# Patient Record
Sex: MALE | Race: WHITE | Employment: FULL TIME | URBAN - METROPOLITAN AREA
[De-identification: names, ages, dates, MRNs, and addresses within clinical notes are randomized per-mention and may not be internally consistent; named-entity substitution may affect disease eponyms.]

---

## 2017-01-29 RX ORDER — FUROSEMIDE 40 MG/1
TABLET ORAL
Qty: 90 TAB | Refills: 1 | Status: SHIPPED | OUTPATIENT
Start: 2017-01-29 | End: 2017-07-29 | Stop reason: SDUPTHER

## 2017-03-23 DIAGNOSIS — E55.9 HYPOVITAMINOSIS D: ICD-10-CM

## 2017-03-23 RX ORDER — ERGOCALCIFEROL 1.25 MG/1
CAPSULE ORAL
Qty: 26 CAP | Refills: 0 | Status: SHIPPED | OUTPATIENT
Start: 2017-03-23 | End: 2018-09-19

## 2017-03-27 DIAGNOSIS — E11.9 DIABETES MELLITUS TYPE 2, CONTROLLED (HCC): ICD-10-CM

## 2017-03-27 NOTE — PROGRESS NOTES
58 y.o. WHITE OR  male who presents for discussion. He seems to be doing ok. Denies polyuria, polydipsia, nocturia, vision change. Not checking sugars at this time. Weight is stable as below    Vitals 3/29/2017 9/27/2016 5/26/2016 5/3/2016 3/29/2016 9/30/2015   Weight 264 lb 272 lb 274 lb 274 lb 281 lb 270 lb     Denies any cv complaints. No set exercise regimen    No gi or gu issues    He thinks he had a bug bite in the left ankle but it cleared up quickly and the swelling went down. Edema otherwise at baseline on his regimen below    Past Medical History:   Diagnosis Date    Anxiety     Arthritis     between C4 and C5    Basal cell carcinoma     s/p resection    Carotid duplex 02/04/2013    No stenosis >49% bilaterally.  Carpal tunnel syndrome     Diabetes ([facility] [address] 05/2016    fbs>125 x2    Dyslipidemia     Fatty liver on Us 1/02     Fib-4 was 0.43 from 9/14    GERD (gastroesophageal reflux disease)     History of myocardial perfusion scan 01/21/2013    Mild fixed basal inferior, mid inferior, inferoapical defect likely artifact, but inrafction in RCA not entirely excluded. No ischemia. EF 53%. No WMA. Neg EKG on max EST. Ex time 8 min 15 sec.  Hypertension     Hypovitaminosis D     Low serum testosterone level 2012    negative w/u Dr. Aure Shore Morbid obesity ([facility] [address]     peak weight 286 lbs, bmi 38.8 from 7/11; intol qsymia, wellbutrin    Normal stress echo 03/31/2015    Normal study after maximal exercise. No symptoms. EF 60%. Ex time 9 min.  Prediabetes     Rhinophyma     S/P cardiac cath 01/22/2002    Patent coronary arteries. LVEDP 12. EF 60%.  Sinus bradycardia     Asymptomatic in 2011    Sleep apnea mild Dr. Aileen Fitzpatrick 2011     AHI 2.3, minimum desats 83% 2011 Dr Aliza Styles VSD (ventricular septal defect) 2011    Noted on echo, with flow noted between the aorta near the aortic valve and the RV outflow tract/proximal main PA.  This can be consistent with a small outlet VSD    Zoster     3 prior episodes in right CN5 distribution? suspect hsv instead     Past Surgical History:   Procedure Laterality Date    HX COLONOSCOPY      Dr. Radames Grider 2007 negative    HX HEART CATHETERIZATION  1/22/02    The right coronary artery is large and dominant. It is widely patent. The left main coronary artery is widely patent. The left anterior descending coronary artery is widely patent. The circumflex coronary artery is widely patent. LVEDP is 12 mmHg. Overall left ventricular systolic function is within normal limits with an EF of 60%. No significant MR or AS is noted.     HX HEENT  1970    surgery for deviated septum    HX ORTHOPAEDIC Right 1976    knee cartilage removed right knee    HX ORTHOPAEDIC Left 2012    CTR    HX ORTHOPAEDIC      Bilateral hip replacement Dr Corinne Pickard Right     trigger release 4th finger hand    HX VASECTOMY      VASCULAR SURGERY PROCEDURE UNLIST  2/13    renal artery dopplers neg    VASECTOMY       Social History     Social History    Marital status:      Spouse name: N/A    Number of children: 1    Years of education: N/A     Occupational History    developer      Social History Main Topics    Smoking status: Former Smoker     Packs/day: 1.00     Years: 10.00     Quit date: 11/26/1987    Smokeless tobacco: Never Used    Alcohol use 1.0 oz/week     2 Standard drinks or equivalent per week      Comment: social weekly    Drug use: No    Sexual activity: Yes     Other Topics Concern    Not on file     Social History Narrative     Current Outpatient Prescriptions   Medication Sig    VITAMIN D2 50,000 unit capsule TAKE 1 CAPSULE BY MOUTH ON TUESDAY AND THURSDAY    furosemide (LASIX) 40 mg tablet TAKE ONE TABLET BY MOUTH DAILY    benazepril (LOTENSIN) 40 mg tablet TAKE ONE TABLET BY MOUTH DAILY    valACYclovir (VALTREX) 1 gram tablet TAKE ONE TABLET BY MOUTH THREE TIMES A DAY    atorvastatin (LIPITOR) 40 mg tablet TAKE ONE TABLET BY MOUTH DAILY    amLODIPine (NORVASC) 10 mg tablet TAKE ONE TABLET BY MOUTH DAILY    metFORMIN ER (GLUCOPHAGE XR) 500 mg tablet Take 1 Tab by mouth daily (with dinner).  metolazone (ZAROXOLYN) 2.5 mg tablet Take 2.5 mg by mouth daily as needed.  PARoxetine (PAXIL) 20 mg tablet TAKE ONE TABLET BY MOUTH ONCE DAILY    Dexlansoprazole (DEXILANT) 60 mg CpDM Take  by mouth daily.  metoprolol (LOPRESSOR) 25 mg tablet Take 1 Tab by mouth two (2) times a day. No current facility-administered medications for this visit. Allergies   Allergen Reactions    Qsymia [Phentermine-Topiramate] Other (comments)     Dulled his thinking    Wellbutrin [Bupropion Hcl] Other (comments)     Patient said it made him feel funny     REVIEW OF SYSTEMS: colo 2007 Dr Eber Emerson  Ophtho  no vision change or eye pain  Oral  no mouth pain, tongue or tooth problems  Ears  no hearing loss, ear pain, fullness  Throat  no swallowing problems  Chest  no breast masses  Resp  no wheezing, chronic coughing, dyspnea  GI  no heartburn, nausea, vomiting, change in bowel habits, bleeding, hemorrhoids  Urinary  no dysuria, hematuria, flank pain, urgency, frequency  Constitutional  no wt loss, night sweats, unexplained fevers  Neuro  no focal weakness, numbness, paresthesias, incoordination, ataxia, involuntary movements    Visit Vitals    /84    Pulse 72    Temp 99.5 °F (37.5 °C) (Oral)    Ht 6' (1.829 m)    Wt 264 lb (119.7 kg)    SpO2 97%    BMI 35.8 kg/m2     A&O x3  Affect is appropriate. Mood stable  No apparent distress  Anicteric, no JVD, adenopathy or thyromegaly. No carotid bruits or radiated murmur  Lungs clear to auscultation, no wheezes or rales  Heart showed regular rate and rhythm. No murmur, rubs, gallops  Abdomen soft nontender, no hepatosplenomegaly or masses. Extremities with trace ankle edema.   Pulses 1-2+ symmetrically  Foot exam bilaterally showed  Vibration, proprioception, filament test intact  Pulses 1-2+ DP and PT    LABS  From 1/12 showed gluc 123, cr 0.80, gfr>60, alt 31, ldl-p 1701,                                   vit d 13.5  From 4/12 showed gluc 120, cr 0.70, gfr>60, alt 34, ldl-p 1628,                                   test 268,        vit d 24.1,        psa 0.83  From 8/12 showed gluc 114, cr 0.80, gfr>60,  alt 37, ldl-p 1203, chol 148, tg 112, hd 51, ldl-c 86,  hba1c 5.4, test 439  From1/13  showed gluc 135, cr 0.90,              alt 25, ldl-p 951,   chol 110, tg 75,    hdl 36, ldl-c 59,                   test 379  From 6/14 showed gluc 85,   cr 0.80, gfr>60,  alt 19,         chol 142, tg 132, hdl 44, ldl-c 72, hba1c 5.2, wbc 7.8,  hb 14.7, plt 288, ua neg,  umar 4.9, psa 1.00  From 9/14 showed gluc 96,   cr 0.89, gfr>60,  alt 27,                  hba1c 5.9, wbc 8.2,   hb 14.6, plt 289, ua neg,          inr 0.9, ptt 34.0  From 5/15 showed gluc 104, cr 0.70, gfr 103, alt 12,         chol 162, tg 83,   hdl 53, ldl-c 93,        wbc 19.2, hb 15.3, plt 356, tsh 0.75, vit d 48.8  From 6/15 showed                   wbc 4.4,   hb 14.5, plt 223  From 9/15 showed gluc 111, cr 0.80, gfr>60,  alt 18,         chol 120, tg 134, hdl 34, ldl-c 61, hba1c 5.7, wbc 6.7,   hb 14.9, plt 256  From 5/16 showed gluc 128, cr 0.80, gfr>60,  alt 21,         chol 124, tg 128, hdl 35, ldl-c 63, hba1c 6.4, wbc 6.6,   hb 13.8, plt 251  From 9/16 showed gluc 116, cr 0.90, gfr>60,          chol 113, tg 86,   hdl 36, ldl-c 60, hba1c 6.0, wbc 7.0,   hb 14.5, plt 258, psa 0.65    Patient Active Problem List   Diagnosis Code    Chronic LE edema R60.0    Hypovitaminosis D Dr. Amrita Johnson E55.9    Dyslipidemia E78.5    Sleep apnea Dr. Aileen Fitzpatrick 2011 AHI 2.8 G47.30    Osteoarthritis, hip, bilateral M16.0    Gastroesophageal reflux disease without esophagitis Dr Cody Burgess 2002 K21.9    Obesity (BMI 30-39. 9) E66.9    Controlled type 2 diabetes mellitus E11.9    Primary hypertension I10     Assessment and plan:  1. Hypertension. Continue current  2. Dyslipidemia. At target  3. Chronic edema. Continue diuretics  4. VSD vs AI. F/U Dr. Magi Resendiz  5. GERD. Continue current and f/u Dr. Brenda Bush  6. Hypovitaminosis D. Continue supplementation, check next draw  7. Morbid obesity. Declined med supervised wt loss, bariatrics  8. DM. Check labs at his conveninece, f/u [facility]  9. ED. Prn viagra  10.  zostavax given. PVX in future  11. Will sched colo Dr Brenda Bush      RTC 1/17    Above conditions discussed at length and patient vocalized understanding.   All questions answered to patient satifaction

## 2017-03-28 DIAGNOSIS — E11.9 DIABETES MELLITUS TYPE 2, CONTROLLED (HCC): ICD-10-CM

## 2017-03-29 ENCOUNTER — OFFICE VISIT (OUTPATIENT)
Dept: INTERNAL MEDICINE CLINIC | Age: 62
End: 2017-03-29

## 2017-03-29 VITALS
DIASTOLIC BLOOD PRESSURE: 84 MMHG | WEIGHT: 264 LBS | OXYGEN SATURATION: 97 % | TEMPERATURE: 99.5 F | BODY MASS INDEX: 35.76 KG/M2 | HEART RATE: 72 BPM | SYSTOLIC BLOOD PRESSURE: 124 MMHG | HEIGHT: 72 IN

## 2017-03-29 DIAGNOSIS — E55.9 HYPOVITAMINOSIS D: ICD-10-CM

## 2017-03-29 DIAGNOSIS — R60.0 EDEMA EXTREMITIES: ICD-10-CM

## 2017-03-29 DIAGNOSIS — E11.9 CONTROLLED TYPE 2 DIABETES MELLITUS WITHOUT COMPLICATION, WITHOUT LONG-TERM CURRENT USE OF INSULIN (HCC): ICD-10-CM

## 2017-03-29 DIAGNOSIS — E66.9 OBESITY (BMI 30-39.9): ICD-10-CM

## 2017-03-29 DIAGNOSIS — K21.9 GASTROESOPHAGEAL REFLUX DISEASE WITHOUT ESOPHAGITIS: ICD-10-CM

## 2017-03-29 DIAGNOSIS — E11.9 DIABETES MELLITUS TYPE 2, CONTROLLED (HCC): ICD-10-CM

## 2017-03-29 DIAGNOSIS — G47.33 OBSTRUCTIVE SLEEP APNEA SYNDROME: ICD-10-CM

## 2017-03-29 DIAGNOSIS — Z12.11 SCREENING FOR COLON CANCER: ICD-10-CM

## 2017-03-29 DIAGNOSIS — E78.5 DYSLIPIDEMIA: ICD-10-CM

## 2017-03-29 DIAGNOSIS — Z23 ENCOUNTER FOR IMMUNIZATION: Primary | ICD-10-CM

## 2017-03-29 DIAGNOSIS — I10 PRIMARY HYPERTENSION: ICD-10-CM

## 2017-03-29 NOTE — MR AVS SNAPSHOT
Visit Information Date & Time Provider Department Dept. Phone Encounter #  
 3/29/2017  9:15 AM Thais Driver MD [facility] [phone] [id number] Your Appointments 5/2/2017  3:00 PM  
Follow Up with Lexy Almaguer MD  
[facility] ([address]) Appt Note: 1 year follow up [place] [id number]  
[phone] [address] [place] [address] [address] Upcoming Health Maintenance Date Due Hepatitis C Screening 1955 FOOT EXAM Q1 2/13/1965 EYE EXAM RETINAL OR DILATED Q1 2/13/1965 Pneumococcal 19-64 Medium Risk (1 of 1 - PPSV23) 2/13/1974 ZOSTER VACCINE AGE 60> 2/13/2015 MICROALBUMIN Q1 6/17/2015 HEMOGLOBIN A1C Q6M 3/19/2017 LIPID PANEL Q1 9/19/2017 COLONOSCOPY 9/27/2018 DTaP/Tdap/Td series (3 - Td) 5/20/2025 Allergies as of 3/29/2017  Review Complete On: 9/27/2016 By: Thais Driver MD  
  
 Severity Noted Reaction Type Reactions Qsymia [Phentermine-topiramate]  09/30/2015    Other (comments) Dulled his thinking Wellbutrin [Bupropion Hcl]   Intolerance Other (comments) Patient said it made him feel funny Current Immunizations  Reviewed on 9/22/2016 Name Date Influenza Vaccine 10/1/2012 Influenza Vaccine (Quad) PF 9/27/2016 Influenza Vaccine Whole 1/27/2011 Pneumococcal Conjugate (PCV-13) 5/20/2015 TD Vaccine 1/1/1995 Tdap 5/20/2015, 1/30/2013 Not reviewed this visit Vitals BP Pulse Temp Height(growth percentile) Weight(growth percentile) SpO2  
 124/84 72 99.5 °F (37.5 °C) (Oral) 6' (1.829 m) 264 lb (119.7 kg) 97% BMI Smoking Status 35.8 kg/m2 Former Smoker Vitals History BMI and BSA Data Body Mass Index Body Surface Area  
 35.8 kg/m 2 2.47 m 2 Preferred Pharmacy Pharm[facility] Name Phone [facility] [facility] [phone] Your Updated Medication List  
  
   
This list is accurate as of: 3/29/17 10:11 AM.  Always use your most recent med list. amLODIPine 10 mg tablet Commonly known as:  Lexy Pace TAKE ONE TABLET BY MOUTH DAILY  
  
 atorvastatin 40 mg tablet Commonly known as:  LIPITOR  
TAKE ONE TABLET BY MOUTH DAILY  
  
 benazepril 40 mg tablet Commonly known as:  LOTENSIN  
TAKE ONE TABLET BY MOUTH DAILY DEXILANT 60 mg Cpdb Generic drug:  Dexlansoprazole Take  by mouth daily. furosemide 40 mg tablet Commonly known as:  LASIX TAKE ONE TABLET BY MOUTH DAILY  
  
 metFORMIN  mg tablet Commonly known as:  GLUCOPHAGE XR Take 1 Tab by mouth daily (with dinner). metOLazone 2.5 mg tablet Commonly known as:  Mackey Valle Take 2.5 mg by mouth daily as needed. metoprolol tartrate 25 mg tablet Commonly known as:  LOPRESSOR Take 1 Tab by mouth two (2) times a day. PARoxetine 20 mg tablet Commonly known as:  PAXIL TAKE ONE TABLET BY MOUTH ONCE DAILY  
  
 valACYclovir 1 gram tablet Commonly known as:  VALTREX  
TAKE ONE TABLET BY MOUTH THREE TIMES A DAY  
  
 VITAMIN D2 50,000 unit capsule Generic drug:  ergocalciferol TAKE 1 CAPSULE BY MOUTH ON TUESDAY AND THURSDAY Introducing hospitals & HEALTH SERVICES! Dear Vj Roth: 
Thank you for requesting a "[employer] account. Our records indicate that you already have an active "[employer] account. You can access your account anytime at [email or web address] Did you know that you can access your hospital and ER discharge instructions at any time in "[employer]? You can also review all of your test results from your hospital stay or ER visit. Additional Information If you have questions, please visit the Frequently Asked Questions section of the [employer] website at [email or web address] Remember, [employer] is NOT to be used for urgent needs. For medical emergencies, dial 911. Now available from your iPhone and Android! Please provide this summary of care documentation to your next provider. Your primary care clinician is listed as Sreekanth Erickson. If you have any questions after today's visit, please call [phone].

## 2017-03-29 NOTE — PROGRESS NOTES
1. Have you been to the ER, urgent care clinic or hospitalized since your last visit? NO.     2. Have you seen or consulted any other health care providers outside of the [address] [place] [address] since your last visit (Include any pap smears or colon screening)? NO      Do you have an Advanced Directive? YES    Would you like information on Advanced Directives?  NO

## 2017-04-19 ENCOUNTER — HOSPITAL ENCOUNTER (OUTPATIENT)
Dept: LAB | Age: 62
Discharge: HOME | End: 2017-04-19

## 2017-04-19 LAB — SENTARA SPECIMEN COL,SENBCF: NORMAL

## 2017-04-19 PROCEDURE — 99001 SPECIMEN HANDLING PT-LAB: CPT | Performed by: INTERNAL MEDICINE

## 2017-05-03 DIAGNOSIS — E78.5 DYSLIPIDEMIA: ICD-10-CM

## 2017-05-03 RX ORDER — ATORVASTATIN CALCIUM 40 MG/1
TABLET, FILM COATED ORAL
Qty: 30 TAB | Refills: 0 | Status: SHIPPED | OUTPATIENT
Start: 2017-05-03 | End: 2017-06-16 | Stop reason: SDUPTHER

## 2017-05-08 RX ORDER — ERGOCALCIFEROL 1.25 MG/1
CAPSULE ORAL
Qty: 26 CAP | Refills: 0 | Status: SHIPPED | OUTPATIENT
Start: 2017-05-08 | End: 2017-08-15 | Stop reason: SDUPTHER

## 2017-05-22 ENCOUNTER — TELEPHONE (OUTPATIENT)
Dept: INTERNAL MEDICINE CLINIC | Age: 62
End: 2017-05-22

## 2017-05-22 NOTE — TELEPHONE ENCOUNTER
Has he tried protonix (pantoprazole), nexium (esomeprazole), prevacid (lansoprazole)  Any of the 3 will be cheaper  Alternatively, we can try [address] also

## 2017-05-23 NOTE — TELEPHONE ENCOUNTER
I sent a prior auth to [employer] to see if that's what it needs, I didn't get a prior auth request from the pharmacy

## 2017-05-25 RX ORDER — PAROXETINE HYDROCHLORIDE 20 MG/1
TABLET, FILM COATED ORAL
Qty: 30 TAB | Refills: 10 | Status: SHIPPED | OUTPATIENT
Start: 2017-05-25 | End: 2018-06-03 | Stop reason: SDUPTHER

## 2017-05-29 DIAGNOSIS — E11.9 DIABETES MELLITUS TYPE 2, CONTROLLED (HCC): ICD-10-CM

## 2017-05-30 ENCOUNTER — TELEPHONE (OUTPATIENT)
Dept: INTERNAL MEDICINE CLINIC | Age: 62
End: 2017-05-30

## 2017-05-30 RX ORDER — METFORMIN HYDROCHLORIDE 500 MG/1
TABLET, EXTENDED RELEASE ORAL
Qty: 30 TAB | Refills: 10 | Status: SHIPPED | OUTPATIENT
Start: 2017-05-30 | End: 2018-06-03 | Stop reason: SDUPTHER

## 2017-05-30 NOTE — TELEPHONE ENCOUNTER
John Urena -PT REP-   is calling - they are looking for his lab results from April- please call- they are not resulted in My Chart

## 2017-05-30 NOTE — TELEPHONE ENCOUNTER
hba1c 5.8  Chol 132  tg 139,   hdl 32 - low -inc exercise  ldl 72  Vit d 43  umar negative    Labs ok except for hdl

## 2017-07-10 RX ORDER — AMLODIPINE BESYLATE 10 MG/1
TABLET ORAL
Qty: 90 TAB | Refills: 1 | Status: SHIPPED | OUTPATIENT
Start: 2017-07-10 | End: 2018-03-15 | Stop reason: SDUPTHER

## 2017-07-30 RX ORDER — FUROSEMIDE 40 MG/1
TABLET ORAL
Qty: 90 TAB | Refills: 0 | Status: SHIPPED | OUTPATIENT
Start: 2017-07-30 | End: 2017-10-26 | Stop reason: SDUPTHER

## 2017-08-15 RX ORDER — ERGOCALCIFEROL 1.25 MG/1
CAPSULE ORAL
Qty: 26 CAP | Refills: 0 | Status: SHIPPED | OUTPATIENT
Start: 2017-08-15 | End: 2018-01-16 | Stop reason: SDUPTHER

## 2017-08-22 PROBLEM — K57.30 DIVERTICULOSIS OF LARGE INTESTINE WITHOUT HEMORRHAGE: Status: ACTIVE | Noted: 2017-08-22

## 2017-09-26 ENCOUNTER — TELEPHONE (OUTPATIENT)
Dept: INTERNAL MEDICINE CLINIC | Age: 62
End: 2017-09-26

## 2017-10-26 RX ORDER — FUROSEMIDE 40 MG/1
TABLET ORAL
Qty: 90 TAB | Refills: 0 | Status: SHIPPED | OUTPATIENT
Start: 2017-10-26 | End: 2018-02-07 | Stop reason: SDUPTHER

## 2017-11-11 DIAGNOSIS — I10 PRIMARY HYPERTENSION: ICD-10-CM

## 2017-11-13 RX ORDER — BENAZEPRIL HYDROCHLORIDE 40 MG/1
TABLET ORAL
Qty: 30 TAB | Refills: 8 | Status: SHIPPED | OUTPATIENT
Start: 2017-11-13 | End: 2018-11-16 | Stop reason: SDUPTHER

## 2018-01-16 ENCOUNTER — OFFICE VISIT (OUTPATIENT)
Dept: CARDIOLOGY CLINIC | Age: 63
End: 2018-01-16

## 2018-01-16 VITALS
OXYGEN SATURATION: 98 % | BODY MASS INDEX: 37.79 KG/M2 | WEIGHT: 279 LBS | HEIGHT: 72 IN | SYSTOLIC BLOOD PRESSURE: 152 MMHG | DIASTOLIC BLOOD PRESSURE: 86 MMHG | HEART RATE: 72 BPM

## 2018-01-16 DIAGNOSIS — I10 PRIMARY HYPERTENSION: ICD-10-CM

## 2018-01-16 DIAGNOSIS — E78.5 DYSLIPIDEMIA: Primary | ICD-10-CM

## 2018-01-16 NOTE — PROGRESS NOTES
HISTORY OF PRESENT ILLNESS  Michelle De León is a 58 y.o. male. ASSESSMENT and PLAN    Mr. Jadyn Fried has history of possible VSD noted on an echocardiogram back in 2011. He has had 2 subsequent echocardiographic evaluations since then. Both of the follow-up echocardiogram did not show any obvious VSD; the eccentric jet is more likely consistent with eccentric aortic insufficiency and/or pulmonic insufficiency. There is no evidence of significant pulmonary hypertension or RV dysfunction. He has no significant CAD. He did undergo coronary angiography back in 2002 which did not show any significant epicardial occlusive disease. In March of 2015, he had an episode of malaise, increased dyspnea, chest pain, and palpitations. He was sitting at his desk when all these symptoms occurred. The stress echocardiography did not reveal any significant abnormalities.  CAD:   He has no documented history of CAD. He did have coronary angiography back in 2002 which was unremarkable.  VSD: He has no objective evidence of VSD as noted above.  BP:   Upper normal to mildly elevated. This is likely from his weight gain. I will defer further management of medications to you.  HR:    Stable.  CHF:   There is no evidence of decompensated CHF noted.  Weight:   His weight today is 279 pounds. His weight in May 2016 was 274 pounds. I discussed with him at length about the importance of weight control.  Cholesterol:   Target LDL <90. He remains on Lipitor 40 mg daily. I will see him back annually. Thank you. Encounter Diagnoses   Name Primary?     Dyslipidemia Yes    Primary hypertension      current treatment plan is effective, no change in therapy  lab results and schedule of future lab studies reviewed with patient  reviewed diet, exercise and weight control  cardiovascular risk and specific lipid/LDL goals reviewed      HPI  Today, Mr. Jerrell Velasquez has no complaints of chest pains, increased shortness of breath or decreased exercise capacity. He denies any orthopnea or PND. He denies any palpitations or dizziness. Because of the cold weather, and the recent holidays, he states that he put on some weight. Review of Systems   Respiratory: Negative for shortness of breath. Cardiovascular: Negative for chest pain, palpitations, orthopnea, claudication, leg swelling and PND. Musculoskeletal: Positive for joint pain. All other systems reviewed and are negative. Physical Exam   Constitutional: He is oriented to person, place, and time. He appears well-developed and well-nourished. HENT:   Head: Normocephalic. Eyes: Conjunctivae are normal.   Neck: Neck supple. No JVD present. Carotid bruit is not present. No thyromegaly present. Cardiovascular: Normal rate and regular rhythm. Pulmonary/Chest: Breath sounds normal.   Abdominal: Bowel sounds are normal.   Musculoskeletal: He exhibits no edema. Neurological: He is alert and oriented to person, place, and time. Skin: Skin is warm and dry. Nursing note and vitals reviewed. PCP: Flynn Jimenez MD    Past Medical History:   Diagnosis Date    Anxiety     Arthritis     between C4 and C5    Basal cell carcinoma     s/p resection    Carotid duplex 02/04/2013    No stenosis >49% bilaterally.  Carpal tunnel syndrome     Diabetes ([facility] [address] 05/2016    fbs>125 x2    Dyslipidemia     Fatty liver on Us 1/02     Fib-4 was 0.43 from 9/14    GERD (gastroesophageal reflux disease)     History of myocardial perfusion scan 01/21/2013    Mild fixed basal inferior, mid inferior, inferoapical defect likely artifact, but inrafction in RCA not entirely excluded. No ischemia. EF 53%. No WMA. Neg EKG on max EST. Ex time 8 min 15 sec.       Hypertension     Hypovitaminosis D     Low serum testosterone level 2012    negative w/u Dr. Alirio Lemons Morbid obesity ([facility] [address]     peak weight 286 lbs, bmi 38.8 from 7/11; intol qsymia, wellbutrin    Normal stress echo 03/31/2015    Normal study after maximal exercise. No symptoms. EF 60%. Ex time 9 min.  Prediabetes     Rhinophyma     S/P cardiac cath 01/22/2002    Patent coronary arteries. LVEDP 12. EF 60%.  Sinus bradycardia     Asymptomatic in 2011    Sleep apnea mild Dr. Sara De Santiago 2011     AHI 2.3, minimum desats 83% 2011 Dr Rula Jansen VSD (ventricular septal defect) 2011    Noted on echo, with flow noted between the aorta near the aortic valve and the RV outflow tract/proximal main PA. This can be consistent with a small outlet VSD    Zoster     3 prior episodes in right CN5 distribution? suspect hsv instead       Past Surgical History:   Procedure Laterality Date    HX COLONOSCOPY      Dr. Gianfranco Connor 2007 negative; 8/22/17 divertics    HX HEART CATHETERIZATION  1/22/02    The right coronary artery is large and dominant. It is widely patent. The left main coronary artery is widely patent. The left anterior descending coronary artery is widely patent. The circumflex coronary artery is widely patent. LVEDP is 12 mmHg. Overall left ventricular systolic function is within normal limits with an EF of 60%. No significant MR or AS is noted.    [address]    surgery for deviated septum    HX ORTHOPAEDIC Right 1976    knee cartilage removed right knee    HX ORTHOPAEDIC Left 2012    CTR    HX ORTHOPAEDIC      Bilateral hip replacement Dr Katelynn Germain Right     trigger release 4th finger hand    HX VASECTOMY      VASCULAR SURGERY PROCEDURE UNLIST  2/13    renal artery dopplers neg    VASECTOMY         Current Outpatient Prescriptions   Medication Sig Dispense Refill    benazepril (LOTENSIN) 40 mg tablet TAKE ONE TABLET BY MOUTH DAILY 30 Tab 8    furosemide (LASIX) 40 mg tablet TAKE ONE TABLET BY MOUTH DAILY 90 Tab 0    amLODIPine (NORVASC) 10 mg tablet TAKE ONE TABLET BY MOUTH DAILY 90 Tab 1    atorvastatin (LIPITOR) 40 mg tablet TAKE ONE TABLET BY MOUTH DAILY 90 Tab 3    metFORMIN ER (GLUCOPHAGE XR) 500 mg tablet TAKE ONE TABLET BY MOUTH DAILY WITH DINNER 30 Tab 10    PARoxetine (PAXIL) 20 mg tablet TAKE ONE TABLET BY MOUTH DAILY 30 Tab 10    VITAMIN D2 50,000 unit capsule TAKE 1 CAPSULE BY MOUTH ON TUESDAY AND THURSDAY 26 Cap 0    valACYclovir (VALTREX) 1 gram tablet TAKE ONE TABLET BY MOUTH THREE TIMES A DAY 21 Tab 0    metolazone (ZAROXOLYN) 2.5 mg tablet Take 2.5 mg by mouth daily as needed.  metoprolol (LOPRESSOR) 25 mg tablet Take 1 Tab by mouth two (2) times a day. 60 Tab 1    Dexlansoprazole (DEXILANT) 60 mg CpDM Take  by mouth daily. The patient has a family history of    Social History   Substance Use Topics    Smoking status: Former Smoker     Packs/day: 1.00     Years: 10.00     Quit date: 11/26/1987    Smokeless tobacco: Never Used    Alcohol use 1.0 oz/week     2 Standard drinks or equivalent per week      Comment: social weekly       Lab Results   Component Value Date/Time    Cholesterol, total 113 09/19/2016 10:35 PM    HDL Cholesterol 36 09/19/2016 10:35 PM    LDL, calculated 60 09/19/2016 10:35 PM    Triglyceride 86 09/19/2016 10:35 PM    CHOL/HDL Ratio 5.3 12/29/2009 07:49 AM        BP Readings from Last 3 Encounters:   01/16/18 152/86   03/29/17 124/84   09/27/16 140/84        Pulse Readings from Last 3 Encounters:   01/16/18 72   03/29/17 72   09/27/16 70       Wt Readings from Last 3 Encounters:   01/16/18 126.6 kg (279 lb)   03/29/17 119.7 kg (264 lb)   09/27/16 123.4 kg (272 lb)         EKG: normal EKG, normal sinus rhythm, unchanged from previous tracings.

## 2018-01-16 NOTE — PROGRESS NOTES
1. Have you been to the ER, urgent care clinic since your last visit? Hospitalized since your last visit?no      2. Have you seen or consulted any other health care providers outside of the [facility] since your last visit? Include any pap smears or colon screening.  no

## 2018-02-07 RX ORDER — FUROSEMIDE 40 MG/1
TABLET ORAL
Qty: 90 TAB | Refills: 0 | Status: SHIPPED | OUTPATIENT
Start: 2018-02-07 | End: 2018-07-23 | Stop reason: SDUPTHER

## 2018-02-25 ENCOUNTER — APPOINTMENT (OUTPATIENT)
Dept: MRI IMAGING | Age: 63
DRG: 074 | End: 2018-02-25
Attending: EMERGENCY MEDICINE
Payer: COMMERCIAL

## 2018-02-25 ENCOUNTER — APPOINTMENT (OUTPATIENT)
Dept: CT IMAGING | Age: 63
DRG: 074 | End: 2018-02-25
Attending: EMERGENCY MEDICINE
Payer: COMMERCIAL

## 2018-02-25 ENCOUNTER — HOSPITAL ENCOUNTER (INPATIENT)
Age: 63
LOS: 2 days | Discharge: HOME | DRG: 074 | End: 2018-02-27
Attending: EMERGENCY MEDICINE | Admitting: HOSPITALIST
Payer: COMMERCIAL

## 2018-02-25 DIAGNOSIS — S19.9XXA INJURY OF NECK, INITIAL ENCOUNTER: Primary | ICD-10-CM

## 2018-02-25 PROBLEM — S13.4XXA SPRAIN OF LIGAMENTS OF CERVICAL SPINE: Status: ACTIVE | Noted: 2018-02-25

## 2018-02-25 PROBLEM — R55 SYNCOPE: Status: ACTIVE | Noted: 2018-02-25

## 2018-02-25 LAB
ALBUMIN SERPL-MCNC: 3.8 G/DL (ref 3.4–5)
ALBUMIN/GLOB SERPL: 1 {RATIO} (ref 0.8–1.7)
ALP SERPL-CCNC: 92 U/L (ref 45–117)
ALT SERPL-CCNC: 49 U/L (ref 16–61)
ANION GAP SERPL CALC-SCNC: 12 MMOL/L (ref 3–18)
AST SERPL-CCNC: 31 U/L (ref 15–37)
ATRIAL RATE: 57 BPM
BASOPHILS # BLD: 0 K/UL (ref 0–0.1)
BASOPHILS NFR BLD: 0 % (ref 0–2)
BILIRUB SERPL-MCNC: 0.8 MG/DL (ref 0.2–1)
BUN SERPL-MCNC: 15 MG/DL (ref 7–18)
BUN/CREAT SERPL: 16 (ref 12–20)
CALCIUM SERPL-MCNC: 8.8 MG/DL (ref 8.5–10.1)
CALCULATED P AXIS, ECG09: 65 DEGREES
CALCULATED R AXIS, ECG10: 46 DEGREES
CALCULATED T AXIS, ECG11: 54 DEGREES
CHLORIDE SERPL-SCNC: 103 MMOL/L (ref 100–108)
CK MB CFR SERPL CALC: 1 % (ref 0–4)
CK MB CFR SERPL CALC: 1.2 % (ref 0–4)
CK MB CFR SERPL CALC: 1.2 % (ref 0–4)
CK MB SERPL-MCNC: 3.9 NG/ML (ref 5–25)
CK MB SERPL-MCNC: 5 NG/ML (ref 5–25)
CK MB SERPL-MCNC: 6.6 NG/ML (ref 5–25)
CK SERPL-CCNC: 313 U/L (ref 39–308)
CK SERPL-CCNC: 487 U/L (ref 39–308)
CK SERPL-CCNC: 569 U/L (ref 39–308)
CO2 SERPL-SCNC: 22 MMOL/L (ref 21–32)
CREAT SERPL-MCNC: 0.95 MG/DL (ref 0.6–1.3)
DIAGNOSIS, 93000: NORMAL
DIFFERENTIAL METHOD BLD: ABNORMAL
EOSINOPHIL # BLD: 0.1 K/UL (ref 0–0.4)
EOSINOPHIL NFR BLD: 1 % (ref 0–5)
ERYTHROCYTE [DISTWIDTH] IN BLOOD BY AUTOMATED COUNT: 11.9 % (ref 11.6–14.5)
EST. AVERAGE GLUCOSE BLD GHB EST-MCNC: 126 MG/DL
GLOBULIN SER CALC-MCNC: 3.7 G/DL (ref 2–4)
GLUCOSE BLD STRIP.AUTO-MCNC: 126 MG/DL (ref 70–110)
GLUCOSE BLD STRIP.AUTO-MCNC: 241 MG/DL (ref 70–110)
GLUCOSE SERPL-MCNC: 180 MG/DL (ref 74–99)
HBA1C MFR BLD: 6 % (ref 4.2–5.6)
HCT VFR BLD AUTO: 40.7 % (ref 36–48)
HGB BLD-MCNC: 14.6 G/DL (ref 13–16)
LYMPHOCYTES # BLD: 2.4 K/UL (ref 0.9–3.6)
LYMPHOCYTES NFR BLD: 23 % (ref 21–52)
MCH RBC QN AUTO: 32.6 PG (ref 24–34)
MCHC RBC AUTO-ENTMCNC: 35.9 G/DL (ref 31–37)
MCV RBC AUTO: 90.8 FL (ref 74–97)
MONOCYTES # BLD: 0.7 K/UL (ref 0.05–1.2)
MONOCYTES NFR BLD: 7 % (ref 3–10)
NEUTS SEG # BLD: 7.1 K/UL (ref 1.8–8)
NEUTS SEG NFR BLD: 69 % (ref 40–73)
P-R INTERVAL, ECG05: 172 MS
PLATELET # BLD AUTO: 256 K/UL (ref 135–420)
PMV BLD AUTO: 10.8 FL (ref 9.2–11.8)
POTASSIUM SERPL-SCNC: 3.3 MMOL/L (ref 3.5–5.5)
PROT SERPL-MCNC: 7.5 G/DL (ref 6.4–8.2)
Q-T INTERVAL, ECG07: 488 MS
QRS DURATION, ECG06: 104 MS
QTC CALCULATION (BEZET), ECG08: 474 MS
RBC # BLD AUTO: 4.48 M/UL (ref 4.7–5.5)
SODIUM SERPL-SCNC: 137 MMOL/L (ref 136–145)
TROPONIN I SERPL-MCNC: <0.02 NG/ML (ref 0–0.04)
VENTRICULAR RATE, ECG03: 57 BPM
WBC # BLD AUTO: 10.4 K/UL (ref 4.6–13.2)

## 2018-02-25 PROCEDURE — 96374 THER/PROPH/DIAG INJ IV PUSH: CPT

## 2018-02-25 PROCEDURE — 74011250637 HC RX REV CODE- 250/637: Performed by: HOSPITALIST

## 2018-02-25 PROCEDURE — 80053 COMPREHEN METABOLIC PANEL: CPT | Performed by: EMERGENCY MEDICINE

## 2018-02-25 PROCEDURE — 82962 GLUCOSE BLOOD TEST: CPT

## 2018-02-25 PROCEDURE — 65660000000 HC RM CCU STEPDOWN

## 2018-02-25 PROCEDURE — 74011250636 HC RX REV CODE- 250/636: Performed by: EMERGENCY MEDICINE

## 2018-02-25 PROCEDURE — 72125 CT NECK SPINE W/O DYE: CPT

## 2018-02-25 PROCEDURE — A9575 INJ GADOTERATE MEGLUMI 0.1ML: HCPCS | Performed by: EMERGENCY MEDICINE

## 2018-02-25 PROCEDURE — 99285 EMERGENCY DEPT VISIT HI MDM: CPT

## 2018-02-25 PROCEDURE — 74011636320 HC RX REV CODE- 636/320: Performed by: EMERGENCY MEDICINE

## 2018-02-25 PROCEDURE — 74011250637 HC RX REV CODE- 250/637: Performed by: EMERGENCY MEDICINE

## 2018-02-25 PROCEDURE — 36415 COLL VENOUS BLD VENIPUNCTURE: CPT | Performed by: HOSPITALIST

## 2018-02-25 PROCEDURE — 93005 ELECTROCARDIOGRAM TRACING: CPT

## 2018-02-25 PROCEDURE — 96375 TX/PRO/DX INJ NEW DRUG ADDON: CPT

## 2018-02-25 PROCEDURE — 74011250636 HC RX REV CODE- 250/636: Performed by: HOSPITALIST

## 2018-02-25 PROCEDURE — 72156 MRI NECK SPINE W/O & W/DYE: CPT

## 2018-02-25 PROCEDURE — 70450 CT HEAD/BRAIN W/O DYE: CPT

## 2018-02-25 PROCEDURE — 74011636637 HC RX REV CODE- 636/637: Performed by: HOSPITALIST

## 2018-02-25 PROCEDURE — 82550 ASSAY OF CK (CPK): CPT | Performed by: EMERGENCY MEDICINE

## 2018-02-25 PROCEDURE — 83036 HEMOGLOBIN GLYCOSYLATED A1C: CPT | Performed by: HOSPITALIST

## 2018-02-25 PROCEDURE — 74011250636 HC RX REV CODE- 250/636

## 2018-02-25 PROCEDURE — 85025 COMPLETE CBC W/AUTO DIFF WBC: CPT | Performed by: EMERGENCY MEDICINE

## 2018-02-25 PROCEDURE — 74011000258 HC RX REV CODE- 258: Performed by: EMERGENCY MEDICINE

## 2018-02-25 RX ORDER — ATORVASTATIN CALCIUM 40 MG/1
40 TABLET, FILM COATED ORAL
Status: DISCONTINUED | OUTPATIENT
Start: 2018-02-25 | End: 2018-02-27 | Stop reason: HOSPADM

## 2018-02-25 RX ORDER — AMLODIPINE BESYLATE 5 MG/1
5 TABLET ORAL DAILY
Status: DISCONTINUED | OUTPATIENT
Start: 2018-02-26 | End: 2018-02-27 | Stop reason: HOSPADM

## 2018-02-25 RX ORDER — MORPHINE SULFATE 4 MG/ML
7 INJECTION, SOLUTION INTRAMUSCULAR; INTRAVENOUS
Status: COMPLETED | OUTPATIENT
Start: 2018-02-25 | End: 2018-02-25

## 2018-02-25 RX ORDER — INSULIN LISPRO 100 [IU]/ML
INJECTION, SOLUTION INTRAVENOUS; SUBCUTANEOUS
Status: DISCONTINUED | OUTPATIENT
Start: 2018-02-25 | End: 2018-02-27 | Stop reason: HOSPADM

## 2018-02-25 RX ORDER — ADHESIVE BANDAGE
30 BANDAGE TOPICAL DAILY PRN
Status: DISCONTINUED | OUTPATIENT
Start: 2018-02-25 | End: 2018-02-27 | Stop reason: HOSPADM

## 2018-02-25 RX ORDER — OXYCODONE AND ACETAMINOPHEN 5; 325 MG/1; MG/1
1 TABLET ORAL
Status: COMPLETED | OUTPATIENT
Start: 2018-02-25 | End: 2018-02-25

## 2018-02-25 RX ORDER — POTASSIUM CHLORIDE 20 MEQ/1
40 TABLET, EXTENDED RELEASE ORAL
Status: COMPLETED | OUTPATIENT
Start: 2018-02-25 | End: 2018-02-25

## 2018-02-25 RX ORDER — DEXAMETHASONE SODIUM PHOSPHATE 4 MG/ML
4 INJECTION, SOLUTION INTRA-ARTICULAR; INTRALESIONAL; INTRAMUSCULAR; INTRAVENOUS; SOFT TISSUE EVERY 6 HOURS
Status: DISCONTINUED | OUTPATIENT
Start: 2018-02-25 | End: 2018-02-26

## 2018-02-25 RX ORDER — OXYCODONE AND ACETAMINOPHEN 5; 325 MG/1; MG/1
1-2 TABLET ORAL
Status: DISCONTINUED | OUTPATIENT
Start: 2018-02-25 | End: 2018-02-27 | Stop reason: HOSPADM

## 2018-02-25 RX ORDER — PAROXETINE HYDROCHLORIDE 20 MG/1
20 TABLET, FILM COATED ORAL DAILY
Status: DISCONTINUED | OUTPATIENT
Start: 2018-02-26 | End: 2018-02-27 | Stop reason: HOSPADM

## 2018-02-25 RX ORDER — HYDROMORPHONE HYDROCHLORIDE 1 MG/ML
1 INJECTION, SOLUTION INTRAMUSCULAR; INTRAVENOUS; SUBCUTANEOUS ONCE
Status: DISCONTINUED | OUTPATIENT
Start: 2018-02-25 | End: 2018-02-25

## 2018-02-25 RX ORDER — GABAPENTIN 100 MG/1
300 CAPSULE ORAL ONCE
Status: COMPLETED | OUTPATIENT
Start: 2018-02-25 | End: 2018-02-25

## 2018-02-25 RX ORDER — MORPHINE SULFATE 4 MG/ML
2 INJECTION INTRAVENOUS
Status: DISCONTINUED | OUTPATIENT
Start: 2018-02-25 | End: 2018-02-26

## 2018-02-25 RX ORDER — ACETAMINOPHEN 325 MG/1
650 TABLET ORAL
Status: DISCONTINUED | OUTPATIENT
Start: 2018-02-25 | End: 2018-02-27 | Stop reason: HOSPADM

## 2018-02-25 RX ORDER — MORPHINE SULFATE 4 MG/ML
INJECTION INTRAVENOUS
Status: COMPLETED
Start: 2018-02-25 | End: 2018-02-25

## 2018-02-25 RX ORDER — DEXTROSE 50 % IN WATER (D50W) INTRAVENOUS SYRINGE
25-50 AS NEEDED
Status: DISCONTINUED | OUTPATIENT
Start: 2018-02-25 | End: 2018-02-27 | Stop reason: HOSPADM

## 2018-02-25 RX ORDER — PANTOPRAZOLE SODIUM 40 MG/1
40 TABLET, DELAYED RELEASE ORAL
Status: DISCONTINUED | OUTPATIENT
Start: 2018-02-25 | End: 2018-02-27 | Stop reason: HOSPADM

## 2018-02-25 RX ORDER — HYDROMORPHONE HYDROCHLORIDE 2 MG/ML
1 INJECTION, SOLUTION INTRAMUSCULAR; INTRAVENOUS; SUBCUTANEOUS ONCE
Status: COMPLETED | OUTPATIENT
Start: 2018-02-25 | End: 2018-02-25

## 2018-02-25 RX ORDER — KETOROLAC TROMETHAMINE 30 MG/ML
30 INJECTION, SOLUTION INTRAMUSCULAR; INTRAVENOUS
Status: COMPLETED | OUTPATIENT
Start: 2018-02-25 | End: 2018-02-25

## 2018-02-25 RX ORDER — DEXAMETHASONE SODIUM PHOSPHATE 4 MG/ML
10 INJECTION, SOLUTION INTRA-ARTICULAR; INTRALESIONAL; INTRAMUSCULAR; INTRAVENOUS; SOFT TISSUE ONCE
Status: DISCONTINUED | OUTPATIENT
Start: 2018-02-25 | End: 2018-02-25 | Stop reason: RX

## 2018-02-25 RX ORDER — MAGNESIUM SULFATE 100 %
4 CRYSTALS MISCELLANEOUS AS NEEDED
Status: DISCONTINUED | OUTPATIENT
Start: 2018-02-25 | End: 2018-02-27 | Stop reason: HOSPADM

## 2018-02-25 RX ORDER — ONDANSETRON 2 MG/ML
4 INJECTION INTRAMUSCULAR; INTRAVENOUS
Status: DISCONTINUED | OUTPATIENT
Start: 2018-02-25 | End: 2018-02-27 | Stop reason: HOSPADM

## 2018-02-25 RX ADMIN — DEXAMETHASONE SODIUM PHOSPHATE 10 MG: 4 INJECTION, SOLUTION INTRA-ARTICULAR; INTRALESIONAL; INTRAMUSCULAR; INTRAVENOUS; SOFT TISSUE at 15:13

## 2018-02-25 RX ADMIN — DEXAMETHASONE SODIUM PHOSPHATE 4 MG: 4 INJECTION, SOLUTION INTRAMUSCULAR; INTRAVENOUS at 23:45

## 2018-02-25 RX ADMIN — DEXAMETHASONE SODIUM PHOSPHATE 10 MG: 4 INJECTION, SOLUTION INTRA-ARTICULAR; INTRALESIONAL; INTRAMUSCULAR; INTRAVENOUS; SOFT TISSUE at 15:11

## 2018-02-25 RX ADMIN — ATORVASTATIN CALCIUM 40 MG: 40 TABLET, FILM COATED ORAL at 22:00

## 2018-02-25 RX ADMIN — KETOROLAC TROMETHAMINE 30 MG: 30 INJECTION, SOLUTION INTRAMUSCULAR; INTRAVENOUS at 15:04

## 2018-02-25 RX ADMIN — GADOTERATE MEGLUMINE 20 ML: 376.9 INJECTION INTRAVENOUS at 10:37

## 2018-02-25 RX ADMIN — SODIUM CHLORIDE 1000 MG PE: 900 INJECTION, SOLUTION INTRAVENOUS at 09:08

## 2018-02-25 RX ADMIN — POTASSIUM CHLORIDE 40 MEQ: 20 TABLET, EXTENDED RELEASE ORAL at 17:19

## 2018-02-25 RX ADMIN — MORPHINE SULFATE 2 MG: 4 INJECTION INTRAVENOUS at 23:51

## 2018-02-25 RX ADMIN — DEXAMETHASONE SODIUM PHOSPHATE 4 MG: 4 INJECTION, SOLUTION INTRAMUSCULAR; INTRAVENOUS at 17:21

## 2018-02-25 RX ADMIN — HYDROMORPHONE HYDROCHLORIDE 1 MG: 2 INJECTION, SOLUTION INTRAMUSCULAR; INTRAVENOUS; SUBCUTANEOUS at 06:59

## 2018-02-25 RX ADMIN — OXYCODONE HYDROCHLORIDE AND ACETAMINOPHEN 2 TABLET: 5; 325 TABLET ORAL at 21:20

## 2018-02-25 RX ADMIN — INSULIN LISPRO 4 UNITS: 100 INJECTION, SOLUTION INTRAVENOUS; SUBCUTANEOUS at 21:26

## 2018-02-25 RX ADMIN — PANTOPRAZOLE SODIUM 40 MG: 40 TABLET, DELAYED RELEASE ORAL at 17:19

## 2018-02-25 RX ADMIN — GABAPENTIN 300 MG: 100 CAPSULE ORAL at 15:02

## 2018-02-25 RX ADMIN — MORPHINE SULFATE 2 MG: 4 INJECTION INTRAVENOUS at 17:16

## 2018-02-25 RX ADMIN — OXYCODONE HYDROCHLORIDE AND ACETAMINOPHEN 1 TABLET: 5; 325 TABLET ORAL at 15:02

## 2018-02-25 RX ADMIN — MORPHINE SULFATE 7 MG: 4 INJECTION, SOLUTION INTRAMUSCULAR; INTRAVENOUS at 05:27

## 2018-02-25 RX ADMIN — MORPHINE SULFATE 7 MG: 4 INJECTION INTRAVENOUS at 05:27

## 2018-02-25 NOTE — IP AVS SNAPSHOT
[address] [place] [address] Patient: Hakan Darby MRN: [id number] AGC:1/[id number]/[id number] About your hospitalization You were admitted on:  February 25, 2018 You last received care in the:  [address] [place] [address] You were discharged on:  February 27, 2018 Why you were hospitalized Your primary diagnosis was:  Not on File Your diagnoses also included:  Syncope, Sprain Of Ligaments Of Cervical Spine Follow-up Information Follow up With Details Comments Contact Info Mirtha Lugo NP Schedule an appointment as soon as possible for a visit on 3/6/2018 @9:30am [address] [facility] [address] 
[phone] Jonatan Wolfe MD Schedule an appointment as soon as possible for a visit on 3/29/2018 @2:00pm [facility] [address] [facility] 
[phone] Romel Espinoza MD On 3/2/2018 @0830am, arrival time is 8:00am, bring insurance card, and MRI film. [address] [facility] [address] 
[phone] Your Scheduled Appointments Friday March 02, 2018  8:30 AM EST SURGERY CONSULT with Romel Espinoza MD  
[address] [place] [address], [address] and [facility] [address]) . [address] [facility] [address]  
[phone] Tuesday March 06, 2018  9:30 AM EST Office Visit with Mirtha Lugo NP [facility] [address] [place] [address] ([facility]) [address] [facility] [address] [facility]  
[phone] Thursday March 29, 2018  2:00 PM EDT  
POST HOSPITAL with Jonatan Wolfe MD  
[facility] [place] [address] ([address]) Donato Fried [address] [phone] Discharge Orders None A check april indicates which time of day the medication should be taken. My Medications START taking these medications Instructions Each Dose to Equal  
 Morning Noon Evening Bedtime  
 gabapentin 300 mg capsule Commonly known as:  NEURONTIN Your last dose was: Your next dose is: Take 1 Cap by mouth three (3) times daily. 300 mg  
    
   
   
   
  
 ibuprofen 600 mg tablet Commonly known as:  MOTRIN IB Your last dose was: Your next dose is: Take 1 Tab by mouth every eight (8) hours as needed for Pain (with meals). 600 mg  
    
   
   
   
  
 methylPREDNISolone 4 mg tablet Commonly known as:  MEDROL (DERIAN) Your last dose was: Your next dose is: Take 1 Tab by mouth Specific Days and Specific Times for 6 days. 4 mg OTHER Your last dose was: Your next dose is:    
   
   
 PT/OT for B/L UE 2-3 times week. Diagnosis: B/L UE radiculopathy  
     
   
   
   
  
 oxyCODONE-acetaminophen 5-325 mg per tablet Commonly known as:  PERCOCET Your last dose was: Your next dose is: Take 1-2 Tabs by mouth every six (6) hours as needed. Max Daily Amount: 8 Tabs. 1-2 Tab CONTINUE taking these medications Instructions Each Dose to Equal  
 Morning Noon Evening Bedtime  
 amLODIPine 10 mg tablet Commonly known as:  Sonyayadiel Rivera Your last dose was: Your next dose is: TAKE ONE TABLET BY MOUTH DAILY  
     
   
   
   
  
 atorvastatin 40 mg tablet Commonly known as:  LIPITOR Your last dose was: Your next dose is: TAKE ONE TABLET BY MOUTH DAILY  
     
   
   
   
  
 benazepril 40 mg tablet Commonly known as:  LOTENSIN Your last dose was: Your next dose is: TAKE ONE TABLET BY MOUTH DAILY DEXILANT 60 mg Cpdb Generic drug:  Dexlansoprazole Your last dose was: Your next dose is: Take  by mouth daily. furosemide 40 mg tablet Commonly known as:  LASIX Your last dose was: Your next dose is: TAKE ONE TABLET BY MOUTH DAILY  
     
   
   
   
  
 metFORMIN  mg tablet Commonly known as:  GLUCOPHAGE XR Your last dose was: Your next dose is: TAKE ONE TABLET BY MOUTH DAILY WITH DINNER  
     
   
   
   
  
 metOLazone 2.5 mg tablet Commonly known as:  Marcia Galla Your last dose was: Your next dose is: Take 2.5 mg by mouth daily as needed. 2.5 mg PARoxetine 20 mg tablet Commonly known as:  PAXIL Your last dose was: Your next dose is: TAKE ONE TABLET BY MOUTH DAILY  
     
   
   
   
  
 valACYclovir 1 gram tablet Commonly known as:  VALTREX Your last dose was: Your next dose is: TAKE ONE TABLET BY MOUTH THREE TIMES A DAY  
     
   
   
   
  
 VITAMIN D2 50,000 unit capsule Generic drug:  ergocalciferol Your last dose was: Your next dose is: TAKE 1 CAPSULE BY MOUTH ON TUESDAY AND THURSDAY  
     
   
   
   
  
  
STOP taking these medications   
 metoprolol tartrate 25 mg tablet Commonly known as:  LOPRESSOR Where to Get Your Medications These medications were sent to [facility] [address] [place] [address] [facility]  [facility] [address] Phone:  [phone]  
  gabapentin 300 mg capsule  
 ibuprofen 600 mg tablet  
 methylPREDNISolone 4 mg tablet Information on where to get these meds will be given to you by the nurse or doctor. ! Ask your nurse or doctor about these medications OTHER  
 oxyCODONE-acetaminophen 5-325 mg per tablet Discharge Instructions Neck Strain: Care Instructions Your Care Instructions You have strained the muscles and ligaments in your neck. A sudden, awkward movement can strain the neck. This often occurs with falls or car accidents or during certain sports. Everyday activities like working on a computer or sleeping can also cause neck strain if they force you to hold your neck in an awkward position for a long time. It is common for neck pain to get worse for a day or two after an injury, but it should start to feel better after that. You may have more pain and stiffness for several days before it gets better. This is expected. It may take a few weeks or longer for it to heal completely. Good home treatment can help you get better faster and avoid future neck problems. Follow-up care is a key part of your treatment and safety. Be sure to make and go to all appointments, and call your doctor if you are having problems. It's also a good idea to know your test results and keep a list of the medicines you take. How can you care for yourself at home? · If you were given a neck brace (cervical collar) to limit neck motion, wear it as instructed for as many days as your doctor tells you to. Do not wear it longer than you were told to. Wearing a brace for too long can make neck stiffness worse and weaken the neck muscles. · You can try using heat or ice to see if it helps. ¨ Try using a heating pad on a low or medium setting for 15 to 20 minutes every 2 to 3 hours. Try a warm shower in place of one session with the heating pad. You can also buy single-use heat wraps that last up to 8 hours. ¨ You can also try an ice pack for 10 to 15 minutes every 2 to 3 hours. · Take pain medicines exactly as directed. ¨ If the doctor gave you a prescription medicine for pain, take it as prescribed. ¨ If you are not taking a prescription pain medicine, ask your doctor if you can take an over-the-counter medicine. · Gently rub the area to relieve pain and help with blood flow. Do not massage the area if it hurts to do so. · Do not do anything that makes the pain worse. Take it easy for a couple of days. You can do your usual activities if they do not hurt your neck or put it at risk for more stress or injury. · Try sleeping on a special neck pillow. Place it under your neck, not under your head. Placing a tightly rolled-up towel under your neck while you sleep will also work. If you use a neck pillow or rolled towel, do not use your regular pillow at the same time. · To prevent future neck pain, do exercises to stretch and strengthen your neck and back. Learn how to use good posture, safe lifting techniques, and proper body mechanics. When should you call for help? Call 911 anytime you think you may need emergency care. For example, call if: 
? · You are unable to move an arm or a leg at all. ?Call your doctor now or seek immediate medical care if: 
? · You have new or worse symptoms in your arms, legs, chest, belly, or buttocks. Symptoms may include: ¨ Numbness or tingling. ¨ Weakness. ¨ Pain. ? · You lose bladder or bowel control. ? Watch closely for changes in your health, and be sure to contact your doctor if: 
? · You are not getting better as expected. Where can you learn more? Go to http://keyanna-hue.info/. Enter M253 in the search box to learn more about \"Neck Strain: Care Instructions. \" Current as of: March 21, 2017 Content Version: 11.4 © 2518-2064 Contorion. Care instructions adapted under license by [employer] (which disclaims liability or warranty for this information). If you have questions about a medical condition or this instruction, always ask your healthcare professional. [address] any warranty or liability for your use of this information. Fainting: Care Instructions Your Care Instructions When you faint, or pass out, you lose consciousness for a short time. A brief drop in blood flow to the brain often causes it. When you fall or lie down, more blood flows to your brain and you regain consciousness. Emotional stress, pain, or overheating-especially if you have been standing-can make you faint. In these cases, fainting is usually not serious. But fainting can be a sign of a more serious problem. Your doctor may want you to have more tests to rule out other causes. The treatment you need depends on the reason why you fainted. The doctor has checked you carefully, but problems can develop later. If you notice any problems or new symptoms, get medical treatment right away. Follow-up care is a key part of your treatment and safety. Be sure to make and go to all appointments, and call your doctor if you are having problems. It's also a good idea to know your test results and keep a list of the medicines you take. How can you care for yourself at home? · Drink plenty of fluids to prevent dehydration. If you have kidney, heart, or liver disease and have to limit fluids, talk with your doctor before you increase your fluid intake. When should you call for help? Call 911 anytime you think you may need emergency care. For example, call if: 
? · You have symptoms of a heart problem. These may include: ¨ Chest pain or pressure. ¨ Severe trouble breathing. ¨ A fast or irregular heartbeat. ¨ Lightheadedness or sudden weakness. ¨ Coughing up pink, foamy mucus. ¨ Passing out. After you call 911, the  may tell you to chew 1 adult-strength or 2 to 4 low-dose aspirin. Wait for an ambulance. Do not try to drive yourself. ? · You have symptoms of a stroke. These may include: 
¨ Sudden numbness, tingling, weakness, or loss of movement in your face, arm, or leg, especially on only one side of your body. ¨ Sudden vision changes. ¨ Sudden trouble speaking. ¨ Sudden confusion or trouble understanding simple statements. ¨ Sudden problems with walking or balance. ¨ A sudden, severe headache that is different from past headaches. ? · You passed out (lost consciousness) again. ? Watch closely for changes in your health, and be sure to contact your doctor if: 
? · You do not get better as expected. Where can you learn more? Go to http://keyanna-hue.info/. Enter J293 in the search box to learn more about \"Fainting: Care Instructions. \" Current as of: March 20, 2017 Content Version: 11.4 © 1163-8885 Bannerman. Care instructions adapted under license by [employer]ine[employer] (which disclaims liability or warranty for this information). If you have questions about a medical condition or this instruction, always ask your healthcare professional. [address] any warranty or liability for your use of this information. Neck Strain or Sprain: Rehab Exercises Your Care Instructions Here are some examples of typical rehabilitation exercises for your condition. Start each exercise slowly. Ease off the exercise if you start to have pain. Your doctor or physical therapist will tell you when you can start these exercises and which ones will work best for you. How to do the exercises Neck rotation 1. Sit in a firm chair, or stand up straight. 2. Keeping your chin level, turn your head to the right, and hold for 15 to 30 seconds. 3. Turn your head to the left and hold for 15 to 30 seconds. 4. Repeat 2 to 4 times to each side. Neck stretches 1. Look straight ahead, and tip your right ear to your right shoulder. Do not let your left shoulder rise up as you tip your head to the right. 2. Hold for 15 to 30 seconds. 3. Tilt your head to the left. Do not let your right shoulder rise up as you tip your head to the left. 4. Hold for 15 to 30 seconds. 5. Repeat 2 to 4 times to each side. Forward neck flexion 1. Sit in a firm chair, or stand up straight. 2. Bend your head forward. 3. Hold for 15 to 30 seconds. 4. Repeat 2 to 4 times. Lateral (side) bend strengthening 1. With your right hand, place your first two fingers on your right temple. 2. Start to bend your head to the side while using gentle pressure from your fingers to keep your head from bending. 3. Hold for about 6 seconds. 4. Repeat 8 to 12 times. 5. Switch hands and repeat the same exercise on your left side. Forward bend strengthening 1. Place your first two fingers of either hand on your forehead. 2. Start to bend your head forward while using gentle pressure from your fingers to keep your head from bending. 3. Hold for about 6 seconds. 4. Repeat 8 to 12 times. Neutral position strengthening 1. Using one hand, place your fingertips on the back of your head at the top of your neck. 2. Start to bend your head backward while using gentle pressure from your fingers to keep your head from bending. 3. Hold for about 6 seconds. 4. Repeat 8 to 12 times. Chin tuck 1. Lie on the floor with a rolled-up towel under your neck. Your head should be touching the floor. 2. Slowly bring your chin toward your chest. 
3. Hold for a count of 6, and then relax for up to 10 seconds. 4. Repeat 8 to 12 times. Follow-up care is a key part of your treatment and safety. Be sure to make and go to all appointments, and call your doctor if you are having problems. It's also a good idea to know your test results and keep a list of the medicines you take. Where can you learn more? Go to http://keyanna-hue.info/. Enter M679 in the search box to learn more about \"Neck Strain or Sprain: Rehab Exercises. \" Current as of: March 21, 2017 Content Version: 11.4 © 0053-9410 ViperMed. Care instructions adapted under license by [employer] (which disclaims liability or warranty for this information). If you have questions about a medical condition or this instruction, always ask your healthcare professional. [address] any warranty or liability for your use of this information. Discharge Instructions Patient: Kanu Foreman MRN: [id number]  CSN: [id number] YOB: 1955  Age: 61 y.o. Sex: male DOA: 2/25/2018 LOS:  LOS: 2 days   Discharge Date: DIET:  Cardiac Diet and Diabetic Diet ACTIVITY: Activity as tolerated · Out patient PT/OT consult ADDITIONAL INFORMATION: If you experience any of the following symptoms but not limited to Fever, chills, nausea, vomiting, diarrhea, change in mentation, falling, bleeding, shortness of breath, chest pain, please call your primary care physician or return to the emergency room if you cannot get hold of your doctor:  
 
FOLLOW UP CARE: 
Dr. Richa Zhou MD in 7-10 days. Please call and set up an appointment. Dr. Gab Garcia in 2 week Dr. Edita Yang in 4 week Rosanna Spears MD 
2/27/2018 9:49 AM 
 
 
 
 
 
  
  
  
ReadyCart Announcement We are excited to announce that we are making your provider's discharge notes available to you in ReadyCart. You will see these notes when they are completed and signed by the physician that discharged you from your recent hospital stay. If you have any questions or concerns about any information you see in ReadyCart, please call the Health Information Department where you were seen or reach out to your Primary Care Provider for more information about your plan of care. Introducing [facility] [place] [facility]! Dear Jeremías Zimmerman: 
Thank you for requesting a ReadyCart account. Our records indicate that you already have an active ReadyCart account. You can access your account anytime at [email or web address] Did you know that you can access your hospital and ER discharge instructions at any time in OrderMyGear? You can also review all of your test results from your hospital stay or ER visit. Additional Information If you have questions, please visit the Frequently Asked Questions section of the OrderMyGear website at [email or web address] Remember, [employer] Games[employer] is NOT to be used for urgent needs. For medical emergencies, dial 911. Now available from your iPhone and Android! Providers Seen During Your Hospitalization Provider Specialty Primary office phone Felicity Martinez MD Emergency Medicine [phone] Sue Anand MD Internal Medicine [phone] Immunizations Administered for This Admission Name Date Influenza Vaccine (Quad) PF 2/26/2018 Your Primary Care Physician (PCP) Primary Care Physician Office Phone Office Fax Roderick Jordan [phone] You are allergic to the following Allergen Reactions Qsymia (Phentermine-Topiramate) Other (comments) Dulled his thinking Wellbutrin (Bupropion Hcl) Other (comments) Patient said it made him feel funny Recent Documentation Height Weight BMI Smoking Status 1.829 m 121.8 kg 36.42 kg/m2 Former Smoker Emergency Contacts Name Discharge Info Relation Home Work Mobile Maricarmen Barker DISCHARGE CAREGIVER [3] Spouse [3] [phone] Patient Belongings The following personal items are in your possession at time of discharge: 
  Dental Appliances: None  Visual Aid: Glasses, With patient      Home Medications: None   Jewelry: None  Clothing: At bedside    Other Valuables: None  Personal Items Sent to Safe: none Please provide this summary of care documentation to your next provider. Signatures-by signing, you are acknowledging that this After Visit Summary has been reviewed with you and you have received a copy. Patient Signature:  ____________________________________________________________ Date:  ____________________________________________________________  
  
Brooke [place] Provider Signature:  ____________________________________________________________ Date:  ____________________________________________________________

## 2018-02-25 NOTE — Clinical Note
Status[de-identified] Inpatient [101] Type of Bed: Telemetry [19] Inpatient Hospitalization Certified Necessary for the Following Reasons: 9. Other (further clarification in H&P documentation) Admitting Diagnosis: Sprain of ligaments of cervical spine [7971332] Admitting Diagnosis: Syncope [455033] Admitting Physician: Josemanuel Brasher [[id number]] Attending Physician: Josemanuel Brasher [[id number]] Estimated Length of Stay: 2 Midnights Discharge Plan[de-identified] Home with Office Follow-up

## 2018-02-25 NOTE — IP AVS SNAPSHOT
[address] Patient: Hakan Darby MRN: [id number] VBT:8/20/3347 A check april indicates which time of day the medication should be taken. My Medications START taking these medications Instructions Each Dose to Equal  
 Morning Noon Evening Bedtime  
 gabapentin 300 mg capsule Commonly known as:  NEURONTIN Your last dose was: Your next dose is: Take 1 Cap by mouth three (3) times daily. 300 mg  
    
   
   
   
  
 ibuprofen 600 mg tablet Commonly known as:  MOTRIN IB Your last dose was: Your next dose is: Take 1 Tab by mouth every eight (8) hours as needed for Pain (with meals). 600 mg  
    
   
   
   
  
 methylPREDNISolone 4 mg tablet Commonly known as:  MEDROL (DERIAN) Your last dose was: Your next dose is: Take 1 Tab by mouth Specific Days and Specific Times for 6 days. 4 mg OTHER Your last dose was: Your next dose is:    
   
   
 PT/OT for B/L UE 2-3 times week. Diagnosis: B/L UE radiculopathy  
     
   
   
   
  
 oxyCODONE-acetaminophen 5-325 mg per tablet Commonly known as:  PERCOCET Your last dose was: Your next dose is: Take 1-2 Tabs by mouth every six (6) hours as needed. Max Daily Amount: 8 Tabs. 1-2 Tab CONTINUE taking these medications Instructions Each Dose to Equal  
 Morning Noon Evening Bedtime  
 amLODIPine 10 mg tablet Commonly known as:  Tylene Farooq Your last dose was: Your next dose is: TAKE ONE TABLET BY MOUTH DAILY  
     
   
   
   
  
 atorvastatin 40 mg tablet Commonly known as:  LIPITOR Your last dose was: Your next dose is: TAKE ONE TABLET BY MOUTH DAILY  
     
   
   
   
  
 benazepril 40 mg tablet Commonly known as:  LOTENSIN Your last dose was: Your next dose is: TAKE ONE TABLET BY MOUTH DAILY DEXILANT 60 mg Cpdb Generic drug:  Dexlansoprazole Your last dose was: Your next dose is: Take  by mouth daily. furosemide 40 mg tablet Commonly known as:  LASIX Your last dose was: Your next dose is: TAKE ONE TABLET BY MOUTH DAILY  
     
   
   
   
  
 metFORMIN  mg tablet Commonly known as:  GLUCOPHAGE XR Your last dose was: Your next dose is: TAKE ONE TABLET BY MOUTH DAILY WITH DINNER  
     
   
   
   
  
 metOLazone 2.5 mg tablet Commonly known as:  Keshia Sang Your last dose was: Your next dose is: Take 2.5 mg by mouth daily as needed. 2.5 mg PARoxetine 20 mg tablet Commonly known as:  PAXIL Your last dose was: Your next dose is: TAKE ONE TABLET BY MOUTH DAILY  
     
   
   
   
  
 valACYclovir 1 gram tablet Commonly known as:  VALTREX Your last dose was: Your next dose is: TAKE ONE TABLET BY MOUTH THREE TIMES A DAY  
     
   
   
   
  
 VITAMIN D2 50,000 unit capsule Generic drug:  ergocalciferol Your last dose was: Your next dose is: TAKE 1 CAPSULE BY MOUTH ON TUESDAY AND THURSDAY  
     
   
   
   
  
  
STOP taking these medications   
 metoprolol tartrate 25 mg tablet Commonly known as:  LOPRESSOR Where to Get Your Medications These medications were sent to [address] [facility]  [place] [address] Phone:  [phone]  
  gabapentin 300 mg capsule  
 ibuprofen 600 mg tablet  
 methylPREDNISolone 4 mg tablet Information on where to get these meds will be given to you by the nurse or doctor. ! Ask your nurse or doctor about these medications OTHER  
 oxyCODONE-acetaminophen 5-325 mg per tablet

## 2018-02-25 NOTE — ED NOTES
Patient in room A&O x 4, no signs of distress noted, patient on monitor and resting comfortably on stretcher with the call bell within reach, will continue to monitor.

## 2018-02-25 NOTE — ED PROVIDER NOTES
EMERGENCY DEPARTMENT HISTORY AND PHYSICAL EXAM    4:56 AM      Date: 2/25/2018  Patient Name: Brett Napoles    History of Presenting Illness     Chief Complaint   Patient presents with   [facility] Fall    Syncope    Arm Pain         History Provided By: Patient    Chief Complaint: bilateral arm pain  Duration:  Hours  Timing:  Acute  Location: arms/ neuro  Quality: Burning  Severity: Severe  Modifying Factors: none  Associated Symptoms: fall and syncope      Additional History (Context): Brett Napoles is a 61 y.o. male with diabetes and hypertension who presents to the ED via EMS due to severe and constant bilateral arm pain that started after a fall/sycopal episode a couple hours before his arrival to the ED. The pt state he was trying to make BM when he fell off the commode; says after falling he doesn't remember what happened. He was found on the floor by his wife. The pt his his head when he fell. The pt also complaining of weakness in both hands bilaterally as well. Says it feels like he grab things. The pt states he is in pain while presenting. The pt denies CP, SOB, HA, numbness, nausea, vomiting, fever, and chills. Th ept had no other complaints or concerns in the ED.     PCP: Richa Zhou MD    Current Facility-Administered Medications   Medication Dose Route Frequency Provider Last Rate Last Dose    ketorolac (TORADOL) injection 15 mg  15 mg IntraVENous Sharon Tobin MD        atorvastatin (LIPITOR) tablet 40 mg  40 mg Oral QHS Rosanna Spears MD   40 mg at 02/25/18 2200    amLODIPine (NORVASC) tablet 5 mg  5 mg Oral DAILY Rosanna Spears MD        PARoxetine (PAXIL) tablet 20 mg  20 mg Oral DAILY Rosanna Spears MD        acetaminophen (TYLENOL) tablet 650 mg  650 mg Oral Q4H PRN Rosanna Spears MD        ondansetron (ZOFRAN) injection 4 mg  4 mg IntraVENous Q4H PRN Rosanna Spears MD        magnesium hydroxide (MILK OF MAGNESIA) 400 mg/5 mL oral suspension 30 mL  30 mL Oral DAILY PRN Cammy Gutierrez MD        morphine 2 mg  2 mg IntraVENous Q4H PRN Cammy Gutierrez MD   2 mg at 02/25/18 2351    dexamethasone (DECADRON) 4 mg/mL injection 4 mg  4 mg IntraVENous Q6H Cammy Gutierrez MD   4 mg at 02/25/18 2345    pantoprazole (PROTONIX) tablet 40 mg  40 mg Oral ACB Cammy Gutierrez MD   40 mg at 02/25/18 1719    oxyCODONE-acetaminophen (PERCOCET) 5-325 mg per tablet 1-2 Tab  1-2 Tab Oral Q6H PRN Cammy Gutierrez MD   2 Tab at 02/25/18 2120    insulin lispro (HUMALOG) injection   SubCUTAneous AC&HS Cammy Gutierrez MD   4 Units at 02/25/18 2126    glucose chewable tablet 16 g  4 Tab Oral PRN Cammy Gutierrez MD        glucagon (GLUCAGEN) injection 1 mg  1 mg IntraMUSCular PRN Cammy Gutierrez MD        dextrose (D50W) injection syrg 12.5-25 g  25-50 mL IntraVENous PRN Cammy Gutierrez MD           Past History     Past Medical History:  Past Medical History:   Diagnosis Date    Anxiety     Arthritis     between C4 and C5    Basal cell carcinoma     s/p resection    Carotid duplex 02/04/2013    No stenosis >49% bilaterally.  Carpal tunnel syndrome     Diabetes ([facility] [address] 05/2016    fbs>125 x2    Dyslipidemia     Fatty liver on Us 1/02     Fib-4 was 0.43 from 9/14    GERD (gastroesophageal reflux disease)     History of myocardial perfusion scan 01/21/2013    Mild fixed basal inferior, mid inferior, inferoapical defect likely artifact, but inrafction in RCA not entirely excluded. No ischemia. EF 53%. No WMA. Neg EKG on max EST. Ex time 8 min 15 sec.       Hypertension     Hypovitaminosis D     Low serum testosterone level 2012    negative w/u Dr. Peña Russ Morbid obesity ([facility]     peak weight 286 lbs, bmi 38.8 from 7/11; intol qsymia, wellbutrin    Normal stress echo 03/31/2015    Normal study after maximal exercise. No symptoms. EF 60%. Ex time 9 min.  Prediabetes     Rhinophyma     S/P cardiac cath 01/22/2002    Patent coronary arteries. LVEDP 12. EF 60%.  Sinus bradycardia     Asymptomatic in 2011    Sleep apnea mild Dr. Be Haley 2011     AHI 2.3, minimum desats 83% 2011 Dr Yamila Michelle VSD (ventricular septal defect) 2011    Noted on echo, with flow noted between the aorta near the aortic valve and the RV outflow tract/proximal main PA. This can be consistent with a small outlet VSD    Zoster     3 prior episodes in right CN5 distribution? suspect hsv instead       Past Surgical History:  Past Surgical History:   Procedure Laterality Date    HX COLONOSCOPY      Dr. Bobbi Murphy 2007 negative; 8/22/17 divertics    HX HEART CATHETERIZATION  1/22/02    The right coronary artery is large and dominant. It is widely patent. The left main coronary artery is widely patent. The left anterior descending coronary artery is widely patent. The circumflex coronary artery is widely patent. LVEDP is 12 mmHg. Overall left ventricular systolic function is within normal limits with an EF of 60%. No significant MR or AS is noted.    [address]    surgery for deviated septum    HX ORTHOPAEDIC Right 1976    knee cartilage removed right knee    HX ORTHOPAEDIC Left 2012    CTR    HX ORTHOPAEDIC      Bilateral hip replacement Dr Jonelle Flores Right     trigger release 4th finger hand    HX VASECTOMY      VASCULAR SURGERY PROCEDURE UNLIST  2/13    renal artery dopplers neg    VASECTOMY         Family History:  Family History   Problem Relation Age of Onset    Diabetes Mother     Hypertension Father     Cancer Father      bladder       Social History:  Social History   Substance Use Topics    Smoking status: Former Smoker     Packs/day: 1.00     Years: 10.00     Quit date: 11/26/1987    Smokeless tobacco: Never Used    Alcohol use 1.0 oz/week     2 Standard drinks or equivalent per week Comment: social weekly       Allergies: Allergies   Allergen Reactions    Qsymia [Phentermine-Topiramate] Other (comments)     Dulled his thinking    Wellbutrin [Bupropion Hcl] Other (comments)     Patient said it made him feel funny         Review of Systems       Review of Systems   Constitutional: Negative for chills, fatigue and fever. HENT: Negative. Negative for sore throat. Eyes: Negative. Respiratory: Negative for cough and shortness of breath. Cardiovascular: Negative for chest pain and palpitations. Gastrointestinal: Negative for abdominal pain, diarrhea, nausea and vomiting. Genitourinary: Negative for dysuria. Musculoskeletal: Positive for myalgias. Skin: Negative. Neurological: Positive for syncope and weakness. Negative for dizziness, light-headedness and headaches. Psychiatric/Behavioral: Negative. All other systems reviewed and are negative. Physical Exam     Visit Vitals    BP (!) 152/92 (BP 1 Location: Left arm, BP Patient Position: At rest)  Comment: nurse aware    Pulse 71    Temp 98.5 °F (36.9 °C)    Resp 18    Ht 6' (1.829 m)    Wt 123.8 kg (273 lb)    SpO2 94%    BMI 37.03 kg/m2         Physical Exam   Constitutional: He is oriented to person, place, and time. He appears well-developed and well-nourished. No distress. HENT:   Head: Normocephalic and atraumatic. Eyes: Conjunctivae and EOM are normal. Right eye exhibits no discharge. Left eye exhibits no discharge. No scleral icterus. Neck: Normal range of motion. Neck supple. No tracheal deviation present. Cardiovascular: Normal rate, regular rhythm and normal heart sounds. No murmur heard. Pulmonary/Chest: Effort normal and breath sounds normal. No respiratory distress. He has no wheezes. He has no rales. Abdominal: Soft. He exhibits no distension. There is no tenderness. There is no rebound and no guarding. Musculoskeletal: Normal range of motion.  He exhibits no edema or deformity. Neurological: He is alert and oriented to person, place, and time. No cranial nerve deficit. Skin: Skin is warm and dry. He is not diaphoretic. Psychiatric: He has a normal mood and affect. His behavior is normal. Judgment and thought content normal.   Nursing note and vitals reviewed. Diagnostic Study Results     Labs -  Recent Results (from the past 12 hour(s))   GLUCOSE, POC    Collection Time: 02/25/18  5:09 PM   Result Value Ref Range    Glucose (POC) 126 (H) 70 - 110 mg/dL   CARDIAC PANEL,(CK, CKMB & TROPONIN)    Collection Time: 02/25/18  5:36 PM   Result Value Ref Range     (H) 39 - 308 U/L    CK - MB 6.6 (H) <3.6 ng/ml    CK-MB Index 1.2 0.0 - 4.0 %    Troponin-I, Qt. <0.02 0.0 - 0.045 NG/ML   GLUCOSE, POC    Collection Time: 02/25/18  9:25 PM   Result Value Ref Range    Glucose (POC) 241 (H) 70 - 110 mg/dL   CARDIAC PANEL,(CK, CKMB & TROPONIN)    Collection Time: 02/25/18  9:50 PM   Result Value Ref Range     (H) 39 - 308 U/L    CK - MB 5.0 (H) <3.6 ng/ml    CK-MB Index 1.0 0.0 - 4.0 %    Troponin-I, Qt. <0.02 0.0 - 0.045 NG/ML       Radiologic Studies -   MRI CERV SPINE W WO CONT   Final Result      CT SPINE CERV WO CONT   Final Result      CT HEAD WO CONT   Final Result        CT head WO CONT:  IMPRESSION:   Mild right forehead scalp contusion. Otherwise unremarkable CT of the head. CT Spine CERV WO CONT :  IMPRESSION:  1. No CT signs of cervical spine trauma. 2. Degenerative  findings as above.               Medical Decision Making   I am the first provider for this patient. I reviewed the vital signs, available nursing notes, past medical history, past surgical history, family history and social history. Vital Signs-Reviewed the patient's vital signs. Pulse Oximetry Analysis -  100 on room air (Interpretation)    EKG: Interpreted by the EP.    Time Interpreted: 520   Rate: 57   Rhythm: Sinus Bradycardia   Interpretation:No STEMI    Records Reviewed: Nursing Notes and Old Medical Records (Time of Review: 4:56 AM)    ED Course: Progress Notes, Reevaluation, and Consults:  7:15 AM : Pt care transferred to Dr. Socrates Berry  ,ED provider. History of patient complaint(s), available diagnostic reports and current treatment plan has been discussed thoroughly. Bedside rounding on patient occured : yes . Intended disposition of patient : pending  Pending diagnostics reports and/or labs (please list): pain control and reaval    Provider Notes (Medical Decision Making): Pt with arm pain and numbness after injury to the neck. CT negative. Diagnosis     Clinical Impression:   1.  Injury of neck, initial encounter        Disposition: TBD    Follow-up Information     None           Current Discharge Medication List      CONTINUE these medications which have NOT CHANGED    Details   furosemide (LASIX) 40 mg tablet TAKE ONE TABLET BY MOUTH DAILY  Qty: 90 Tab, Refills: 0      benazepril (LOTENSIN) 40 mg tablet TAKE ONE TABLET BY MOUTH DAILY  Qty: 30 Tab, Refills: 8    Associated Diagnoses: Primary hypertension      amLODIPine (NORVASC) 10 mg tablet TAKE ONE TABLET BY MOUTH DAILY  Qty: 90 Tab, Refills: 1      atorvastatin (LIPITOR) 40 mg tablet TAKE ONE TABLET BY MOUTH DAILY  Qty: 90 Tab, Refills: 3    Associated Diagnoses: Dyslipidemia      metFORMIN ER (GLUCOPHAGE XR) 500 mg tablet TAKE ONE TABLET BY MOUTH DAILY WITH DINNER  Qty: 30 Tab, Refills: 10    Associated Diagnoses: Diabetes mellitus type 2, controlled (HCC)      PARoxetine (PAXIL) 20 mg tablet TAKE ONE TABLET BY MOUTH DAILY  Qty: 30 Tab, Refills: 10      VITAMIN D2 50,000 unit capsule TAKE 1 CAPSULE BY MOUTH ON TUESDAY AND THURSDAY  Qty: 26 Cap, Refills: 0    Associated Diagnoses: Hypovitaminosis D      valACYclovir (VALTREX) 1 gram tablet TAKE ONE TABLET BY MOUTH THREE TIMES A DAY  Qty: 21 Tab, Refills: 0    Associated Diagnoses: Herpes zoster without complication      metolazone (ZAROXOLYN) 2.5 mg tablet Take 2.5 mg by mouth daily as needed. metoprolol (LOPRESSOR) 25 mg tablet Take 1 Tab by mouth two (2) times a day. Qty: 60 Tab, Refills: 1    Associated Diagnoses: Primary hypertension      Dexlansoprazole (DEXILANT) 60 mg CpDM Take  by mouth daily. Associated Diagnoses: Gastroesophageal reflux disease without esophagitis           _______________________________    Attestations:  Harsh Harrell acting as a scribe for and in the presence of Francesco Acevedo MD      February 25, 2018 at 4:56 AM       Provider Attestation:      I personally performed the services described in the documentation, reviewed the documentation, as recorded by the scribe in my presence, and it accurately and completely records my words and actions.  February 25, 2018 at 4:56 AM - Francesco Acevedo MD    _______________________________

## 2018-02-25 NOTE — H&P
[facility]  HISTORY AND PHYSICAL      Name:Alin GIBSON  MR#: [id number]  : 1955  ACCOUNT #: [de-identified]   ADMIT DATE: 2018    PRIMARY CARE PHYSICIAN:  Dr. Tessy Gomez:  Syncope at home. HISTORY OF PRESENTING ILLNESS:  A 49-year-old  male with known history of hypertension, history of cervical degenerative disk disease, comes to the emergency room after syncope at home. Per patient, he woke up around 3 a.m. to go to the bathroom. He did feel fine, did not have any chest pain, no shortness of breath and no dizziness. He sat down on the commode and after that he does not know what happened, HE woke up on the floor. Patient says he also hit his head on the front of the door. After he woke up, he felt severe pain in his both upper extremities extending from his elbow to the arms. He did not see any swelling or anything. Did not hurt his hands, either. Patient states his pain was so bad he could not tolerate it. He also felt some pain in the posterior part of his neck. Patient said he went back to bed, tried to lie down, but he could not sleep because of his pain getting worse and worse, so decided to call 911 and go to the hospital.      Patient says when he was in the bathroom, he thinks he actually urinated, but otherwise he does not remember what happened at that time, but he is very sure that when he woke up he did not have any chest pain, no dizziness, lightheadedness, and he did not have any symptoms prior to the syncope, either. Currently, patient states he is having severe burning, like something is fire sensation from his elbow all the way to his fingers, mostly in the ulnar side and also on the dorsum of the hand and ulnar side of the forearm. It is bilaterally. He grades his severity as 10/10. He also complains of neck pain, but he says he cannot give severity, but it is worse than normally what he has.   He also complains of feeling weak in his both arms as if he cannot  his hand well. He is able to move his hands well, but he says his  is significantly reduced. Patient also was seen by Teleneurology, recommended a cervical MRI, which was done, which showed some cervical compression at C5-C6. Case has been discussed with Dr. Agueda Winkler, spine surgery. Recommended steroids, and was recommended hospitalization. PAST MEDICAL HISTORY  1. Hypertension. 2.  Dyslipidemia. 3.  Obesity. 4. Low vitamin levels. 5.  History of MI in . 6.  GERD. 7.  Arthritis. PAST SURGICAL HISTORY   1.  Vasectomy. 2.  Heart catheterization in . 3.  Orthopedic surgery for the knee cartilage removed in the past.    FAMILY HISTORY:  Both mother and father . Brother is alive. Father had hypertension. Mother had diabetes. SOCIAL HISTORY:  Used to be former smoker, quit in , smoked for almost 10-pack years. Admits drinking socially. Denies any drug use. ALLERGIES:  HE IS ALLERGIC TO QSYMIA AND WELLBUTRIN. HOME MEDICATIONS:  Per patient, he is on Lasix 40 mg daily, benazepril 40 mg daily, Zaroxolyn as needed, Dexilant 60 mg daily, amlodipine 10 mg daily, Lipitor 40 mg at bedtime, Paxil 20 mg daily. PHYSICAL EXAMINATION  GENERAL:  Patient is  male who was alert, awake, oriented x3, pleasant and communicative, in mild distress because of his pain. VITAL SIGNS:  Temperature 97.6, pulse 60, blood pressure 103/58, respiratory 10, saturating 98% on room air. HEENT:  PERRLA, EOMI. Oral mucosa moist.  No nasal discharge. Throat clear. Small abrasion on R forehead with small hematoma   NECK:  Supple. No JVD. Trachea midline. No thyromegaly. No carotid bruit heard. LYMPHATICS:  No supraclavicular or cervical lymph nodes are seen. RESPIRATORY:  Bilaterally clear on auscultation. No wheezing, no rhonchi heard. CARDIOVASCULAR:  S1, S2 heard, regular rate and rhythm.   ABDOMEN:  Bowel sounds present, soft, nontender, nondistended. NEUROLOGIC:  Patient alert, awake, oriented x3. Moves all 4 extremities without any problem. He states he has severe paresthesia, bilateral upper extremities, would not let me touch because he says the pain is very high. I could not test his hand  either, but he says his handgrip is low, but otherwise he is moving all extremities without any problem. Motor strength in his lower extremities 5/5. Deep tendon reflexes in lower extremities +1. EXTREMITIES:  No clubbing, no cyanosis, no edema. SKIN:  No rashes. LABORATORY DATA:  WBC 10.4, hemoglobin 14.6, hematocrit 40.7, platelets 590. Sodium 137, potassium 3.3, chloride 22, anion gap of 12, glucose 180, BUN 15, creatinine 0.95, albumin 3.8, ALT 49, AST 31. , troponin less than 0.02. Patient had a CT head which showed mild right forehead scalp contusion. Patient had a CT C-spine which showed degenerative findings, otherwise no spine injury noted. The patient had MRI C-spine which showed no evidence of fracture, subluxation or contusion; mild prevertebral soft tissue edema, C4-T2; mild C5-C6 posterior interspinous edema, overall suggesting minor injury or sprain of the cervical spine without evidence of major ligamentous disruption; multilevel chronic degenerative changes, most significant at C5-C6, with a large disk osteophyte complex or calcified disk herniation resulting in significant compression of the cord and severe canal stenosis. The patient had EKG, which has been read as sinus maribell, otherwise normal EKG. No significant change from the previous EKG. ASSESSMENT AND PLAN:  This is a 51-year-old  male who presented to the emergency room status post syncope and after which he developed bilateral upper extremity severe paraesthesia and pain. Patient had workup in the ED which showed radiculopathy with concern for cord compression or injury.   Case has been discussed with spine surgeon, Dr. Luis Duran, who recommended admission and he will follow up the patient in the morning. Assessment and plan are as follows:     1. Syncope. Most likely looks vasovagal due to post-micturition, but will get serial EKGs, cardiac enzymes. Will monitor on the tele monitoring. 2.  Cervical cord compression with possibility cord injury with the fall. Patient will be started on Decadron. Spine surgery, Dr. Luis Duran, has been consulted. Further plan according to the spine surgery team.  We will keep him n.p.o. past midnight just in case patient needs to go to surgery. 3.  Cervical radiculopathy with severe pain and burning in his upper extremities from #2. Will start patient on morphine as needed. Patient has been given gabapentin in the ED. He was also given fosphenytoin per Teleneurology recommendation. 4.  Hypertension. The patient's blood pressure currently on the lower side, so I am going to hold his ACE inhibitor. Continue on amlodipine at a lower dose with holding parameters. 5.  Dyslipidemia. Will continue statin. 6.  Hypokalemia. Will replete. 7.  Mild elevated CPK, possibly from fall. Will repeat his CPK in the morning. No IV fluids as patient is on Lasix for some reason, so I am currently going to hold Lasix and follow up in the morning. Patient alert, awake, oriented x3. Discussed with the patient about the hospital admission and plan of care, he agree with the plan. Also answered questions and concerns at the bedside appropriately. He agreed with the plan of care. I also discussed with him about advance directives. He wants to be FULL CODE.       Radames Callahan MD       BT/PN  D: 02/25/2018 15:23     T: 02/25/2018 17:15  JOB #: [id number]  CC: Joleen Osullivan MD  [address]  [place], [address]  CC: Kalli Tineo MD

## 2018-02-25 NOTE — ED NOTES
7:15 AM :Pt care assumed from Dr. Corrie Mejia , ED provider. Pt complaint(s), current treatment plan, progression and available diagnostic results have been discussed thoroughly. Rounding occurred: yes  Intended Disposition: TBD   Pending diagnostic reports and/or labs (please list): pain control and reevaluation    8:08 AM Consult: I discussed care with Dr. Payton Blankenship (Teleneurology). It was a standard discussion including patient history, chief complaint, available diagnostic results, and predicted treatment course. Will consult the pt.     8:18 AM Consult: I discussed care with Dr. Payton Blankenship (Teleneurology). It was a standard discussion including patient history, chief complaint, available diagnostic results, and predicted treatment course. Agrees with MRI C-Spine. Recommends dose of fosphenytoin for neuropathic pain. 8:23 AM Consult: I discussed care with Dr. Payton Blankenship (Teleneurology). It was a standard discussion including patient history, chief complaint, available diagnostic results, and predicted treatment course. States he would like MRI with contrast.     12:13 PM Consult: I discussed care with Dr. Payton Blankenship (Teleneurology). It was a standard discussion including patient history, chief complaint, available diagnostic results, and predicted treatment course. Recommends Gabapentin, Decadron and consider outpt physical therapy and neurology f/u.     12:30 pm I discussed his care with surgeon Dr. Shasha Burger. He recommended the patient be admitted. If no significant improvement on decadron, said he would take the patient for decompression. Recommended admission and he will consult. Patient admitted to the hospitalist service. MRI CERV SPINE W WO CONT   IMPRESSION  1. No evidence for fracture, subluxation or contusion.  Mild prevertebral soft tissue edema C4-T2 1 mild C5/6 posterior interspinous edema, overall suggesting a minor injury/sprain to the cervical spine without evidence for major ligamentous disruption. 2. Multilevel chronic degenerative changes most significant at C5/6 with large disc osteophyte complex or calcified disc herniation resulting in significant compression of the cord and severe central stenosis. No myelopathy. 3. Multilevel lateral noncritical foraminal stenosis at other levels. Multilevel high-grade foraminal stenosis. Scribe [address] [place] [address] acting as a scribe for and in the presence of Susan Abraham MD      February 25, 2018 at 8:09 AM       Provider Attestation:      I personally performed the services described in the documentation, reviewed the documentation, as recorded by the scribe in my presence, and it accurately and completely records my words and actions.  February 25, 2018 at 8:09 AM - Susan Abraham MD

## 2018-02-26 LAB
ANION GAP SERPL CALC-SCNC: 16 MMOL/L (ref 3–18)
APPEARANCE UR: ABNORMAL
BACTERIA URNS QL MICRO: ABNORMAL /HPF
BILIRUB UR QL: NEGATIVE
BUN SERPL-MCNC: 16 MG/DL (ref 7–18)
BUN/CREAT SERPL: 19 (ref 12–20)
CALCIUM SERPL-MCNC: 9.1 MG/DL (ref 8.5–10.1)
CHLORIDE SERPL-SCNC: 106 MMOL/L (ref 100–108)
CK MB CFR SERPL CALC: 1 % (ref 0–4)
CK MB SERPL-MCNC: 4.1 NG/ML (ref 5–25)
CK SERPL-CCNC: 408 U/L (ref 39–308)
CO2 SERPL-SCNC: 18 MMOL/L (ref 21–32)
COLOR UR: YELLOW
CREAT SERPL-MCNC: 0.85 MG/DL (ref 0.6–1.3)
EPITH CASTS URNS QL MICRO: ABNORMAL /LPF (ref 0–5)
GLUCOSE BLD STRIP.AUTO-MCNC: 131 MG/DL (ref 70–110)
GLUCOSE BLD STRIP.AUTO-MCNC: 152 MG/DL (ref 70–110)
GLUCOSE BLD STRIP.AUTO-MCNC: 169 MG/DL (ref 70–110)
GLUCOSE BLD STRIP.AUTO-MCNC: 171 MG/DL (ref 70–110)
GLUCOSE SERPL-MCNC: 133 MG/DL (ref 74–99)
GLUCOSE UR STRIP.AUTO-MCNC: NEGATIVE MG/DL
HGB UR QL STRIP: NEGATIVE
KETONES UR QL STRIP.AUTO: ABNORMAL MG/DL
LEUKOCYTE ESTERASE UR QL STRIP.AUTO: NEGATIVE
MUCOUS THREADS URNS QL MICRO: ABNORMAL /LPF
NITRITE UR QL STRIP.AUTO: NEGATIVE
PH UR STRIP: 5.5 [PH] (ref 5–8)
POTASSIUM SERPL-SCNC: 4.1 MMOL/L (ref 3.5–5.5)
PROT UR STRIP-MCNC: ABNORMAL MG/DL
SODIUM SERPL-SCNC: 140 MMOL/L (ref 136–145)
SP GR UR REFRACTOMETRY: 1.03 (ref 1–1.03)
TROPONIN I SERPL-MCNC: <0.02 NG/ML (ref 0–0.04)
UROBILINOGEN UR QL STRIP.AUTO: 1 EU/DL (ref 0.2–1)
WBC URNS QL MICRO: ABNORMAL /HPF (ref 0–4)

## 2018-02-26 PROCEDURE — 74011250636 HC RX REV CODE- 250/636: Performed by: HOSPITALIST

## 2018-02-26 PROCEDURE — 97116 GAIT TRAINING THERAPY: CPT

## 2018-02-26 PROCEDURE — 90471 IMMUNIZATION ADMIN: CPT

## 2018-02-26 PROCEDURE — 82550 ASSAY OF CK (CPK): CPT | Performed by: HOSPITALIST

## 2018-02-26 PROCEDURE — 97165 OT EVAL LOW COMPLEX 30 MIN: CPT

## 2018-02-26 PROCEDURE — 74011250636 HC RX REV CODE- 250/636: Performed by: ORTHOPAEDIC SURGERY

## 2018-02-26 PROCEDURE — 82962 GLUCOSE BLOOD TEST: CPT

## 2018-02-26 PROCEDURE — 74011250637 HC RX REV CODE- 250/637: Performed by: ORTHOPAEDIC SURGERY

## 2018-02-26 PROCEDURE — 74011250637 HC RX REV CODE- 250/637: Performed by: HOSPITALIST

## 2018-02-26 PROCEDURE — 97535 SELF CARE MNGMENT TRAINING: CPT

## 2018-02-26 PROCEDURE — 80048 BASIC METABOLIC PNL TOTAL CA: CPT | Performed by: HOSPITALIST

## 2018-02-26 PROCEDURE — 81001 URINALYSIS AUTO W/SCOPE: CPT | Performed by: HOSPITALIST

## 2018-02-26 PROCEDURE — 97161 PT EVAL LOW COMPLEX 20 MIN: CPT

## 2018-02-26 PROCEDURE — 97530 THERAPEUTIC ACTIVITIES: CPT

## 2018-02-26 PROCEDURE — 90686 IIV4 VACC NO PRSV 0.5 ML IM: CPT | Performed by: HOSPITALIST

## 2018-02-26 PROCEDURE — 74011250636 HC RX REV CODE- 250/636: Performed by: INTERNAL MEDICINE

## 2018-02-26 PROCEDURE — 74011636637 HC RX REV CODE- 636/637: Performed by: HOSPITALIST

## 2018-02-26 PROCEDURE — 36415 COLL VENOUS BLD VENIPUNCTURE: CPT | Performed by: HOSPITALIST

## 2018-02-26 PROCEDURE — 74011000250 HC RX REV CODE- 250: Performed by: HOSPITALIST

## 2018-02-26 PROCEDURE — 93306 TTE W/DOPPLER COMPLETE: CPT

## 2018-02-26 PROCEDURE — 65660000000 HC RM CCU STEPDOWN

## 2018-02-26 RX ORDER — DEXAMETHASONE SODIUM PHOSPHATE 4 MG/ML
6 INJECTION, SOLUTION INTRA-ARTICULAR; INTRALESIONAL; INTRAMUSCULAR; INTRAVENOUS; SOFT TISSUE EVERY 6 HOURS
Status: DISCONTINUED | OUTPATIENT
Start: 2018-02-26 | End: 2018-02-27 | Stop reason: HOSPADM

## 2018-02-26 RX ORDER — GABAPENTIN 300 MG/1
300 CAPSULE ORAL 3 TIMES DAILY
Status: DISCONTINUED | OUTPATIENT
Start: 2018-02-26 | End: 2018-02-27 | Stop reason: HOSPADM

## 2018-02-26 RX ORDER — KETOROLAC TROMETHAMINE 15 MG/ML
15 INJECTION, SOLUTION INTRAMUSCULAR; INTRAVENOUS
Status: DISCONTINUED | OUTPATIENT
Start: 2018-02-26 | End: 2018-02-27 | Stop reason: HOSPADM

## 2018-02-26 RX ORDER — KETOROLAC TROMETHAMINE 30 MG/ML
15 INJECTION, SOLUTION INTRAMUSCULAR; INTRAVENOUS ONCE
Status: COMPLETED | OUTPATIENT
Start: 2018-02-26 | End: 2018-02-26

## 2018-02-26 RX ORDER — SODIUM CHLORIDE 9 MG/ML
10 INJECTION INTRAMUSCULAR; INTRAVENOUS; SUBCUTANEOUS
Status: COMPLETED | OUTPATIENT
Start: 2018-02-26 | End: 2018-02-26

## 2018-02-26 RX ADMIN — PANTOPRAZOLE SODIUM 40 MG: 40 TABLET, DELAYED RELEASE ORAL at 11:54

## 2018-02-26 RX ADMIN — PAROXETINE 20 MG: 20 TABLET, FILM COATED ORAL at 08:46

## 2018-02-26 RX ADMIN — OXYCODONE HYDROCHLORIDE AND ACETAMINOPHEN 2 TABLET: 5; 325 TABLET ORAL at 14:53

## 2018-02-26 RX ADMIN — INFLUENZA VIRUS VACCINE 0.5 ML: 15; 15; 15; 15 SUSPENSION INTRAMUSCULAR at 11:54

## 2018-02-26 RX ADMIN — INSULIN LISPRO 2 UNITS: 100 INJECTION, SOLUTION INTRAVENOUS; SUBCUTANEOUS at 08:45

## 2018-02-26 RX ADMIN — INSULIN LISPRO 2 UNITS: 100 INJECTION, SOLUTION INTRAVENOUS; SUBCUTANEOUS at 18:24

## 2018-02-26 RX ADMIN — ATORVASTATIN CALCIUM 40 MG: 40 TABLET, FILM COATED ORAL at 21:09

## 2018-02-26 RX ADMIN — OXYCODONE HYDROCHLORIDE AND ACETAMINOPHEN 2 TABLET: 5; 325 TABLET ORAL at 08:50

## 2018-02-26 RX ADMIN — SODIUM CHLORIDE 10 ML: 9 INJECTION INTRAMUSCULAR; INTRAVENOUS; SUBCUTANEOUS at 11:08

## 2018-02-26 RX ADMIN — KETOROLAC TROMETHAMINE 15 MG: 15 INJECTION, SOLUTION INTRAMUSCULAR; INTRAVENOUS at 16:39

## 2018-02-26 RX ADMIN — GABAPENTIN 300 MG: 300 CAPSULE ORAL at 14:53

## 2018-02-26 RX ADMIN — GABAPENTIN 300 MG: 300 CAPSULE ORAL at 08:46

## 2018-02-26 RX ADMIN — OXYCODONE HYDROCHLORIDE AND ACETAMINOPHEN 2 TABLET: 5; 325 TABLET ORAL at 21:10

## 2018-02-26 RX ADMIN — MORPHINE SULFATE 2 MG: 4 INJECTION INTRAVENOUS at 06:07

## 2018-02-26 RX ADMIN — KETOROLAC TROMETHAMINE 15 MG: 15 INJECTION, SOLUTION INTRAMUSCULAR; INTRAVENOUS at 22:32

## 2018-02-26 RX ADMIN — DEXAMETHASONE SODIUM PHOSPHATE 6 MG: 4 INJECTION, SOLUTION INTRAMUSCULAR; INTRAVENOUS at 18:23

## 2018-02-26 RX ADMIN — KETOROLAC TROMETHAMINE 15 MG: 30 INJECTION, SOLUTION INTRAMUSCULAR at 02:47

## 2018-02-26 RX ADMIN — DEXAMETHASONE SODIUM PHOSPHATE 6 MG: 4 INJECTION, SOLUTION INTRAMUSCULAR; INTRAVENOUS at 11:53

## 2018-02-26 RX ADMIN — AMLODIPINE BESYLATE 5 MG: 5 TABLET ORAL at 08:46

## 2018-02-26 RX ADMIN — GABAPENTIN 300 MG: 300 CAPSULE ORAL at 21:09

## 2018-02-26 RX ADMIN — DEXAMETHASONE SODIUM PHOSPHATE 4 MG: 4 INJECTION, SOLUTION INTRAMUSCULAR; INTRAVENOUS at 05:55

## 2018-02-26 NOTE — PROGRESS NOTES
0700--Bedside shift change report given to VALERIANO Pimentel (oncoming nurse) by Macie Mei, RN (offgoing nurse). Report included the following information SBAR, Kardex, MAR, Recent Results and Cardiac Rhythm NSR.     1000--Pt requesting flu and pneumonia vaccine. Will f/u with MD regarding pna vaccine. Awaiting Urine for specimen. Pt aware. 1600--UA sent, OT recommended ice packs, provided for pt. Pt requesting toradol from MD. MD aware. Pt reports increased ROM in hands today compared to previous days. 1900--Bedside shift change report given to WILVER Booker (oncoming nurse) by Darius Green RN (offgoing nurse). Report included the following information SBAR, Kardex, MAR, Recent Results and Cardiac Rhythm NSR.

## 2018-02-26 NOTE — PROGRESS NOTES
[facility] Hospitalist Group  Progress Note    Patient: Brett Napoles Age: 61 y.o. : 1955 MR#: [id number] SSN: [id number]  Date/Time: 2018     Subjective: pt still c/o burning sensation in forearm, no more syncope. No other complaints. Morphine not helping him but Toradol did better       Assessment/Plan:   1. Syncope. Most likely looks vasovagal vs post-micturition, neg cardiac enzymes and no tele events. cardio in put noted  2. Cervical cord compression with possibility cord injury with the fall. cont Decadron and agree w gabapentin, will add toradol. d/w Spine surgery, Dr. Gab Garcia, no need for surgery. 3.  Cervical radiculopathy with severe pain and burning in his upper extremities from # 2. Will start Toradol. Will get neurology in put, d/w Dr. Amira Ventura   4. Hypertension. BP high, will resume home meds'. 5.  Dyslipidemia. Will continue statin. 6.  Hypokalemia. Better. 7.  Mild elevated CPK, possibly from fall. Trending. D/w pt and wife at bedside   D/w neuro and spine Sx       I spent 30 minutes with the patient in face-to-face consultation, of which greater than 50% was spent in counseling and coordination of care as described above.     Case discussed with:  [x]Patient  [x]Family  [x]Nursing  []Case Management  DVT Prophylaxis:  []Lovenox  []Hep SQ  [x]SCDs  []Coumadin   []On Heparin gtt    Objective:   VS:   Visit Vitals    /75 (BP 1 Location: Left arm, BP Patient Position: At rest)    Pulse 74    Temp 99 °F (37.2 °C)    Resp 20    Ht 6' (1.829 m)    Wt 124.2 kg (273 lb 14.4 oz)    SpO2 95%    BMI 37.15 kg/m2      Tmax/24hrs: Temp (24hrs), Av.5 °F (36.9 °C), Min:97.9 °F (36.6 °C), Max:99 °F (37.2 °C)  IOBRIEF  Intake/Output Summary (Last 24 hours) at 18 1618  Last data filed at 02/26/18 1431   Gross per 24 hour   Intake             1150 ml   Output              300 ml   Net              850 ml       General:  Alert, cooperative, no acute distress    Pulmonary:  CTA Bilaterally. No Wheezing/Rhonchi/Rales. Cardiovascular: Regular rate and Rhythm. GI:  Soft, Non distended, Non tender. + Bowel sounds. Extremities:  No edema, cyanosis, clubbing. Psych: Good insight. Not anxious or agitated. Neurologic: Alert and oriented X 3. Motor 5/5. paraesthesia in B/L forearm.       Medications:   Current Facility-Administered Medications   Medication Dose Route Frequency    gabapentin (NEURONTIN) capsule 300 mg  300 mg Oral TID    dexamethasone (DECADRON) 4 mg/mL injection 6 mg  6 mg IntraVENous Q6H    ketorolac (TORADOL) injection 15 mg  15 mg IntraVENous Q6H PRN    atorvastatin (LIPITOR) tablet 40 mg  40 mg Oral QHS    amLODIPine (NORVASC) tablet 5 mg  5 mg Oral DAILY    PARoxetine (PAXIL) tablet 20 mg  20 mg Oral DAILY    acetaminophen (TYLENOL) tablet 650 mg  650 mg Oral Q4H PRN    ondansetron (ZOFRAN) injection 4 mg  4 mg IntraVENous Q4H PRN    magnesium hydroxide (MILK OF MAGNESIA) 400 mg/5 mL oral suspension 30 mL  30 mL Oral DAILY PRN    pantoprazole (PROTONIX) tablet 40 mg  40 mg Oral ACB    oxyCODONE-acetaminophen (PERCOCET) 5-325 mg per tablet 1-2 Tab  1-2 Tab Oral Q6H PRN    insulin lispro (HUMALOG) injection   SubCUTAneous AC&HS    glucose chewable tablet 16 g  4 Tab Oral PRN    glucagon (GLUCAGEN) injection 1 mg  1 mg IntraMUSCular PRN    dextrose (D50W) injection syrg 12.5-25 g  25-50 mL IntraVENous PRN       Labs:    Recent Results (from the past 24 hour(s))   GLUCOSE, POC    Collection Time: 02/25/18  5:09 PM   Result Value Ref Range    Glucose (POC) 126 (H) 70 - 110 mg/dL   CARDIAC PANEL,(CK, CKMB & TROPONIN)    Collection Time: 02/25/18  5:36 PM   Result Value Ref Range     (H) 39 - 308 U/L    CK - MB 6.6 (H) <3.6 ng/ml    CK-MB Index 1.2 0.0 - 4.0 %    Troponin-I, Qt. <0.02 0.0 - 0.045 NG/ML   GLUCOSE, POC    Collection Time: 02/25/18  9:25 PM   Result Value Ref Range    Glucose (POC) 241 (H) 70 - 110 mg/dL   CARDIAC PANEL,(CK, CKMB & TROPONIN)    Collection Time: 02/25/18  9:50 PM   Result Value Ref Range     (H) 39 - 308 U/L    CK - MB 5.0 (H) <3.6 ng/ml    CK-MB Index 1.0 0.0 - 4.0 %    Troponin-I, Qt. <0.02 0.0 - 0.045 NG/ML   CARDIAC PANEL,(CK, CKMB & TROPONIN)    Collection Time: 02/26/18  2:55 AM   Result Value Ref Range     (H) 39 - 308 U/L    CK - MB 4.1 (H) <3.6 ng/ml    CK-MB Index 1.0 0.0 - 4.0 %    Troponin-I, Qt. <0.02 0.0 - 5.582 NG/ML   METABOLIC PANEL, BASIC    Collection Time: 02/26/18  3:00 AM   Result Value Ref Range    Sodium 140 136 - 145 mmol/L    Potassium 4.1 3.5 - 5.5 mmol/L    Chloride 106 100 - 108 mmol/L    CO2 18 (L) 21 - 32 mmol/L    Anion gap 16 3.0 - 18 mmol/L    Glucose 133 (H) 74 - 99 mg/dL    BUN 16 7.0 - 18 MG/DL    Creatinine 0.85 0.6 - 1.3 MG/DL    BUN/Creatinine ratio 19 12 - 20      GFR est AA >60 >60 ml/min/1.73m2    GFR est non-AA >60 >60 ml/min/1.73m2    Calcium 9.1 8.5 - 10.1 MG/DL   GLUCOSE, POC    Collection Time: 02/26/18  8:29 AM   Result Value Ref Range    Glucose (POC) 169 (H) 70 - 110 mg/dL   URINALYSIS W/ RFLX MICROSCOPIC    Collection Time: 02/26/18 11:50 AM   Result Value Ref Range    Color YELLOW      Appearance CLOUDY      Specific gravity 1.028 1.005 - 1.030      pH (UA) 5.5 5.0 - 8.0      Protein TRACE (A) NEG mg/dL    Glucose NEGATIVE  NEG mg/dL    Ketone TRACE (A) NEG mg/dL    Bilirubin NEGATIVE  NEG      Blood NEGATIVE  NEG      Urobilinogen 1.0 0.2 - 1.0 EU/dL    Nitrites NEGATIVE  NEG      Leukocyte Esterase NEGATIVE  NEG     URINE MICROSCOPIC ONLY    Collection Time: 02/26/18 11:50 AM   Result Value Ref Range    WBC 0 to 2 0 - 4 /hpf    Epithelial cells 1+ 0 - 5 /lpf    Bacteria FEW (A) NEG /hpf    Mucus 2+ (A) NEG /lpf   GLUCOSE, POC    Collection Time: 02/26/18 11:51 AM   Result Value Ref Range    Glucose (POC) 152 (H) 70 - 110 mg/dL   GLUCOSE, POC    Collection Time: 02/26/18  3:12 PM   Result Value Ref Range    Glucose (POC) 171 (H) 70 - 110 mg/dL Signed By: Allan Raza MD     February 26, 2018

## 2018-02-26 NOTE — CONSULTS
Consult    Patient: Darrell Lee MRN: [id number]  SSN: [id number]    YOB: 1955  Age: 61 y.o. Sex: male      Subjective:      Darrell Lee is a 61 y.o. male who is being seen for weakness and hyperthesia in arms following syncopal episode. patient with LOC 1-2 hours awoke on floor of bathroom arms folded underneath unable to move them. Arms hypersensitive and painful. No headache, head bruise, neck pain, tenderness. Patient with pain in lower arms bilaterally somewhat worse on left than right from elbow on extensor surface to radial three digits sparing jolanta and ulnar digits. Weak intrinsics. Hypersensitive. No muscle edema/swelling. Nl exam elsewhere. No long tract signs. Sl increase patella reflex. MRI of c spine with stenosis chronic c5/6 secondary to disc/ spur unchanged since 2012. MRI read as possible sprain. No cord edema gliosis. Ct shows possible OPLL. - no fx. Past Medical History:   Diagnosis Date    Anxiety     Arthritis     between C4 and C5    Basal cell carcinoma     s/p resection    Carotid duplex 02/04/2013    No stenosis >49% bilaterally.  Carpal tunnel syndrome     Diabetes ([facility] [address] 05/2016    fbs>125 x2    Dyslipidemia     Fatty liver on Us 1/02     Fib-4 was 0.43 from 9/14    GERD (gastroesophageal reflux disease)     History of myocardial perfusion scan 01/21/2013    Mild fixed basal inferior, mid inferior, inferoapical defect likely artifact, but inrafction in RCA not entirely excluded. No ischemia. EF 53%. No WMA. Neg EKG on max EST. Ex time 8 min 15 sec.  Hypertension     Hypovitaminosis D     Low serum testosterone level 2012    negative w/u Dr. Giancarlo Chavez Morbid obesity ([facility] [address]     peak weight 286 lbs, bmi 38.8 from 7/11; intol qsymia, wellbutrin    Normal stress echo 03/31/2015    Normal study after maximal exercise. No symptoms. EF 60%. Ex time 9 min.       Prediabetes     Rhinophyma     S/P cardiac cath 01/22/2002    Patent coronary arteries. LVEDP 12. EF 60%.  Sinus bradycardia     Asymptomatic in 2011    Sleep apnea mild Dr. Stormy Lagunas 2011     AHI 2.3, minimum desats 83% 2011 Dr Maria E Degroot VSD (ventricular septal defect) 2011    Noted on echo, with flow noted between the aorta near the aortic valve and the RV outflow tract/proximal main PA. This can be consistent with a small outlet VSD    Zoster     3 prior episodes in right CN5 distribution? suspect hsv instead     Past Surgical History:   Procedure Laterality Date    HX COLONOSCOPY      Dr. Elijah Clemente 2007 negative; 8/22/17 divertics    HX HEART CATHETERIZATION  1/22/02    The right coronary artery is large and dominant. It is widely patent. The left main coronary artery is widely patent. The left anterior descending coronary artery is widely patent. The circumflex coronary artery is widely patent. LVEDP is 12 mmHg. Overall left ventricular systolic function is within normal limits with an EF of 60%. No significant MR or AS is noted.    [address]    surgery for deviated septum    HX ORTHOPAEDIC Right 1976    knee cartilage removed right knee    HX ORTHOPAEDIC Left 2012    CTR    HX ORTHOPAEDIC      Bilateral hip replacement Dr Samuel Inch Right     trigger release 4th finger hand    HX VASECTOMY      VASCULAR SURGERY PROCEDURE UNLIST  2/13    renal artery dopplers neg    VASECTOMY        Family History   Problem Relation Age of Onset    Diabetes Mother     Hypertension Father     Cancer Father      bladder     Social History   Substance Use Topics    Smoking status: Former Smoker     Packs/day: 1.00     Years: 10.00     Quit date: 11/26/1987    Smokeless tobacco: Never Used    Alcohol use 1.0 oz/week     2 Standard drinks or equivalent per week      Comment: social weekly      Current Facility-Administered Medications   Medication Dose Route Frequency Provider Last Rate Last Dose    gabapentin (NEURONTIN) capsule 300 mg  300 mg Oral TID Koby Escobar Karan Nunez MD        dexamethasone (DECADRON) 4 mg/mL injection 6 mg  6 mg IntraVENous Danielle Santiago MD        atorvastatin (LIPITOR) tablet 40 mg  40 mg Oral QHS Kolby Garibay MD   40 mg at 02/25/18 2200    amLODIPine (NORVASC) tablet 5 mg  5 mg Oral DAILY Kolby Garibay MD        PARoxetine (PAXIL) tablet 20 mg  20 mg Oral DAILY Kolby Garibay MD        acetaminophen (TYLENOL) tablet 650 mg  650 mg Oral Q4H PRN Kolby Garibay MD        ondansetron (ZOFRAN) injection 4 mg  4 mg IntraVENous Q4H PRN Kolby Garibay MD        magnesium hydroxide (MILK OF MAGNESIA) 400 mg/5 mL oral suspension 30 mL  30 mL Oral DAILY PRN Kolby Garibay MD        morphine 2 mg  2 mg IntraVENous Q4H PRN Kolby Garibay MD   2 mg at 02/26/18 7200    pantoprazole (PROTONIX) tablet 40 mg  40 mg Oral ACB Kolby Garibay MD   40 mg at 02/25/18 1719    oxyCODONE-acetaminophen (PERCOCET) 5-325 mg per tablet 1-2 Tab  1-2 Tab Oral Q6H PRN Kolby Garibay MD   2 Tab at 02/25/18 2120    insulin lispro (HUMALOG) injection   SubCUTAneous AC&HS Kolby Garibay MD   4 Units at 02/25/18 2126    glucose chewable tablet 16 g  4 Tab Oral PRN Kolby Garibay MD        glucagon (GLUCAGEN) injection 1 mg  1 mg IntraMUSCular PRN Kolby Garibay MD        dextrose (D50W) injection syrg 12.5-25 g  25-50 mL IntraVENous PRN Kolby Garibay MD            Allergies   Allergen Reactions    Qsymia [Phentermine-Topiramate] Other (comments)     Dulled his thinking    Wellbutrin [Bupropion Hcl] Other (comments)     Patient said it made him feel funny       Review of Systems:  A comprehensive review of systems was negative except for that written in the History of Present Illness.     Objective:     Vitals:    02/25/18 2055 02/25/18 2345 02/26/18 0358 02/26/18 0558   BP: 153/78 (!) 152/92 144/83    Pulse: 72 71 68    Resp: 18 18 18    Temp: 98.6 °F (37 °C) 98.5 °F (36.9 °C) 97.9 °F (36.6 °C)    SpO2: 94% 94% 95%    Weight:    273 lb 14.4 oz (124.2 kg)   Height:            Physical Exam:  General:  Alert, cooperative, no distress, appears stated age. Eyes:  Conjunctivae/corneas clear. PERRL, EOMs intact. Fundi benign   Ears:  Normal TMs and external ear canals both ears. Nose: Nares normal. Septum midline. Mucosa normal. No drainage or sinus tenderness. Mouth/Throat: Lips, mucosa, and tongue normal. Teeth and gums normal.   Neck: Supple, symmetrical, trachea midline, no adenopathy, thyroid: no enlargment/tenderness/nodules, no carotid bruit and no JVD. Back:   Symmetric, no curvature. ROM normal. No CVA tenderness. Extremities: Extremities normal, atraumatic, no cyanosis or edema. Pulses: 2+ and symmetric all extremities. Skin: Skin color, texture, turgor normal. No rashes or lesions   Lymph nodes: Cervical, supraclavicular, and axillary nodes normal.   Neurologic: CNII-XII intact. Yolonda Schilder as above  Assessment:       Unusual picture. Generally cord contusion/sprain would have at least some neck pain- there is none. Also no pain above elbow. Picture in arms mix of radicular and local nerve/ muscle contusion pain: radial nerve contusion bilaterally in forearm. .  May also be explained by central contusion at c5/6 level- but no edema seen on MRI, no neck pain or head bruise. Plan:     Have asked cardiology to join in evaluation of syncope- they follow him as out patient    Will hold off from surgery at this time- decompression of his stenosis at c5/6 is not without risk and this is not clearly the source of his symptoms at this time. Have increased steroid dose for 24 hours, and with start of gabapentin.     Observe    Signed By: Brigido Lemus MD     February 26, 2018

## 2018-02-26 NOTE — ROUTINE PROCESS
Patient still complaining a lot of pain to bilateral hands , percocet and morphine ineffective, Dr Clarke Organ notified new order for order for toradol received.

## 2018-02-26 NOTE — PROGRESS NOTES
Complete 2D echocardiogram study was performed on patient with bubble. Report to follow. Patient went back to room with armband still in place.

## 2018-02-26 NOTE — ROUTINE PROCESS
Bedside and Verbal shift change report given to Hammerless GTxcel (oncoming nurse) by Alejandro Guajardo (offgoing nurse). Report included the following information Kardex, Intake/Output and MAR.

## 2018-02-26 NOTE — CONSULTS
[facility] - Consult Note    Consultation request by Melissa Lewis MD for advice/opinion related to evaluating Sprain of ligaments of cervical spine  Syncope  Syncope    Date of  Admission: 2/25/2018  4:46 AM   Primary Care Physician:  Norm Zhang MD     Assessment:     Patient Active Problem List   Diagnosis Code    Chronic LE edema R60.0    Hypovitaminosis D Dr. Eileen Perez E55.9    Dyslipidemia E78.5    Sleep apnea Dr. Tayo Pabon 2011 AHI 2.8 G47.30    Osteoarthritis, hip, bilateral M16.0    Gastroesophageal reflux disease without esophagitis Dr Soraida Levi 2002 K21.9    Obesity (BMI 30-39. 9) E66.9    Controlled type 2 diabetes mellitus E11.9    Primary hypertension I10    Diverticulosis 8/17 Dr Soraida Levi K57.30    Syncope R55    Sprain of ligaments of cervical spine S13. 4XXA     -Syncope- suspect the patient had a vasovagal episode during his bowel movement. He has been asymptomatic since and without any CV complaints. He felt normal prior to event.  -Do not suspect ACS with normal EKG and negative biomarkers   -Nerve pain to bilateral median nerve distribution to arms. Seen by neurosurgery, and no evidence of cord contusion. Being monitored by Dr. Cris Ibarra. -HTN- stable  -Dyslipidemia- on statins  -DM Type II    Primary cardiologist Dr. East Ace:     Stable  Patient with prior normal cath in 2005, and normal stress echo in 2015. Will obtain echo for completeness. If negative do not suspect any other CV workup. Continue on his normal outpatient meds. Will follow. History of Present Illness: This is a 61 y.o. male admitted for Sprain of ligaments of cervical spine  Syncope  Syncope. Patient complains of:  Patient was brought to the ER with syncope after the patient states that he was sitting for a bowel movement and awoke roughly 2 hours later.   It was unwitnessed, but the patient states that he was feeling an urge to have a bowel movement, went to sit on the toilet, and felt a sensation prior to having the event. He apparently hit his head on the door with the fall, but denied any headaches. He is having significant pain to his bilateral lower arms with the thumb, index and middle fingers having pain. There is no pain to the upper arms. Cardiac risk factors: dyslipidemia, male gender, hypertension      Review of Symptoms:  Except as stated above include:  Constitutional:  negative  Respiratory:  negative  Cardiovascular:  negative  Gastrointestinal: negative  Genitourinary:  negative  Musculoskeletal:  Negative  Neurological:  Negative  Dermatological:  Negative  Endocrinological: Negative  Psychological:  Negative    A comprehensive review of systems was negative except for that written in the HPI. Past Medical History:     Past Medical History:   Diagnosis Date    Anxiety     Arthritis     between C4 and C5    Basal cell carcinoma     s/p resection    Carotid duplex 02/04/2013    No stenosis >49% bilaterally.  Carpal tunnel syndrome     Diabetes ([facility] [address] 05/2016    fbs>125 x2    Dyslipidemia     Fatty liver on Us 1/02     Fib-4 was 0.43 from 9/14    GERD (gastroesophageal reflux disease)     History of myocardial perfusion scan 01/21/2013    Mild fixed basal inferior, mid inferior, inferoapical defect likely artifact, but inrafction in RCA not entirely excluded. No ischemia. EF 53%. No WMA. Neg EKG on max EST. Ex time 8 min 15 sec.  Hypertension     Hypovitaminosis D     Low serum testosterone level 2012    negative w/u Dr. Bettye Zavala Morbid obesity ([facility] [address]     peak weight 286 lbs, bmi 38.8 from 7/11; intol qsymia, wellbutrin    Normal stress echo 03/31/2015    Normal study after maximal exercise. No symptoms. EF 60%. Ex time 9 min.  Prediabetes     Rhinophyma     S/P cardiac cath 01/22/2002    Patent coronary arteries. LVEDP 12. EF 60%.       Sinus bradycardia     Asymptomatic in 2011    Sleep apnea mild Dr. Sara De Santiago 2011 AHI 2.3, minimum desats 83% 2011 Dr Urvashi Crain VSD (ventricular septal defect) 2011    Noted on echo, with flow noted between the aorta near the aortic valve and the RV outflow tract/proximal main PA. This can be consistent with a small outlet VSD    Zoster     3 prior episodes in right CN5 distribution? suspect hsv instead      Stress echo 2015  Summary:  Stress results: Duration of exercise was 9 min. Maximal work rate was 10.4  METs. Target heart rate was achieved. Maximal systolic blood pressure  during stress was 144 mmHg. There was normal resting blood pressure with  an appropriate response to stress. There was no chest pain during stress. ECG conclusions: The stress ECG was negative for ischemia. Baseline: Estimated left ventricular ejection fraction was 60 % . Indications: SOB, elevated BP    History: The patient is a 61year old  male. Chest pain status:  no chest pain. Other symptoms: dyspnea. Coronary artery disease risk  factors: family history of coronary artery disease. Medications:  amlodipine, bp meds    Physical Exam: Baseline physical exam screening: normal.    Rest ECG: Normal sinus rhythm. Normal baseline ECG. Procedure: The procedure was explained to the patient and informed consent  was obtained. Treadmill exercise testing was performed, using the Kenn  protocol. Stress and rest echocardiographic evaluation with 2D imaging and  Definity intravenous contrast was performed from multiple acoustic windows  for evaluation of ventricular function. The heart rate was 64 at the start  of the study. Systolic blood pressure was 134 mmHg at the start of the  study. Diastolic blood pressure was 80 mmHg at the start of the study. Stress 2D echocardiographic results  Baseline: Left ventricular size was normal. Overall left ventricular  systolic function was normal. Estimated left ventricular ejection fraction  was 60 % . Peak stress:  There was an appropriate reduction in left ventricular size. There was an appropriate augmentation in LV function. Social History:     Social History     Social History    Marital status:      Spouse name: N/A    Number of children: 1    Years of education: N/A     Occupational History    developer      Social History Main Topics    Smoking status: Former Smoker     Packs/day: 1.00     Years: 10.00     Quit date: 11/26/1987    Smokeless tobacco: Never Used    Alcohol use 1.0 oz/week     2 Standard drinks or equivalent per week      Comment: social weekly    Drug use: No    Sexual activity: Yes     Other Topics Concern    Not on file     Social History Narrative        Family History:     Family History   Problem Relation Age of Onset    Diabetes Mother     Hypertension Father     Cancer Father      bladder        Medications:      Allergies   Allergen Reactions    Qsymia [Phentermine-Topiramate] Other (comments)     Dulled his thinking    Wellbutrin [Bupropion Hcl] Other (comments)     Patient said it made him feel funny        Current Facility-Administered Medications   Medication Dose Route Frequency    gabapentin (NEURONTIN) capsule 300 mg  300 mg Oral TID    dexamethasone (DECADRON) 4 mg/mL injection 6 mg  6 mg IntraVENous Q6H    atorvastatin (LIPITOR) tablet 40 mg  40 mg Oral QHS    amLODIPine (NORVASC) tablet 5 mg  5 mg Oral DAILY    PARoxetine (PAXIL) tablet 20 mg  20 mg Oral DAILY    acetaminophen (TYLENOL) tablet 650 mg  650 mg Oral Q4H PRN    ondansetron (ZOFRAN) injection 4 mg  4 mg IntraVENous Q4H PRN    magnesium hydroxide (MILK OF MAGNESIA) 400 mg/5 mL oral suspension 30 mL  30 mL Oral DAILY PRN    morphine 2 mg  2 mg IntraVENous Q4H PRN    pantoprazole (PROTONIX) tablet 40 mg  40 mg Oral ACB    oxyCODONE-acetaminophen (PERCOCET) 5-325 mg per tablet 1-2 Tab  1-2 Tab Oral Q6H PRN    insulin lispro (HUMALOG) injection   SubCUTAneous AC&HS    glucose chewable tablet 16 g  4 Tab Oral PRN    glucagon (GLUCAGEN) injection 1 mg  1 mg IntraMUSCular PRN    dextrose (D50W) injection syrg 12.5-25 g  25-50 mL IntraVENous PRN         Physical Exam:     Visit Vitals    /83 (BP 1 Location: Left arm)    Pulse 68    Temp 97.9 °F (36.6 °C)    Resp 18    Ht 6' (1.829 m)    Wt 124.2 kg (273 lb 14.4 oz)    SpO2 95%    BMI 37.15 kg/m2     BP Readings from Last 3 Encounters:   02/26/18 144/83   01/16/18 152/86   03/29/17 124/84     Pulse Readings from Last 3 Encounters:   02/26/18 68   01/16/18 72   03/29/17 72     Wt Readings from Last 3 Encounters:   02/26/18 124.2 kg (273 lb 14.4 oz)   01/16/18 126.6 kg (279 lb)   03/29/17 119.7 kg (264 lb)       General:  alert, cooperative, no distress, appears stated age  Neck:  nontender, no carotid bruit, no JVD  Lungs:  clear to auscultation bilaterally  Heart:  regular rate and rhythm, S1, S2 normal, no murmur, click, rub or gallop  Abdomen:  abdomen is soft without significant tenderness, masses, organomegaly or guarding  Extremities:  extremities normal, atraumatic, no cyanosis or edema  Skin: Warm and dry. no hyperpigmentation, vitiligo, or suspicious lesions, abrasion to upper forehead  Neuro: alert, oriented x3, affect appropriate, no focal neurological deficits, moves all extremities well, no involuntary movements, pain to his bilateral median nerve distribution to both lower arms, normal function to upper arms.   Psych: non focal     Data Review:     Recent Labs      02/25/18   0500   WBC  10.4   HGB  14.6   HCT  40.7   PLT  256     Recent Labs      02/25/18   0500   NA  137   K  3.3*   CL  103   CO2  22   GLU  180*   BUN  15   CREA  0.95   CA  8.8   ALB  3.8   SGOT  31   ALT  49       Results for orders placed or performed during the hospital encounter of 02/25/18   EKG, 12 LEAD, INITIAL   Result Value Ref Range    Ventricular Rate 57 BPM    Atrial Rate 57 BPM    P-R Interval 172 ms    QRS Duration 104 ms    Q-T Interval 488 ms    QTC Calculation (Bezet) 474 ms Calculated P Axis 65 degrees    Calculated R Axis 46 degrees    Calculated T Axis 54 degrees    Diagnosis       Sinus bradycardia  Otherwise normal ECG  When compared with ECG of 25-MAR-2015 18:49,  No significant change was found  Confirmed by Sharon Cardenas ([id number]) on 2/25/2018 9:42:22 AM     Results for orders placed or performed in visit on 01/16/18   AMB POC EKG ROUTINE W/ 12 LEADS, INTER & REP    Narrative    See note       All Cardiac Markers in the last 24 hours:    Lab Results   Component Value Date/Time     (H) 02/26/2018 02:55 AM     (H) 02/25/2018 09:50 PM     (H) 02/25/2018 05:36 PM    CKMB 4.1 (H) 02/26/2018 02:55 AM    CKMB 5.0 (H) 02/25/2018 09:50 PM    CKMB 6.6 (H) 02/25/2018 05:36 PM    CKND1 1.0 02/26/2018 02:55 AM    CKND1 1.0 02/25/2018 09:50 PM    CKND1 1.2 02/25/2018 05:36 PM    TROIQ <0.02 02/26/2018 02:55 AM    TROIQ <0.02 02/25/2018 09:50 PM    TROIQ <0.02 02/25/2018 05:36 PM       Last Lipid:    Lab Results   Component Value Date/Time    Cholesterol, total 113 09/19/2016 10:35 PM    HDL Cholesterol 36 (L) 09/19/2016 10:35 PM    LDL, calculated 60 09/19/2016 10:35 PM    Triglyceride 86 09/19/2016 10:35 PM    CHOL/HDL Ratio 5.3 (H) 12/29/2009 07:49 AM       Signed By: SUSANA Morris     February 26, 2018

## 2018-02-26 NOTE — PROGRESS NOTES
Problem: Mobility Impaired (Adult and Pediatric)  Goal: *Acute Goals and Plan of Care (Insert Text)  physical Therapy EVALUATION and Discharge    Patient: Luis Alberto Ruiz (13 y.o. male)  Date: 2/26/2018  Primary Diagnosis: Sprain of ligaments of cervical spine  Syncope  Syncope        Precautions:   Fall    ASSESSMENT AND RECOMMENDATIONS:  Based on the objective data described below, the patient presents with c/o bilateral pain and hypersensitivity in both hands thumb, index and middle fingers. Patient denies neck pain. He describes it as below the elbow and to the hands. He is having difficulty making a fist and moving his fingers due to the pain which he describes as \"electric\". Swelling noted L elbow lateral aspect as well. Patient was able to slightly move his fingers however unable to place weight through his hands to support sit to stand transfers. Patient encouraged to perform active movement of both hands as tolerated. Pt. Would benefit from OT to f/u and make recommendations. Pt. Required sup with transfers due to slight difficult initially to stand. Pt. Was able to walk without difficulty or LOB. Skilled physical therapy is not indicated at this time. Discharge Recommendations: Outpatient- Hand clinic  Further Equipment Recommendations for Discharge: N/A      SUBJECTIVE:   Patient stated i am fine.     OBJECTIVE DATA SUMMARY:     Past Medical History:   Diagnosis Date    Anxiety     Arthritis     between C4 and C5    Basal cell carcinoma     s/p resection    Carotid duplex 02/04/2013    No stenosis >49% bilaterally.  Carpal tunnel syndrome     Diabetes ([facility] [address] 05/2016    fbs>125 x2    Dyslipidemia     Fatty liver on Us 1/02     Fib-4 was 0.43 from 9/14    GERD (gastroesophageal reflux disease)     History of myocardial perfusion scan 01/21/2013    Mild fixed basal inferior, mid inferior, inferoapical defect likely artifact, but inrafction in RCA not entirely excluded. No ischemia.   EF 53%.  No WMA. Neg EKG on max EST. Ex time 8 min 15 sec.  Hypertension     Hypovitaminosis D     Low serum testosterone level 2012    negative w/u Dr. Alirio Lemons Morbid obesity ([address]     peak weight 286 lbs, bmi 38.8 from 7/11; intol qsymia, wellbutrin    Normal stress echo 03/31/2015    Normal study after maximal exercise. No symptoms. EF 60%. Ex time 9 min.  Prediabetes     Rhinophyma     S/P cardiac cath 01/22/2002    Patent coronary arteries. LVEDP 12. EF 60%.  Sinus bradycardia     Asymptomatic in 2011    Sleep apnea mild Dr. Stormy Lagunas 2011     AHI 2.3, minimum desats 83% 2011 Dr Maria E Degroot VSD (ventricular septal defect) 2011    Noted on echo, with flow noted between the aorta near the aortic valve and the RV outflow tract/proximal main PA. This can be consistent with a small outlet VSD    Zoster     3 prior episodes in right CN5 distribution? suspect hsv instead     Past Surgical History:   Procedure Laterality Date    HX COLONOSCOPY      Dr. Elijah Clemente 2007 negative; 8/22/17 divertics    HX HEART CATHETERIZATION  1/22/02    The right coronary artery is large and dominant. It is widely patent. The left main coronary artery is widely patent. The left anterior descending coronary artery is widely patent. The circumflex coronary artery is widely patent. LVEDP is 12 mmHg. Overall left ventricular systolic function is within normal limits with an EF of 60%. No significant MR or AS is noted.    [address]    surgery for deviated septum    HX ORTHOPAEDIC Right 1976    knee cartilage removed right knee    HX ORTHOPAEDIC Left 2012    CTR    HX ORTHOPAEDIC      Bilateral hip replacement Dr Samuel Inch Right     trigger release 4th finger hand    HX VASECTOMY      VASCULAR SURGERY PROCEDURE UNLIST  2/13    renal artery dopplers neg    VASECTOMY       Barriers to Learning/Limitations: None  Compensate with: visual, verbal, tactile, kinesthetic cues/model  GCODES(GP):Mobility [id number] Current  CI= 1-19%  D/C  CI= 1-19%. The severity rating is based on the Other based on functional testing and ADL score  Prior Level of Function/Home Situation: pt. Is indep/works  Home Situation  Home Environment: Private residence  # Steps to Enter: 7  Rails to Enter: Yes  Hand Rails : Bilateral  One/Two Story Residence: Two story, live on 1st floor  # of Interior Steps: 2  Height of Each Step (in): 15 inches  Interior Rails: Left  Lift Chair Available: No  Living Alone: No  Support Systems: Spouse/Significant Other/Partner  Patient Expects to be Discharged to[de-identified] Private residence  Current DME Used/Available at Home: None  Critical Behavior:  Neurologic State: Alert  Orientation Level: Oriented X4  Cognition: Appropriate decision making     Psychosocial  Patient Behaviors: Calm; Cooperative  Purposeful Interaction: Yes  Pt Identified Daily Priority: Clinical issues (comment)  Caritas Process: Establish trust  Caring Interventions: Reassure; Therapeutic modalities  Reassure: Informing  Therapeutic Modalities: Intentional therapeutic touch                 Strength:    Strength: Within functional limits   Tone & Sensation:   Tone: Normal      Sensation: Impaired (una hands c/o hypersensitivity, pain) thumb, index and middle fingers B. Rosas and dorsal aspect of hand     Range Of Motion:  AROM: Within functional limits   PROM: Within functional limits   Functional Mobility:  Bed Mobility:  Rolling: Independent  Supine to Sit: Independent; Additional time     Scooting: Independent  Transfers:  Sit to Stand: Supervision (for safety as pt. is unable to push up with either hands)           Bed to Chair: Stand-by assistance        Balance:   Sitting: Intact  Standing: Intact  Ambulation/Gait Training:  Distance (ft): 275 Feet (ft)     Ambulation - Level of Assistance: Setup     Gait Description (WDL): Within defined limits    Pain:  Pain Scale 1: Numeric (0 - 10)  Pain Intensity 1: 8  Pain Location 1: Hand  Pain Orientation 1: Left;Right  Pain Description 1: Burning;Constant; Hypersensitivity  Pain Intervention(s) 1: Medication (see MAR)  Activity Tolerance:   Fair+  Please refer to the flowsheet for vital signs taken during this treatment. After treatment:   []         Patient left in no apparent distress sitting up in chair  [x]         Patient left in no apparent distress in bed  [x]         Call bell left within reach  []         Nursing notified  []         Caregiver present  []         Bed alarm activated    COMMUNICATION/EDUCATION:   [x]         Fall prevention education was provided and the patient/caregiver indicated understanding. [x]         Patient/family have participated as able in goal setting and plan of care. []         Patient/family agree to work toward stated goals and plan of care. []         Patient understands intent and goals of therapy, but is neutral about his/her participation. []         Patient is unable to participate in goal setting and plan of care.     Thank you for this referral.  Felipe Tam, PT   Time Calculation: 23 mins

## 2018-02-26 NOTE — PROGRESS NOTES
Care Management Interventions  PCP Verified by CM: Yes (DR. JUAN HEDRICK)  Palliative Care Criteria Met (RRAT>21 & CHF Dx)?: No  Mode of Transport at Discharge: Other (see comment) (FAMILY WILL TRANSPORT)  Transition of Care Consult (CM Consult): Discharge Planning, Home Health  Physical Therapy Consult: Yes  Occupational Therapy Consult: Yes  Current Support Network: Lives with Spouse, Own Home, Family Lives Nearby (PT'S WIFE AT [place] [id number])  Confirm Follow Up Transport: Family  Plan discussed with Pt/Family/Caregiver: Yes (PT PLANS TO [facility])  [address] Provided?: No  Discharge Location  Discharge Placement: Home with home health  Pt is being recommended for hand rehab through home health services.

## 2018-02-26 NOTE — PROGRESS NOTES
Problem: Self Care Deficits Care Plan (Adult)  Goal: *Acute Goals and Plan of Care (Insert Text)  Occupational Therapy Goals  Initiated 2/26/2018 within 7 day(s). 1.  Patient will perform self-feeding with modified independence   2. Patient will perform lower body dressing with supervision/set-up. 3.  Patient will perform toileting with supervision/set-up. 4.  Patient will participate in gentle AROM upper extremity therapeutic exercise/activities with supervision/set-up in preparation for self care tasks/  Outcome: Progressing Towards Goal  Occupational Therapy EVALUATION    Patient: Roman Goncalves (21 y.o. male)  Date: 2/26/2018  Primary Diagnosis: Sprain of ligaments of cervical spine  Syncope  Syncope         Precautions:  Fall    ASSESSMENT :  Based on the objective data described below, the patient was in bed upon arrival. Patient was able to perform bed mobility and all functional mobility with supervision with no AD. Patient noted to use his elbows to push himself up on EOB, secondary to pain in bilateral hands. Patient has WFL BUE AROM of shoulders/elbows/wrists without pain, with decreased bilateral /AROM. Patient noted to have slight swelling/edema on lateral side of bilateral elbows with sensitivity noted. Patient educated to keep UE's elevated and to not place pressure through bilateral elbows to help decrease the swelling; patient reported understanding. Patient noted to have hypersensitivity and pain located on dorsal side of forearm/digits 2-3 and on ventral side of digits 2-3 and palm. Patient reported the sensitivity has gotten better since he has been to the hospital (no more sensitivity/pain noted in thumbs). Patient reported he self-fed himself this morning, but he used his digits 4/5 on R hand. Patient educated on and issued built up handles for self-feeding tasks, secondary to decreased AROM of digits/.  Patient was able to simulate self-feeding tasks with SBA/CGA with set-up with AE. Educated patient on gentle AROM of digits/thumb opposition within pain tolerance. Patient left comfortable in no distress in bed with UE's elevated on pillows. Patient will benefit from skilled intervention to address the above impairments. Patients rehabilitation potential is considered to be Good  Factors which may influence rehabilitation potential include:   []             None noted  []             Mental ability/status  [x]             Medical condition  []             Home/family situation and support systems  []             Safety awareness  []             Pain tolerance/management  []             Other:      PLAN :  Recommendations and Planned Interventions:  [x]               Self Care Training                  [x]        Therapeutic Activities  []               Functional Mobility Training    []        Cognitive Retraining  [x]               Therapeutic Exercises           []        Endurance Activities  []               Balance Training                   []        Neuromuscular Re-Education  []               Visual/Perceptual Training     []   Home Safety Training  [x]               Patient Education                 []        Family Training/Education  []               Other (comment):    Frequency/Duration: Patient will be followed by occupational therapy 1-2 times per day/4-7 days per week to address goals. Discharge Recommendations: Outpatient hand therapy/OT  Further Equipment Recommendations for Discharge: N/A     Barriers to Learning/Limitations: yes;  physical  Compensate with: visual, verbal, tactile, kinesthetic cues/model     PATIENT COMPLEXITY      Eval Complexity: History: LOW Complexity : Brief history review ; Examination: LOW Complexity : 1-3 performance deficits relating to physical, cognitive , or psychosocial skils that result in activity limitations and / or participation restrictions ;  Decision Making:LOW Complexity : No comorbidities that affect functional and no verbal or physical assistance needed to complete eval tasks  Assessment: Low Complexity     G-CODES:     Self Care  Current  CJ= 20-39%   Goal  CI= 1-19%. The severity rating is based on the Level of Assistance required for Functional Mobility and ADLs. SUBJECTIVE:   Patient stated I have a lot of sensitivity, it's the same on both arms.     OBJECTIVE DATA SUMMARY:     Past Medical History:   Diagnosis Date    Anxiety     Arthritis     between C4 and C5    Basal cell carcinoma     s/p resection    Carotid duplex 02/04/2013    No stenosis >49% bilaterally.  Carpal tunnel syndrome     Diabetes ([facility] [address] 05/2016    fbs>125 x2    Dyslipidemia     Fatty liver on Us 1/02     Fib-4 was 0.43 from 9/14    GERD (gastroesophageal reflux disease)     History of myocardial perfusion scan 01/21/2013    Mild fixed basal inferior, mid inferior, inferoapical defect likely artifact, but inrafction in RCA not entirely excluded. No ischemia. EF 53%. No WMA. Neg EKG on max EST. Ex time 8 min 15 sec.  Hypertension     Hypovitaminosis D     Low serum testosterone level 2012    negative w/u Dr. Giancarlo Chavez Morbid obesity ([facility] [address]     peak weight 286 lbs, bmi 38.8 from 7/11; intol qsymia, wellbutrin    Normal stress echo 03/31/2015    Normal study after maximal exercise. No symptoms. EF 60%. Ex time 9 min.  Prediabetes     Rhinophyma     S/P cardiac cath 01/22/2002    Patent coronary arteries. LVEDP 12. EF 60%.  Sinus bradycardia     Asymptomatic in 2011    Sleep apnea mild Dr. Guillermo Mesa 2011     AHI 2.3, minimum desats 83% 2011 Dr Jeannette Gonsalves VSD (ventricular septal defect) 2011    Noted on echo, with flow noted between the aorta near the aortic valve and the RV outflow tract/proximal main PA.  This can be consistent with a small outlet VSD    Zoster     3 prior episodes in right CN5 distribution? suspect hsv instead     Past Surgical History:   Procedure Laterality Date    HX COLONOSCOPY Dr. Andrez Bullock 2007 negative; 8/22/17 divertics    HX HEART CATHETERIZATION  1/22/02    The right coronary artery is large and dominant. It is widely patent. The left main coronary artery is widely patent. The left anterior descending coronary artery is widely patent. The circumflex coronary artery is widely patent. LVEDP is 12 mmHg. Overall left ventricular systolic function is within normal limits with an EF of 60%. No significant MR or AS is noted. [address]    surgery for deviated septum    HX ORTHOPAEDIC Right 1976    knee cartilage removed right knee    HX ORTHOPAEDIC Left 2012    CTR    HX ORTHOPAEDIC      Bilateral hip replacement Dr Minerva Michael Right     trigger release 4th finger hand    HX VASECTOMY      VASCULAR SURGERY PROCEDURE UNLIST  2/13    renal artery dopplers neg    VASECTOMY       Prior Level of Function/Home Situation: Patient reported he was independent in basic self care tasks and functional mobility PTA.   Home Situation  Home Environment: Private residence  # Steps to Enter: 7  Rails to Enter: Yes  Hand Rails : Bilateral  One/Two Story Residence: Two story, live on 1st floor  # of Interior Steps: 2  Height of Each Step (in): 15 inches  Interior Rails: Left  Lift Chair Available: No  Living Alone: No  Support Systems: Spouse/Significant Other/Partner  Patient Expects to be Discharged to[de-identified] Private residence  Current DME Used/Available at Home: None  [x]  Right hand dominant   []  Left hand dominant  Cognitive/Behavioral Status:  Neurologic State: Alert  Orientation Level: Oriented X4  Cognition: Follows commands    Skin: No skin changes noted    Edema: Slight swelling noted on bilateral lateral elbow/forearm with sensitivity noted, patient reported it feels like a \"bruise\", although no bruise noted    Vision/Perceptual:    Acuity: Within Defined Limits      Coordination:  Coordination: Within functional limits (BUEs)       Balance:  Sitting: Intact  Standing: Intact; Without support    Strength:  Bilateral shoulders WFL, decreased bilateral  strength    Tone & Sensation:  Tone: Normal (BUEs)  Sensation: no numbness noted; hypersensitivity and pain located on dorsal side of forearm and digtis 2-3 and on ventral side of digits 2-3 and palm     Range of Motion:   WFL AROM bilateral shoulders, elbows, wrist without pain  Decreased AROM of bilateral  (not able to make full  on either hand-limitations noted in AROM of digits 2-3 on bilateral hands)    Functional Mobility and Transfers for ADLs:  Bed Mobility:  Rolling: Independent  Supine to Sit: Modified independent  Sit to Supine: Modified independent  Scooting: Modified independent  Transfers:  Sit to Stand: Modified independent    Toilet Transfer : Supervision (simulated with no AD)      ADL Assessment:(clinical judgement)  Feeding: Contact guard assistance    Oral Facial Hygiene/Grooming: Minimum assistance    Bathing: Contact guard assistance    Upper Body Dressing: Supervision    Lower Body Dressing: Minimum assistance    Toileting: Minimum assistance     Pain:  Pt reports 9/10 pain or discomfort prior to treatment.    Pt reports 10/10 pain or discomfort post treatment. Activity Tolerance:   Good    Please refer to the flowsheet for vital signs taken during this treatment. After treatment:   [] Patient left in no apparent distress sitting up in chair  [x] Patient left in no apparent distress in bed  [x] Call bell left within reach  [x] Nursing notified  [x] Caregiver present  [] Bed alarm activated    COMMUNICATION/EDUCATION:   [] Home safety education was provided and the patient/caregiver indicated understanding. [] Patient/family have participated as able in goal setting and plan of care. [x] Patient/family agree to work toward stated goals and plan of care. [] Patient understands intent and goals of therapy, but is neutral about his/her participation.   [] Patient is unable to participate in goal setting and plan of care.     Thank you for this referral.  Maday Abrams, OTR/L  Time Calculation: 43 mins

## 2018-02-26 NOTE — PROGRESS NOTES
Bedside and Verbal shift change report given to Jael Ornelas (oncoming nurse) by Haydee Cabrales RN (offgoing nurse). Report included the following information SBAR, Kardex, Intake/Output, MAR and Recent Results.

## 2018-02-26 NOTE — PROGRESS NOTES
conducted an initial consultation and Spiritual Assessment for Yolanda Serna, who is a 61 y.o.,male. Patients Primary Language is: [place]. According to the patients EMR Gnosticism Affiliation is: [place]. The reason the Patient came to the hospital is:   Patient Active Problem List    Diagnosis Date Noted    Syncope 02/25/2018    Sprain of ligaments of cervical spine 02/25/2018    Diverticulosis 8/17 Dr Bobbi Murphy 08/22/2017    Controlled type 2 diabetes mellitus 03/29/2017    Primary hypertension 03/29/2017    Obesity (BMI 30-39.9) 03/29/2016    Gastroesophageal reflux disease without esophagitis Dr Bobbi Murphy 2002 05/20/2015    Osteoarthritis, hip, bilateral 09/14/2014    Sleep apnea Dr. Be Haley 2011 AHI 2.8 09/05/2012    Hypovitaminosis D Dr. Nazanin Kumari 01/31/2012    Dyslipidemia 01/31/2012    Chronic LE edema 07/19/2011        The  provided the following Interventions:  Initiated a relationship of care and support. Explored issues of kel, belief, spirituality and Faith/ritual needs while hospitalized. Listened empathically. Provided information about Spiritual Care Services. Offered prayer and assurance of continued prayers on patient's behalf. Chart reviewed. The following outcomes where achieved:  Patient shared limited information about both their medical narrative and spiritual journey/beliefs.  confirmed Patient's Gnosticism Affiliation. Patient expressed gratitude for 's visit. Assessment:  Patient's kel in God is very important for him to cope. Patient does not have any Faith/cultural needs that will affect patients preferences in health care. Plan:  Chaplains will continue to follow and will provide pastoral care on an as needed/requested basis.  recommends bedside caregivers page  on duty if patient shows signs of acute spiritual or emotional distress.     [address]  ([phone])

## 2018-02-27 VITALS
SYSTOLIC BLOOD PRESSURE: 166 MMHG | BODY MASS INDEX: 36.37 KG/M2 | HEART RATE: 65 BPM | OXYGEN SATURATION: 96 % | WEIGHT: 268.5 LBS | RESPIRATION RATE: 18 BRPM | DIASTOLIC BLOOD PRESSURE: 82 MMHG | HEIGHT: 72 IN | TEMPERATURE: 97 F

## 2018-02-27 LAB
ANION GAP SERPL CALC-SCNC: 9 MMOL/L (ref 3–18)
ATRIAL RATE: 61 BPM
BUN SERPL-MCNC: 23 MG/DL (ref 7–18)
BUN/CREAT SERPL: 24 (ref 12–20)
CALCIUM SERPL-MCNC: 8.9 MG/DL (ref 8.5–10.1)
CALCULATED P AXIS, ECG09: 65 DEGREES
CALCULATED R AXIS, ECG10: 29 DEGREES
CALCULATED T AXIS, ECG11: 30 DEGREES
CHLORIDE SERPL-SCNC: 106 MMOL/L (ref 100–108)
CO2 SERPL-SCNC: 24 MMOL/L (ref 21–32)
CREAT SERPL-MCNC: 0.95 MG/DL (ref 0.6–1.3)
DIAGNOSIS, 93000: NORMAL
GLUCOSE BLD STRIP.AUTO-MCNC: 134 MG/DL (ref 70–110)
GLUCOSE BLD STRIP.AUTO-MCNC: 137 MG/DL (ref 70–110)
GLUCOSE SERPL-MCNC: 139 MG/DL (ref 74–99)
P-R INTERVAL, ECG05: 170 MS
POTASSIUM SERPL-SCNC: 4.1 MMOL/L (ref 3.5–5.5)
Q-T INTERVAL, ECG07: 438 MS
QRS DURATION, ECG06: 98 MS
QTC CALCULATION (BEZET), ECG08: 440 MS
SODIUM SERPL-SCNC: 139 MMOL/L (ref 136–145)
VENTRICULAR RATE, ECG03: 61 BPM

## 2018-02-27 PROCEDURE — 74011250637 HC RX REV CODE- 250/637: Performed by: ORTHOPAEDIC SURGERY

## 2018-02-27 PROCEDURE — 80048 BASIC METABOLIC PNL TOTAL CA: CPT | Performed by: HOSPITALIST

## 2018-02-27 PROCEDURE — 82962 GLUCOSE BLOOD TEST: CPT

## 2018-02-27 PROCEDURE — 93005 ELECTROCARDIOGRAM TRACING: CPT

## 2018-02-27 PROCEDURE — 74011250637 HC RX REV CODE- 250/637: Performed by: HOSPITALIST

## 2018-02-27 PROCEDURE — 36415 COLL VENOUS BLD VENIPUNCTURE: CPT | Performed by: HOSPITALIST

## 2018-02-27 PROCEDURE — 74011250636 HC RX REV CODE- 250/636: Performed by: ORTHOPAEDIC SURGERY

## 2018-02-27 PROCEDURE — 74011250636 HC RX REV CODE- 250/636: Performed by: HOSPITALIST

## 2018-02-27 RX ORDER — IBUPROFEN 600 MG/1
600 TABLET ORAL
Qty: 30 TAB | Refills: 0 | Status: SHIPPED | OUTPATIENT
Start: 2018-02-27 | End: 2018-03-19 | Stop reason: SDUPTHER

## 2018-02-27 RX ORDER — OXYCODONE AND ACETAMINOPHEN 5; 325 MG/1; MG/1
1-2 TABLET ORAL
Qty: 30 TAB | Refills: 0 | Status: SHIPPED | OUTPATIENT
Start: 2018-02-27 | End: 2018-09-19

## 2018-02-27 RX ORDER — AMLODIPINE BESYLATE 5 MG/1
5 TABLET ORAL
Status: COMPLETED | OUTPATIENT
Start: 2018-02-27 | End: 2018-02-27

## 2018-02-27 RX ORDER — GABAPENTIN 300 MG/1
300 CAPSULE ORAL 3 TIMES DAILY
Qty: 90 CAP | Refills: 0 | Status: SHIPPED | OUTPATIENT
Start: 2018-02-27

## 2018-02-27 RX ORDER — METHYLPREDNISOLONE 4 MG/1
4 TABLET ORAL
Qty: 1 DOSE PACK | Refills: 0 | Status: SHIPPED | OUTPATIENT
Start: 2018-02-27 | End: 2018-03-05

## 2018-02-27 RX ADMIN — PAROXETINE 20 MG: 20 TABLET, FILM COATED ORAL at 08:38

## 2018-02-27 RX ADMIN — OXYCODONE HYDROCHLORIDE AND ACETAMINOPHEN 2 TABLET: 5; 325 TABLET ORAL at 03:05

## 2018-02-27 RX ADMIN — OXYCODONE HYDROCHLORIDE AND ACETAMINOPHEN 1 TABLET: 5; 325 TABLET ORAL at 08:39

## 2018-02-27 RX ADMIN — KETOROLAC TROMETHAMINE 15 MG: 15 INJECTION, SOLUTION INTRAMUSCULAR; INTRAVENOUS at 04:53

## 2018-02-27 RX ADMIN — DEXAMETHASONE SODIUM PHOSPHATE 6 MG: 4 INJECTION, SOLUTION INTRAMUSCULAR; INTRAVENOUS at 00:35

## 2018-02-27 RX ADMIN — AMLODIPINE BESYLATE 5 MG: 5 TABLET ORAL at 08:38

## 2018-02-27 RX ADMIN — DEXAMETHASONE SODIUM PHOSPHATE 6 MG: 4 INJECTION, SOLUTION INTRAMUSCULAR; INTRAVENOUS at 05:01

## 2018-02-27 RX ADMIN — GABAPENTIN 300 MG: 300 CAPSULE ORAL at 08:38

## 2018-02-27 RX ADMIN — PANTOPRAZOLE SODIUM 40 MG: 40 TABLET, DELAYED RELEASE ORAL at 08:38

## 2018-02-27 RX ADMIN — AMLODIPINE BESYLATE 5 MG: 5 TABLET ORAL at 11:06

## 2018-02-27 RX ADMIN — DEXAMETHASONE SODIUM PHOSPHATE 6 MG: 4 INJECTION, SOLUTION INTRAMUSCULAR; INTRAVENOUS at 11:06

## 2018-02-27 NOTE — DISCHARGE SUMMARY
Discharge Summary    Patient: Lauren Cleveland MRN: [id number]  CSN: [id number]    YOB: 1955  Age: 61 y.o. Sex: male    DOA: 2/25/2018 LOS:  LOS: 2 days   Discharge Date:      Admission Diagnoses: Sprain of ligaments of cervical spine  Syncope  Syncope    Discharge Diagnoses:  PLEASE SEE DICTATION. Discharge Condition: Stable    PHYSICAL EXAM  Visit Vitals    /88 (BP 1 Location: Left arm, BP Patient Position: Sitting)    Pulse (!) 54    Temp 97.9 °F (36.6 °C)    Resp 20    Ht 6' (1.829 m)    Wt 121.8 kg (268 lb 8 oz)    SpO2 99%    BMI 36.42 kg/m2       General: Alert, cooperative, Left fore head buries and swelling better   Lungs:  CTA Bilaterally. No Wheezing/Rhonchi/Rales. Heart:  Regular rate and Rhythm. Abdomen: Soft, Non distended, Non tender. + Bowel sounds. Extremities: No edema/ cyanosis/ clubbing  Psych:   Good insight. Not anxious or agitated. Neurologic:  AA oriented X 3. Moves all extremities. Good Hand                                    Hospital Course: Please see dictation. code # [id number]. Discharge Medications:     Current Discharge Medication List      START taking these medications    Details   gabapentin (NEURONTIN) 300 mg capsule Take 1 Cap by mouth three (3) times daily. Qty: 90 Cap, Refills: 0      oxyCODONE-acetaminophen (PERCOCET) 5-325 mg per tablet Take 1-2 Tabs by mouth every six (6) hours as needed. Max Daily Amount: 8 Tabs. Qty: 30 Tab, Refills: 0    Associated Diagnoses: Injury of neck, initial encounter      methylPREDNISolone (MEDROL, DERIAN,) 4 mg tablet Take 1 Tab by mouth Specific Days and Specific Times for 6 days. Qty: 1 Dose Pack, Refills: 0      ibuprofen (MOTRIN) 600 mg tablet Take 1 Tab by mouth every eight (8) hours as needed for Pain (with meals). Qty: 30 Tab, Refills: 0      OTHER PT/OT for B/L UE 2-3 times week.   Diagnosis: B/L UE radiculopathy  Qty: 1 Each, Refills: 0         CONTINUE these medications which have NOT CHANGED Details   furosemide (LASIX) 40 mg tablet TAKE ONE TABLET BY MOUTH DAILY  Qty: 90 Tab, Refills: 0      benazepril (LOTENSIN) 40 mg tablet TAKE ONE TABLET BY MOUTH DAILY  Qty: 30 Tab, Refills: 8    Associated Diagnoses: Primary hypertension      amLODIPine (NORVASC) 10 mg tablet TAKE ONE TABLET BY MOUTH DAILY  Qty: 90 Tab, Refills: 1      atorvastatin (LIPITOR) 40 mg tablet TAKE ONE TABLET BY MOUTH DAILY  Qty: 90 Tab, Refills: 3    Associated Diagnoses: Dyslipidemia      metFORMIN ER (GLUCOPHAGE XR) 500 mg tablet TAKE ONE TABLET BY MOUTH DAILY WITH DINNER  Qty: 30 Tab, Refills: 10    Associated Diagnoses: Diabetes mellitus type 2, controlled ([facility])      PARoxetine (PAXIL) 20 mg tablet TAKE ONE TABLET BY MOUTH DAILY  Qty: 30 Tab, Refills: 10      VITAMIN D2 50,000 unit capsule TAKE 1 CAPSULE BY MOUTH ON TUESDAY AND THURSDAY  Qty: 26 Cap, Refills: 0    Associated Diagnoses: Hypovitaminosis D      valACYclovir (VALTREX) 1 gram tablet TAKE ONE TABLET BY MOUTH THREE TIMES A DAY  Qty: 21 Tab, Refills: 0    Associated Diagnoses: Herpes zoster without complication      metolazone (ZAROXOLYN) 2.5 mg tablet Take 2.5 mg by mouth daily as needed. Dexlansoprazole (DEXILANT) 60 mg CpDM Take  by mouth daily. Associated Diagnoses: Gastroesophageal reflux disease without esophagitis         STOP taking these medications       metoprolol (LOPRESSOR) 25 mg tablet Comments:   Reason for Stopping:             · It is important that you take the medication exactly as they are prescribed. · Keep your medication in the bottles provided by the pharmacist and keep a list of the medication names, dosages, and times to be taken in your wallet. · Do not take other medications without consulting your doctor.      DIET:  Cardiac Diet and Diabetic Diet    ACTIVITY: Activity as tolerated  · Out patient PT/OT consult      ADDITIONAL INFORMATION: If you experience any of the following symptoms but not limited to Fever, chills, nausea, vomiting, diarrhea, change in mentation, falling, bleeding, shortness of breath, chest pain, please call your primary care physician or return to the emergency room if you cannot get hold of your doctor:     FOLLOW UP CARE:  Dr. Ariadna Emery MD in 7-10 days. Please call and set up an appointment.   Dr. Arlet Herbert in 2 week  Dr. Raquel Barrow in 4 week    Minutes spent on discharge: 40 minutes spent coordinating this discharge (review instructions/follow-up, prescriptions, preparing report for sign off)    Loyda Zhao MD  2/27/2018 9:52 AM

## 2018-02-27 NOTE — DISCHARGE INSTRUCTIONS
Neck Strain: Care Instructions  Your Care Instructions    You have strained the muscles and ligaments in your neck. A sudden, awkward movement can strain the neck. This often occurs with falls or car accidents or during certain sports. Everyday activities like working on a computer or sleeping can also cause neck strain if they force you to hold your neck in an awkward position for a long time. It is common for neck pain to get worse for a day or two after an injury, but it should start to feel better after that. You may have more pain and stiffness for several days before it gets better. This is expected. It may take a few weeks or longer for it to heal completely. Good home treatment can help you get better faster and avoid future neck problems. Follow-up care is a key part of your treatment and safety. Be sure to make and go to all appointments, and call your doctor if you are having problems. It's also a good idea to know your test results and keep a list of the medicines you take. How can you care for yourself at home? · If you were given a neck brace (cervical collar) to limit neck motion, wear it as instructed for as many days as your doctor tells you to. Do not wear it longer than you were told to. Wearing a brace for too long can make neck stiffness worse and weaken the neck muscles. · You can try using heat or ice to see if it helps. ¨ Try using a heating pad on a low or medium setting for 15 to 20 minutes every 2 to 3 hours. Try a warm shower in place of one session with the heating pad. You can also buy single-use heat wraps that last up to 8 hours. ¨ You can also try an ice pack for 10 to 15 minutes every 2 to 3 hours. · Take pain medicines exactly as directed. ¨ If the doctor gave you a prescription medicine for pain, take it as prescribed. ¨ If you are not taking a prescription pain medicine, ask your doctor if you can take an over-the-counter medicine.   · Gently rub the area to relieve pain and help with blood flow. Do not massage the area if it hurts to do so. · Do not do anything that makes the pain worse. Take it easy for a couple of days. You can do your usual activities if they do not hurt your neck or put it at risk for more stress or injury. · Try sleeping on a special neck pillow. Place it under your neck, not under your head. Placing a tightly rolled-up towel under your neck while you sleep will also work. If you use a neck pillow or rolled towel, do not use your regular pillow at the same time. · To prevent future neck pain, do exercises to stretch and strengthen your neck and back. Learn how to use good posture, safe lifting techniques, and proper body mechanics. When should you call for help? Call 911 anytime you think you may need emergency care. For example, call if:  ? · You are unable to move an arm or a leg at all. ?Call your doctor now or seek immediate medical care if:  ? · You have new or worse symptoms in your arms, legs, chest, belly, or buttocks. Symptoms may include:  ¨ Numbness or tingling. ¨ Weakness. ¨ Pain. ? · You lose bladder or bowel control. ? Watch closely for changes in your health, and be sure to contact your doctor if:  ? · You are not getting better as expected. Where can you learn more? Go to http://keyanna-uhe.info/. Enter M253 in the search box to learn more about \"Neck Strain: Care Instructions. \"  Current as of: March 21, 2017  Content Version: 11.4  © 0707-5557 [employer]. Care instructions adapted under license by [employer] (which disclaims liability or warranty for this information). If you have questions about a medical condition or this instruction, always ask your healthcare professional. [address] any warranty or liability for your use of this information.          Fainting: Care Instructions  Your Care Instructions    When you faint, or pass out, you lose consciousness for a short time. A brief drop in blood flow to the brain often causes it. When you fall or lie down, more blood flows to your brain and you regain consciousness. Emotional stress, pain, or overheating-especially if you have been standing-can make you faint. In these cases, fainting is usually not serious. But fainting can be a sign of a more serious problem. Your doctor may want you to have more tests to rule out other causes. The treatment you need depends on the reason why you fainted. The doctor has checked you carefully, but problems can develop later. If you notice any problems or new symptoms, get medical treatment right away. Follow-up care is a key part of your treatment and safety. Be sure to make and go to all appointments, and call your doctor if you are having problems. It's also a good idea to know your test results and keep a list of the medicines you take. How can you care for yourself at home? · Drink plenty of fluids to prevent dehydration. If you have kidney, heart, or liver disease and have to limit fluids, talk with your doctor before you increase your fluid intake. When should you call for help? Call 911 anytime you think you may need emergency care. For example, call if:  ? · You have symptoms of a heart problem. These may include:  ¨ Chest pain or pressure. ¨ Severe trouble breathing. ¨ A fast or irregular heartbeat. ¨ Lightheadedness or sudden weakness. ¨ Coughing up pink, foamy mucus. ¨ Passing out. After you call 911, the  may tell you to chew 1 adult-strength or 2 to 4 low-dose aspirin. Wait for an ambulance. Do not try to drive yourself. ? · You have symptoms of a stroke. These may include:  ¨ Sudden numbness, tingling, weakness, or loss of movement in your face, arm, or leg, especially on only one side of your body. ¨ Sudden vision changes. ¨ Sudden trouble speaking. ¨ Sudden confusion or trouble understanding simple statements.   ¨ Sudden problems with walking or balance. ¨ A sudden, severe headache that is different from past headaches. ? · You passed out (lost consciousness) again. ? Watch closely for changes in your health, and be sure to contact your doctor if:  ? · You do not get better as expected. Where can you learn more? Go to http://keyanna-hue.info/. Enter K897 in the search box to learn more about \"Fainting: Care Instructions. \"  Current as of: March 20, 2017  Content Version: 11.4  © 2398-3270 [employer]. Care instructions adapted under license by [employer] (which disclaims liability or warranty for this information). If you have questions about a medical condition or this instruction, always ask your healthcare professional. [address] any warranty or liability for your use of this information. Neck Strain or Sprain: Rehab Exercises  Your Care Instructions  Here are some examples of typical rehabilitation exercises for your condition. Start each exercise slowly. Ease off the exercise if you start to have pain. Your doctor or physical therapist will tell you when you can start these exercises and which ones will work best for you. How to do the exercises  Neck rotation    1. Sit in a firm chair, or stand up straight. 2. Keeping your chin level, turn your head to the right, and hold for 15 to 30 seconds. 3. Turn your head to the left and hold for 15 to 30 seconds. 4. Repeat 2 to 4 times to each side. Neck stretches    1. Look straight ahead, and tip your right ear to your right shoulder. Do not let your left shoulder rise up as you tip your head to the right. 2. Hold for 15 to 30 seconds. 3. Tilt your head to the left. Do not let your right shoulder rise up as you tip your head to the left. 4. Hold for 15 to 30 seconds. 5. Repeat 2 to 4 times to each side. Forward neck flexion    1. Sit in a firm chair, or stand up straight.   2. Bend your head forward. 3. Hold for 15 to 30 seconds. 4. Repeat 2 to 4 times. Lateral (side) bend strengthening    1. With your right hand, place your first two fingers on your right temple. 2. Start to bend your head to the side while using gentle pressure from your fingers to keep your head from bending. 3. Hold for about 6 seconds. 4. Repeat 8 to 12 times. 5. Switch hands and repeat the same exercise on your left side. Forward bend strengthening    1. Place your first two fingers of either hand on your forehead. 2. Start to bend your head forward while using gentle pressure from your fingers to keep your head from bending. 3. Hold for about 6 seconds. 4. Repeat 8 to 12 times. Neutral position strengthening    1. Using one hand, place your fingertips on the back of your head at the top of your neck. 2. Start to bend your head backward while using gentle pressure from your fingers to keep your head from bending. 3. Hold for about 6 seconds. 4. Repeat 8 to 12 times. Chin tuck    1. Lie on the floor with a rolled-up towel under your neck. Your head should be touching the floor. 2. Slowly bring your chin toward your chest.  3. Hold for a count of 6, and then relax for up to 10 seconds. 4. Repeat 8 to 12 times. Follow-up care is a key part of your treatment and safety. Be sure to make and go to all appointments, and call your doctor if you are having problems. It's also a good idea to know your test results and keep a list of the medicines you take. Where can you learn more? Go to http://keyanna-hue.info/. Enter M679 in the search box to learn more about \"Neck Strain or Sprain: Rehab Exercises. \"  Current as of: March 21, 2017  Content Version: 11.4  © 1467-6912 [employer], [employer]. Care instructions adapted under license by [employer] (which disclaims liability or warranty for this information).  If you have questions about a medical condition or this instruction, always ask your healthcare professional. [place] [address] any warranty or liability for your use of this information. Discharge Instructions    Patient: Jacqui Diane MRN: [id number]  [facility]: [id number]    YOB: 1955  Age: 61 y.o. Sex: male    DOA: 2/25/2018 LOS:  LOS: 2 days   Discharge Date:      DIET:  Cardiac Diet and Diabetic Diet    ACTIVITY: Activity as tolerated  · Out patient PT/OT consult      ADDITIONAL INFORMATION: If you experience any of the following symptoms but not limited to Fever, chills, nausea, vomiting, diarrhea, change in mentation, falling, bleeding, shortness of breath, chest pain, please call your primary care physician or return to the emergency room if you cannot get hold of your doctor:     FOLLOW UP CARE:  Dr. Dax Gutierrez MD in 7-10 days. Please call and set up an appointment.   Dr. Preet Nelson in 2 week  Dr. Wayne Farley in 4 week      Richard Cortez MD  2/27/2018 9:49 AM

## 2018-02-27 NOTE — PROGRESS NOTES
Care Management Interventions  PCP Verified by CM: Yes (DR. JUAN HEDRICK)  Palliative Care Criteria Met (RRAT>21 & CHF Dx)?: No  Mode of Transport at Discharge: Other (see comment) (FAMILY WILL TRANSPORT)  Transition of Care Consult (CM Consult): Discharge Planning, Home Health  Physical Therapy Consult: Yes  Occupational Therapy Consult: Yes  Current Support Network: Lives with Spouse, Own Home, Family Lives Nearby (PT'S WIFE AT BEDSIDE DURING THIS INTERVIEW)  Confirm Follow Up Transport: Family  Plan discussed with Pt/Family/Caregiver: Yes   Resource Information Provided?: No  Discharge Location  Discharge Placement: Home with family assistance    Pt is a 61year old admitted for Sprain of ligaments of cervical spine, syncope. Pt is alert and oriented and sitting up on edge on bed. Pt reports that his wife will be coming to transport him home. Pt reports that he plans to go to [facility] in [facility]: Address: [address] [place] [address] Street Phone: [phone] as he has been there before and it is only about 1 mile from his house. Pt also reports that if they do not have OT there he is willing to go to the [facility] or on [place] [address] near [facility]. Spoke to Dr Ana Apple and Dr Ana Apple states that he will write prescription for OT.

## 2018-02-27 NOTE — ROUTINE PROCESS
Bedside and Verbal shift change report given to Gui Whitney RN (oncoming nurse) by Tenzin Hitchcock RN   (offgoing nurse). Report included the following information SBAR, Kardex, Intake/Output and MAR.

## 2018-02-27 NOTE — PROGRESS NOTES
Problem: Falls - Risk of  Goal: *Absence of Falls  Document Troy Fall Risk and appropriate interventions in the flowsheet.    Outcome: Progressing Towards Goal  Fall Risk Interventions:            Medication Interventions: Evaluate medications/consider consulting pharmacy, Teach patient to arise slowly         History of Falls Interventions: Room close to nurse's station, Investigate reason for fall

## 2018-02-27 NOTE — PROGRESS NOTES
vss afeb  Patients symptoms significantly improved. 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 Improving.   Plan  DC on medrol dose pack and gabapentin  PT /OT hand therapy as OP  FU in my office in 1-2 weeks  Cause of syncope and fu per hospitalist.

## 2018-02-27 NOTE — PROGRESS NOTES
[facility] - Progress Note  Admit Date: 2/25/2018    Assessment:     Hospital Problems  Date Reviewed: 1/16/2018          Codes Class Noted POA    Syncope ICD-10-CM: R55  ICD-9-CM: 780.2  2/25/2018 Unknown        Sprain of ligaments of cervical spine ICD-10-CM: S13. 4XXA  ICD-9-CM: 847.0  2/25/2018 Unknown            -Syncope:  No other episodes. ECHO results showed no significant changes compared to previous in 2015, EF 55-60% and no gross abnormalities. -Telemetry has shown multiple episodes of borderline  Bradycardia, with no pauses, no high grade AV block, intermittent PVC's and an extended QT interval, with concern for boarderline QT prolongation most likely due to medication (on thiazide, pantoprazole, odansterone, percocet). F/U EKG showed normal sinus rhythm with QT/QTc 438/440.  -Orthostatics were normal, no decrease at 1150am.    -Nerve pain: bilaterally in UE with no evidence of cord contusion. Monitored by Dr. Stefania Owens. Also being treated by orthopedics and physical therapy for compartment syndrome. -HTN:   increased BP this amwith no chest pain, palpitations, flushing, diaphoresis. On amlodipine last given 9am this morning. Consider increasing dose of amlodipine due to consistent trend of hypertensive episodes.  -Dyslipidemia: on statins  -DM Type II: on insulin    Primary cardiologist Dr. Amador Grammes:     -Syncope: Results of ECHO and exam do not show evidence of ACS as cause    Episode most likely due to vasovagal response. -DC telemetry, cleared from cardiac standpoint  -cervical spine ligament sprain with compartment syndrome. current medical and physical therapy treatments per hospitalist, neurology, orthopedics, and physical therapy.    -Dispo: discharge planning today, no further cardiac work up required, continue at home medications. Subjective:     Patient is 62 yo  male admitted post syncopal episode with bilateral arm pain.   He has hx of HTN and family history of CAD. Had stress ECHO in 2015 and followed by cardiology. He is alert, awake, responsive, sitting up in bed comfortable, talkative and pleasant. Patient denies any new complaints this am and says he is feeling much better. States pain in UE has improved. Denies chest pain, recurrent syncopal episodes, dizziness, lightheadedness, HA, palpitations, sweating. Objective:      Patient Vitals for the past 8 hrs:   Temp Pulse Resp BP SpO2   02/27/18 0758 97.9 °F (36.6 °C) (!) 54 20 179/88 99 %         Patient Vitals for the past 96 hrs:   Weight   02/27/18 0535 121.8 kg (268 lb 8 oz)   02/26/18 0558 124.2 kg (273 lb 14.4 oz)   02/25/18 1711 123.8 kg (273 lb)   02/25/18 0449 124.7 kg (275 lb)                    Intake/Output Summary (Last 24 hours) at 02/27/18 0859  Last data filed at 02/27/18 1603   Gross per 24 hour   Intake              600 ml   Output             1200 ml   Net             -600 ml       Physical Exam:  General:  Alert and oriented x4, pleasant, talkative  Neck:  Supple, trachea midline  Lungs:  Clear to auscultation bilaterally. Heart:  Irregular rate, regular rhythm  Abdomen: Soft, non-tender to palpation  Extremities:  DNVI, pulses 2+ in all extremities, LE edema right 1+, left 2+. No evidence of ecchymosis. UE tender to palpation.     Data Review:     Labs: Results:       Chemistry Recent Labs      02/27/18   0311  02/26/18   0300  02/25/18   0500   GLU  139*  133*  180*   NA  139  140  137   K  4.1  4.1  3.3*   CL  106  106  103   CO2  24  18*  22   BUN  23*  16  15   CREA  0.95  0.85  0.95   CA  8.9  9.1  8.8   AGAP  9  16  12   BUCR  24*  19  16   AP   --    --   92   TP   --    --   7.5   ALB   --    --   3.8   GLOB   --    --   3.7   AGRAT   --    --   1.0      CBC w/Diff Recent Labs      02/25/18   0500   WBC  10.4   RBC  4.48*   HGB  14.6   HCT  40.7   PLT  256   GRANS  69   LYMPH  23   EOS  1      Cardiac Enzymes No results found for: CPK, CK, CKMMB, CKMB, RCK3, CKMBT, CKNDX, CKND1, BRIANA, TROPT, TROIQ, MYRTLE, TROPT, TNIPOC, BNP, BNPP   Coagulation No results for input(s): PTP, INR, APTT in the last 72 hours.     No lab exists for component: INREXT    Lipid Panel Lab Results   Component Value Date/Time    Cholesterol, total 113 09/19/2016 10:35 PM    HDL Cholesterol 36 (L) 09/19/2016 10:35 PM    LDL, calculated 60 09/19/2016 10:35 PM    VLDL, calculated 17 09/19/2016 10:35 PM    Triglyceride 86 09/19/2016 10:35 PM    CHOL/HDL Ratio 5.3 (H) 12/29/2009 07:49 AM      BNP No results found for: BNP, BNPP, XBNPT   Liver Enzymes Recent Labs      02/25/18   0500   TP  7.5   ALB  3.8   AP  92   SGOT  31      Digoxin    Thyroid Studies Lab Results   Component Value Date/Time    TSH 0.754 05/20/2015 12:00 AM          Signed By: Shayan Del Valle     February 27, 2018

## 2018-02-28 NOTE — DISCHARGE SUMMARY
[facility]    DISCHARGE SUMMARY    Tyrese Quezada  MR#: [id number]  : 1955  ACCOUNT #: [de-identified]   ADMIT DATE: 2018  DISCHARGE DATE: 2018    PRIMARY CARE PHYSICIAN:  Dr. Janet Meehan. DISPOSITION:  Discharged to home with outpatient PT, OT.      DISCHARGE CONDITION:  Stable. DISCHARGE DIAGNOSES  1. 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 2.  Cervical cord compression with questionable injury from fall. 3.  Syncope, most likely vasovagal versus post-micturition, but cardiac workup negative. 4.  Hypertension. 5.  Dyslipidemia. 6.  Hypokalemia, repleted. 7.  Mild elevated CPK, improved now. DISCHARGE MEDICATIONS:  Amlodipine 10 mg daily, Lipitor 40 mg at bedtime, benazepril 40 mg daily, Dexilant 60 mg daily, Lasix 40 mg daily, Neurontin 300 mg 3 times daily, ibuprofen 600 mg t.i.d. with meals as needed, metformin 500 mg daily, Medrol pack as directed, Zaroxolyn 2.5 mg daily as needed, Percocet 1 tablet every 6 hours p.r.n., Paxil 20 mg daily, Valtrex 1 gram 3 times daily, vitamin D2 50,000 international units every Tuesday and Thursday. CONSULTATIONS:  Hospital consultations with Dr. Shaheen Griffith, neurology consultation. Dr. Georgette Bence, cardiology consultation. Dr. Grace Barrios, spine surgery. MAJOR INVESTIGATIONS IN HOSPITAL STAY:  The patient had a CT head on admission, which showed mild right forehead scalp contusion. Otherwise, no changes. The patient also had a CT of the cervical spine:  No CT signs of trauma. The patient also had MRI cervical spine, which showed no evidence of fracture, subluxation or contusion, mild prevertebral soft tissue edema, C4-T2 mild, C5-C6 posterior interspinous edema all suggesting minor injury or sprain. Multilevel degenerative joint disease noted.   The patient also had an echocardiogram, which showed ejection fraction of 55-60%, no regional wall motion abnormalities. HOSPITAL COURSE:  This is a 60-year-old  male who had a syncopal episode in the bathroom and after that he had bilateral upper extremity burning sensation and paresthesias who presented to the emergency room and was admitted to hospital with the concern for cervical radiculopathy versus mononeuropathy from trauma. Patient was started on steroids. Spine surgery was consulted and neurology was also consulted. Spine surgery saw the patient and recommended continued IV steroids, then gabapentin and NSAIDs. Neuro was also seen,  who also agreed the patient could have mononeuropathy from the trauma. Recommended to continue current treatment. PT, OT were also seen who saw the patient and recommended to continue therapy as an outpatient. Patient's symptoms improved. Spine surgery followed today and recommended okay for discharge and neuro also recommended okay for discharge. The patient is feeling much better now, as symptoms have significantly improved and he states his weakness this morning is significantly improved. He will be discharged home with outpatient followup. Discharge instructions, followup appointments and physical exam, please refer to medical records. Patient alert, awake, oriented x3. I discussed with the patient about discharge plan and followup appointment. He completely understood and agreed with the plan. Answered all of his questions and concerns at the bedside appropriately. I have discussed with the patient about side effects of the medications and also discussed with him about followups. He completely understands and agrees with our plan.       Mesfin Luke MD BT/FRED  D: 02/27/2018 12:12     T: 02/28/2018 10:13  JOB #: [id number]  CC: Abbey Keys MD  [address]  [facility], [address]  CC: Enriqueta Hauser MD

## 2018-03-05 ENCOUNTER — TELEPHONE (OUTPATIENT)
Dept: INTERNAL MEDICINE CLINIC | Age: 63
End: 2018-03-05

## 2018-03-05 NOTE — TELEPHONE ENCOUNTER
Pt has appt with [facility] Cloud tomorrow- he called to see if he could get pain meds today- per [facility] no- the PT said he will \"deal with his ass tomorrow\"

## 2018-03-06 ENCOUNTER — OFFICE VISIT (OUTPATIENT)
Dept: INTERNAL MEDICINE CLINIC | Age: 63
End: 2018-03-06

## 2018-03-06 VITALS
WEIGHT: 277 LBS | TEMPERATURE: 98 F | HEIGHT: 72 IN | SYSTOLIC BLOOD PRESSURE: 138 MMHG | HEART RATE: 57 BPM | BODY MASS INDEX: 37.52 KG/M2 | DIASTOLIC BLOOD PRESSURE: 82 MMHG | OXYGEN SATURATION: 97 % | RESPIRATION RATE: 14 BRPM

## 2018-03-06 DIAGNOSIS — M54.10 RADICULOPATHY, UNSPECIFIED SPINAL REGION: ICD-10-CM

## 2018-03-06 DIAGNOSIS — M48.9 CERVICAL SPINE DISEASE: ICD-10-CM

## 2018-03-06 DIAGNOSIS — R55 SYNCOPE, UNSPECIFIED SYNCOPE TYPE: ICD-10-CM

## 2018-03-06 DIAGNOSIS — S13.4XXA SPRAIN OF LIGAMENTS OF CERVICAL SPINE, INITIAL ENCOUNTER: Primary | ICD-10-CM

## 2018-03-06 RX ORDER — PREDNISONE 20 MG/1
TABLET ORAL
Qty: 18 TAB | Refills: 0 | Status: SHIPPED | OUTPATIENT
Start: 2018-03-06 | End: 2018-06-25 | Stop reason: ALTCHOICE

## 2018-03-06 NOTE — PROGRESS NOTES
1. Have you been to the ER, urgent care clinic or hospitalized since your last visit? YES. [facility] 2/27/18 for syncope, strain of cervical spine    2. Have you seen or consulted any other health care providers outside of the [address] [place] [address] since your last visit (Include any pap smears or colon screening)? NO      Do you have an Advanced Directive?  YES

## 2018-03-06 NOTE — PROGRESS NOTES
HPI/History  Lauren Cleveland is a 61 y.o.  male who presents for hospital f/u, 2/25-2/27. Of note, pt had made derogatory statements (with profanity) to staff yesterday. Pt with syncopal episode in bathroom and ultimately thought to be vasovagal or postmicturition related. Had some radicular sxs following which were possibly related, mainly in left neck/shoulder region and possible compressive effects in bilat forearms. Admitted for work up and observation. Neuro, ortho, and cardio consulted. Labs remained fairly unremarkable. EKG and Echo unremarkable. CT head negative. CT cervical spine with no signs of cervical spine trauma but did note some degenerative changes. MRI spine with no evidence of fracture, etc but showed possible minor injury/sprain to cervical region. Degenerative changes were noted and disc related compression of cord and central stenosis without myelopathy. Specialists agreed to tx with steroids then gabapentin and NSAIDs. Pt also given percocet. He has completed steroids. Today, reports tx has not worked. However, hospital notes reported significant improvement during his stay. It was recommended to see Dr. Shasha Burger ~3/11 and Dr. Bridget Vera ~3/25. Pt is not interested in following with Dr. Shasha Burger. He will be seeing Dr. Brigida Mtz (ortho/spine) at 1700 today. Pt reports what sounds to be radicular pain at left cervical region radiating toward left shoulder/UE. His forearm paresthesias are improving since gabapentin started. He denies any other developments. Patient Active Problem List   Diagnosis Code    Chronic LE edema R60.0    Hypovitaminosis D Dr. Blanca Kirk E55.9    Dyslipidemia E78.5    Sleep apnea Dr. Stormy Lagunas 2011 AHI 2.8 G47.30    Osteoarthritis, hip, bilateral M16.0    Gastroesophageal reflux disease without esophagitis Dr Elijah Clemente 2002 K21.9    Obesity (BMI 30-39. 9) E66.9    Controlled type 2 diabetes mellitus E11.9    Primary hypertension I10    Diverticulosis 8/17 Dr Elijah Clemente K57.30    Syncope R55    Sprain of ligaments of cervical spine S13. 4XXA     Past Medical History:   Diagnosis Date    Anxiety     Arthritis     between C4 and C5    Basal cell carcinoma     s/p resection    Carotid duplex 02/04/2013    No stenosis >49% bilaterally.  Carpal tunnel syndrome     Diabetes ([facility] [address] 05/2016    fbs>125 x2    Dyslipidemia     Fatty liver on Us 1/02     Fib-4 was 0.43 from 9/14    GERD (gastroesophageal reflux disease)     History of myocardial perfusion scan 01/21/2013    Mild fixed basal inferior, mid inferior, inferoapical defect likely artifact, but inrafction in RCA not entirely excluded. No ischemia. EF 53%. No WMA. Neg EKG on max EST. Ex time 8 min 15 sec.  Hypertension     Hypovitaminosis D     Low serum testosterone level 2012    negative w/u Dr. Dionisio Menjivar Morbid obesity ([facility] [address]     peak weight 286 lbs, bmi 38.8 from 7/11; intol qsymia, wellbutrin    Normal stress echo 03/31/2015    Normal study after maximal exercise. No symptoms. EF 60%. Ex time 9 min.  Prediabetes     Rhinophyma     S/P cardiac cath 01/22/2002    Patent coronary arteries. LVEDP 12. EF 60%.  Sinus bradycardia     Asymptomatic in 2011    Sleep apnea mild Dr. Arlin Bowers 2011     AHI 2.3, minimum desats 83% 2011 Dr Morgan Pruett VSD (ventricular septal defect) 2011    Noted on echo, with flow noted between the aorta near the aortic valve and the RV outflow tract/proximal main PA. This can be consistent with a small outlet VSD    Zoster     3 prior episodes in right CN5 distribution? suspect hsv instead     Past Surgical History:   Procedure Laterality Date    HX COLONOSCOPY      Dr. Mcdaniel Every 2007 negative; 8/22/17 divertics    HX HEART CATHETERIZATION  1/22/02    The right coronary artery is large and dominant. It is widely patent. The left main coronary artery is widely patent. The left anterior descending coronary artery is widely patent.  The circumflex coronary artery is widely patent. LVEDP is 12 mmHg. Overall left ventricular systolic function is within normal limits with an EF of 60%. No significant MR or AS is noted. [address]    surgery for deviated septum    HX ORTHOPAEDIC Right 1976    knee cartilage removed right knee    HX ORTHOPAEDIC Left 2012    CTR    HX ORTHOPAEDIC      Bilateral hip replacement Dr Kashmir Bagley Right     trigger release 4th finger hand    HX VASECTOMY      VASCULAR SURGERY PROCEDURE UNLIST  2/13    renal artery dopplers neg    VASECTOMY       Social History     Social History    Marital status:      Spouse name: N/A    Number of children: 1    Years of education: N/A     Occupational History    developer      Social History Main Topics    Smoking status: Former Smoker     Packs/day: 1.00     Years: 10.00     Quit date: 11/26/1987    Smokeless tobacco: Never Used    Alcohol use 1.0 oz/week     2 Standard drinks or equivalent per week      Comment: social weekly    Drug use: No    Sexual activity: Yes     Other Topics Concern    Not on file     Social History Narrative     Family History   Problem Relation Age of Onset    Diabetes Mother     Hypertension Father     Cancer Father      bladder     Current Outpatient Prescriptions   Medication Sig    predniSONE (DELTASONE) 20 mg tablet Take 3 tabs by mouth daily x 3 days, then 2 tabs daily x 3 days, then 1 tab daily x 3 days.  gabapentin (NEURONTIN) 300 mg capsule Take 1 Cap by mouth three (3) times daily.  oxyCODONE-acetaminophen (PERCOCET) 5-325 mg per tablet Take 1-2 Tabs by mouth every six (6) hours as needed. Max Daily Amount: 8 Tabs.  ibuprofen (MOTRIN) 600 mg tablet Take 1 Tab by mouth every eight (8) hours as needed for Pain (with meals).     furosemide (LASIX) 40 mg tablet TAKE ONE TABLET BY MOUTH DAILY    benazepril (LOTENSIN) 40 mg tablet TAKE ONE TABLET BY MOUTH DAILY    amLODIPine (NORVASC) 10 mg tablet TAKE ONE TABLET BY MOUTH DAILY    atorvastatin (LIPITOR) 40 mg tablet TAKE ONE TABLET BY MOUTH DAILY    metFORMIN ER (GLUCOPHAGE XR) 500 mg tablet TAKE ONE TABLET BY MOUTH DAILY WITH DINNER    PARoxetine (PAXIL) 20 mg tablet TAKE ONE TABLET BY MOUTH DAILY    valACYclovir (VALTREX) 1 gram tablet TAKE ONE TABLET BY MOUTH THREE TIMES A DAY    metolazone (ZAROXOLYN) 2.5 mg tablet Take 2.5 mg by mouth daily as needed.  Dexlansoprazole (DEXILANT) 60 mg CpDM Take  by mouth daily.  OTHER PT/OT for B/L UE 2-3 times week. Diagnosis: B/L UE radiculopathy    VITAMIN D2 50,000 unit capsule TAKE 1 CAPSULE BY MOUTH ON TUESDAY AND THURSDAY     No current facility-administered medications for this visit. Allergies   Allergen Reactions    Qsymia [Phentermine-Topiramate] Other (comments)     Dulled his thinking    Wellbutrin [Bupropion Hcl] Other (comments)     Patient said it made him feel funny       Review of Systems  Aside from those included in HPI, remainder of complete ROS negative. Available notes/summaries, labs, EKG, CT/MRIs, Echo reviewed. Physical Examination  Visit Vitals    /82 (BP 1 Location: Right arm, BP Patient Position: Sitting)    Pulse (!) 57    Temp 98 °F (36.7 °C) (Oral)    Resp 14    Ht 6' (1.829 m)    Wt 277 lb (125.6 kg)    SpO2 97%    BMI 37.57 kg/m2       General - Alert and in no acute distress. Pt appears well and comfortable currently. Not very pleasant but engaging. Nontoxic. Not anxious, non-diaphoretic. Mental status - Appropriate mood, behavior, speech content, dress, and thought processes. Eyes - Pupils equal and reactive, extraocular movements intact. No erythema or discharge. Pulm - No tachypnea, retractions, or cyanosis. Good respiratory effort. Cardiovascular - Mild bradycardia, regular rhythm. Neck/Back/UE - Neck supple without rigidity. Fairly good ROM.  Indicates left base of neck left paraspinal region as affected area with occasional radiation along superior trapezius to deltoid then upper arm. No midline spinal tenderness. No other cervical discomfort/tenderness per report. Reports some discomfort in the left cervical base mostly with rotation or lateral flexion. Mildly positive left Spurling, less so midline or right Spurling. Good shoulder and UE ROM bilat. Neurovascularly intact. No other findings. Assessment and Plan  Hospital f/u:   1. Syncope - Work up unremarkable and ultimately thought vasovagal/postmicturation. No concerning developments. Observation. Keep f/u with cardio. Return/call or visit ED if needed. 2. Cervical sprain; degenerative changes, stenosis, disc dz - Discussed treatment options and considerations at great length. Pt will be seeing Dr. Madison Bonilla later today. Opted to restart steroid taper and continue gabapentin for now and further changes/planning at Dr. Ibeth Cannon discretion. 3. Radiculopathy - Discussion and tx as above. Eval with Dr. Madison Bonilla later today. Further planning as warranted. Pt agrees with plan. PLEASE NOTE:   This document has been produced using voice recognition software. Unrecognized errors in transcription may be present.     [facility] [address] [place] [address]  [phone]  3/6/2018

## 2018-03-15 RX ORDER — AMLODIPINE BESYLATE 10 MG/1
TABLET ORAL
Qty: 90 TAB | Refills: 0 | Status: SHIPPED | OUTPATIENT
Start: 2018-03-15 | End: 2018-11-19 | Stop reason: SDUPTHER

## 2018-03-19 NOTE — TELEPHONE ENCOUNTER
Last Visit: 03/06/2018 with SUSANA Parker    Next Appointment: RTC 01/18  Previous Refill Encounters: 02/27/2018 per MD Korina Pardo #[id number]    Requested Prescriptions     Pending Prescriptions Disp Refills    ibuprofen (MOTRIN) 600 mg tablet 270 Tab 0     Sig: Take 1 Tab by mouth every eight (8) hours as needed for Pain (with meals).

## 2018-03-20 RX ORDER — IBUPROFEN 600 MG/1
600 TABLET ORAL
Qty: 270 TAB | Refills: 0 | Status: SHIPPED | OUTPATIENT
Start: 2018-03-20 | End: 2018-07-06 | Stop reason: SDUPTHER

## 2018-06-03 DIAGNOSIS — E11.9 DIABETES MELLITUS TYPE 2, CONTROLLED (HCC): ICD-10-CM

## 2018-06-04 RX ORDER — PAROXETINE HYDROCHLORIDE 20 MG/1
TABLET, FILM COATED ORAL
Qty: 30 TAB | Refills: 9 | Status: SHIPPED | OUTPATIENT
Start: 2018-06-04

## 2018-06-04 RX ORDER — METFORMIN HYDROCHLORIDE 500 MG/1
TABLET, EXTENDED RELEASE ORAL
Qty: 30 TAB | Refills: 9 | Status: SHIPPED | OUTPATIENT
Start: 2018-06-04

## 2018-06-13 DIAGNOSIS — E78.5 DYSLIPIDEMIA: ICD-10-CM

## 2018-06-13 RX ORDER — ATORVASTATIN CALCIUM 40 MG/1
TABLET, FILM COATED ORAL
Qty: 90 TAB | Refills: 2 | Status: SHIPPED | OUTPATIENT
Start: 2018-06-13

## 2018-06-25 ENCOUNTER — OFFICE VISIT (OUTPATIENT)
Dept: INTERNAL MEDICINE CLINIC | Age: 63
End: 2018-06-25

## 2018-06-25 VITALS
WEIGHT: 278 LBS | SYSTOLIC BLOOD PRESSURE: 138 MMHG | HEART RATE: 58 BPM | DIASTOLIC BLOOD PRESSURE: 84 MMHG | HEIGHT: 72 IN | BODY MASS INDEX: 37.65 KG/M2 | OXYGEN SATURATION: 98 % | RESPIRATION RATE: 16 BRPM | TEMPERATURE: 98 F

## 2018-06-25 DIAGNOSIS — E66.01 SEVERE OBESITY (BMI 35.0-39.9): ICD-10-CM

## 2018-06-25 DIAGNOSIS — M48.9 CERVICAL SPINE DISEASE: ICD-10-CM

## 2018-06-25 DIAGNOSIS — E55.9 HYPOVITAMINOSIS D: ICD-10-CM

## 2018-06-25 DIAGNOSIS — E11.9 CONTROLLED TYPE 2 DIABETES MELLITUS WITHOUT COMPLICATION, WITHOUT LONG-TERM CURRENT USE OF INSULIN (HCC): ICD-10-CM

## 2018-06-25 DIAGNOSIS — R60.0 EDEMA EXTREMITIES: Primary | ICD-10-CM

## 2018-06-25 DIAGNOSIS — I10 PRIMARY HYPERTENSION: ICD-10-CM

## 2018-06-25 DIAGNOSIS — E78.5 DYSLIPIDEMIA: ICD-10-CM

## 2018-06-25 RX ORDER — METOLAZONE 2.5 MG/1
2.5 TABLET ORAL
Qty: 30 TAB | Refills: 5 | Status: SHIPPED | OUTPATIENT
Start: 2018-06-25

## 2018-06-25 NOTE — MR AVS SNAPSHOT
[id number] 
[phone] Patient: Yocasta Abernathy MRN: [id number] JNX:[id number] Visit Information Date & Time Provider Department Dept. Phone Encounter #  
 6/25/2018 10:20 AM Jane Thompson MD [facility] [address] [place] [address] [phone] Upcoming Health Maintenance Date Due Hepatitis C Screening 1955 Pneumococcal 19-64 Medium Risk (1 of 1 - PPSV23) 2/13/1974 FOOT EXAM Q1 3/29/2018 MICROALBUMIN Q1 4/19/2018 LIPID PANEL Q1 4/19/2018 EYE EXAM RETINAL OR DILATED Q1 4/20/2018 Influenza Age 5 to Adult 8/1/2018 HEMOGLOBIN A1C Q6M 8/25/2018 COLONOSCOPY 8/22/2019 DTaP/Tdap/Td series (3 - Td) 5/20/2025 Allergies as of 6/25/2018  Review Complete On: 6/25/2018 By: Rolan Gutierrez LPN Severity Noted Reaction Type Reactions Qsymia [Phentermine-topiramate]  09/30/2015    Other (comments) Dulled his thinking Wellbutrin [Bupropion Hcl]   Intolerance Other (comments) Patient said it made him feel funny Current Immunizations  Reviewed on 9/22/2016 Name Date Influenza Vaccine 10/1/2012 Influenza Vaccine (Quad) PF 2/26/2018 11:54 AM, 9/27/2016 Influenza Vaccine Whole 1/27/2011 Pneumococcal Conjugate (PCV-13) 5/20/2015 TD Vaccine 1/1/1995 Tdap 5/20/2015, 1/30/2013 Zoster Vaccine, Live 3/29/2017 Not reviewed this visit You Were Diagnosed With   
  
 Codes Comments Edema extremities    -  Primary ICD-10-CM: R60.0 ICD-9-CM: 453. 3 Vitals BP Pulse Temp Resp Height(growth percentile) Weight(growth percentile) 142/82 (!) 58 98 °F (36.7 °C) 16 6' (1.829 m) 278 lb (126.1 kg) SpO2 BMI Smoking Status 98% 37.7 kg/m2 Former Smoker Vitals History BMI and BSA Data Body Mass Index Body Surface Area 37.7 kg/m 2 2.53 m 2 Preferred Pharmacy Pharmacy Name Phone [facility] [address] [facility] [phone] Your Updated Medication List  
  
   
This list is accurate as of 6/25/18 11:33 AM.  Always use your most recent med list. amLODIPine 10 mg tablet Commonly known as:  Maite Raddle TAKE ONE TABLET BY MOUTH DAILY  
  
 atorvastatin 40 mg tablet Commonly known as:  LIPITOR  
TAKE ONE TABLET BY MOUTH DAILY  
  
 benazepril 40 mg tablet Commonly known as:  LOTENSIN  
TAKE ONE TABLET BY MOUTH DAILY DEXILANT 60 mg Cpdb Generic drug:  Dexlansoprazole Take  by mouth daily. furosemide 40 mg tablet Commonly known as:  LASIX TAKE ONE TABLET BY MOUTH DAILY  
  
 gabapentin 300 mg capsule Commonly known as:  NEURONTIN Take 1 Cap by mouth three (3) times daily. ibuprofen 600 mg tablet Commonly known as:  MOTRIN Take 1 Tab by mouth every eight (8) hours as needed for Pain (with meals). metFORMIN  mg tablet Commonly known as:  GLUCOPHAGE XR  
TAKE ONE TABLET BY MOUTH DAILY WITH DINNER  
  
 metOLazone 2.5 mg tablet Commonly known as:  Daisy Beni Take 2.5 mg by mouth daily as needed. OTHER  
PT/OT for B/L UE 2-3 times week. Diagnosis: B/L UE radiculopathy  
  
 oxyCODONE-acetaminophen 5-325 mg per tablet Commonly known as:  PERCOCET Take 1-2 Tabs by mouth every six (6) hours as needed. Max Daily Amount: 8 Tabs. PARoxetine 20 mg tablet Commonly known as:  PAXIL TAKE ONE TABLET BY MOUTH DAILY  
  
 valACYclovir 1 gram tablet Commonly known as:  VALTREX  
TAKE ONE TABLET BY MOUTH THREE TIMES A DAY  
  
 VITAMIN D2 50,000 unit capsule Generic drug:  ergocalciferol TAKE 1 CAPSULE BY MOUTH ON TUESDAY AND THURSDAY Introducing [facility]! Dear Stephen Ugarte: 
Thank you for requesting a [employer] account. Our records indicate that you already have an active [employer] account. You can access your account anytime at [email or web address] Did you know that you can access your hospital and ER discharge instructions at any time in [employer]? You can also review all of your test results from your hospital stay or ER visit. Additional Information If you have questions, please visit the Frequently Asked Questions section of the [email or web address] website at [email or web address] Remember, [email or web address] is NOT to be used for urgent needs. For medical emergencies, dial 911. Now available from your iPhone and Android! Please provide this summary of care documentation to your next provider. Your primary care clinician is listed as Rodrigo Bush. If you have any questions after today's visit, please call [phone].

## 2018-06-25 NOTE — PROGRESS NOTES
1. Have you been to the ER, urgent care clinic or hospitalized since your last visit? YES. Feb 2018 [facility]    2. Have you seen or consulted any other health care providers outside of the [address] [place] [address] since your last visit (Include any pap smears or colon screening)? NO      Do you have an Advanced Directive? NO    Would you like information on Advanced Directives? NO    Chief Complaint   Patient presents with    Leg Swelling     bilateral leg swelling for 3 weeks ago.  Pt stated taking lasix 160 mg daily and  has not been as effective

## 2018-06-26 NOTE — PROGRESS NOTES
61 y.o. [place]ITE [place]  male who presents for eval    He is here mainly about the LE swelling. Normally, he takes the lasix and uses the zaroxolyn maybe a few times a year and he has good control. Lately, he's had more swelling, worse on the left. No pnd, orthopnea, chest, zarco, pleurisy, actual leg pain. He is interested in vascular consult at this point. Secondly, he ended up seeing Dr Vj Kaye at [facility] for his spinal issues and they are apparently planning on c spine surgery in July time frame      Past Medical History:   Diagnosis Date    Anxiety     Arthritis     between C4 and C5    Basal cell carcinoma     s/p resection    Carotid duplex 02/04/2013    No stenosis >49% bilaterally.  Carpal tunnel syndrome     Chronic LE edema 07/19/2011    dopplers negative 7/11    Diabetes ([facility] [address] 05/2016    fbs>125 x2    Dyslipidemia     Fatty liver on Us 1/02     Fib-4 was 0.43 from 9/14    GERD (gastroesophageal reflux disease)     History of myocardial perfusion scan 01/21/2013    Mild fixed basal inferior, mid inferior, inferoapical defect likely artifact, but inrafction in RCA not entirely excluded. No ischemia. EF 53%. No WMA. Neg EKG on max EST. Ex time 8 min 15 sec.  Hypertension     Hypovitaminosis D     Low serum testosterone level 2012    negative w/u Dr. Mohamud Patient Morbid obesity ([facility] [address]     peak weight 286 lbs, bmi 38.8 from 7/11; intol qsymia, wellbutrin    Normal stress echo 03/31/2015    Normal study after maximal exercise. No symptoms. EF 60%. Ex time 9 min.  Prediabetes     Rhinophyma     S/P cardiac cath 01/22/2002    Patent coronary arteries. LVEDP 12. EF 60%.       Sinus bradycardia     Asymptomatic in 2011    Sleep apnea mild Dr. Comfort Conrad 2011     AHI 2.3, minimum desats 83% 2011 Dr Sriram Todd Vasovagal syncope 02/2018    VSD (ventricular septal defect) 2011    Noted on echo, with flow noted between the aorta near the aortic valve and the RV outflow tract/proximal main PA. This can be consistent with a small outlet VSD    Zoster     3 prior episodes in right CN5 distribution? suspect hsv instead     Past Surgical History:   Procedure Laterality Date    CARDIAC SURG PROCEDURE UNLIST  02/2018    echo nl lv, ef 60%, no wma, no valvular disease, no change 5/13    HX COLONOSCOPY      Dr. Deric Hoyos 2007 negative; 8/22/17 divertics    HX HEART CATHETERIZATION  1/22/02    The right coronary artery is large and dominant. It is widely patent. The left main coronary artery is widely patent. The left anterior descending coronary artery is widely patent. The circumflex coronary artery is widely patent. LVEDP is 12 mmHg. Overall left ventricular systolic function is within normal limits with an EF of 60%. No significant MR or AS is noted. [address]    surgery for deviated septum    HX ORTHOPAEDIC Right 1976    knee cartilage removed right knee    HX ORTHOPAEDIC Left 2012    CTR    HX ORTHOPAEDIC      Bilateral hip replacement Dr Lamount Meckel Right     trigger release 4th finger hand    HX VASECTOMY      VASCULAR SURGERY PROCEDURE UNLIST  2/13    renal artery dopplers neg     Social History     Social History    Marital status:      Spouse name: N/A    Number of children: 1    Years of education: N/A     Occupational History    developer      Social History Main Topics    Smoking status: Former Smoker     Packs/day: 1.00     Years: 10.00     Quit date: 11/26/1987    Smokeless tobacco: Never Used    Alcohol use 1.0 oz/week     2 Standard drinks or equivalent per week      Comment: social weekly    Drug use: No    Sexual activity: Yes     Other Topics Concern    Not on file     Social History Narrative     Current Outpatient Prescriptions   Medication Sig    metOLazone (ZAROXOLYN) 2.5 mg tablet Take 1 Tab by mouth daily as needed.     atorvastatin (LIPITOR) 40 mg tablet TAKE ONE TABLET BY MOUTH DAILY    PARoxetine (PAXIL) 20 mg tablet TAKE ONE TABLET BY MOUTH DAILY    metFORMIN ER (GLUCOPHAGE XR) 500 mg tablet TAKE ONE TABLET BY MOUTH DAILY WITH DINNER    ibuprofen (MOTRIN) 600 mg tablet Take 1 Tab by mouth every eight (8) hours as needed for Pain (with meals).  amLODIPine (NORVASC) 10 mg tablet TAKE ONE TABLET BY MOUTH DAILY    gabapentin (NEURONTIN) 300 mg capsule Take 1 Cap by mouth three (3) times daily.  furosemide (LASIX) 40 mg tablet TAKE ONE TABLET BY MOUTH DAILY    benazepril (LOTENSIN) 40 mg tablet TAKE ONE TABLET BY MOUTH DAILY    valACYclovir (VALTREX) 1 gram tablet TAKE ONE TABLET BY MOUTH THREE TIMES A DAY    Dexlansoprazole (DEXILANT) 60 mg CpDM Take  by mouth daily.  oxyCODONE-acetaminophen (PERCOCET) 5-325 mg per tablet Take 1-2 Tabs by mouth every six (6) hours as needed. Max Daily Amount: 8 Tabs.  OTHER PT/OT for B/L UE 2-3 times week. Diagnosis: B/L UE radiculopathy    VITAMIN D2 50,000 unit capsule TAKE 1 CAPSULE BY MOUTH ON TUESDAY AND THURSDAY     No current facility-administered medications for this visit.       Allergies   Allergen Reactions    Qsymia [Phentermine-Topiramate] Other (comments)     Dulled his thinking    Wellbutrin [Bupropion Hcl] Other (comments)     Patient said it made him feel funny     REVIEW OF SYSTEMS: colo 8/17 Dr Constantin Perdomo  Ophtho  no vision change or eye pain  Oral  no mouth pain, tongue or tooth problems  Ears  no hearing loss, ear pain, fullness  Throat  no swallowing problems  Chest  no breast masses  Resp  no wheezing, chronic coughing, dyspnea  GI  no heartburn, nausea, vomiting, change in bowel habits, bleeding, hemorrhoids  Urinary  no dysuria, hematuria, flank pain, urgency, frequency  Constitutional  no wt loss, night sweats, unexplained fevers  Neuro  no focal weakness, numbness, paresthesias, incoordination, ataxia, involuntary movements    Visit Vitals    /82    Pulse (!) 58    Temp 98 °F (36.7 °C)    Resp 16    Ht 6' (1.829 m)    Wt 278 lb (126.1 kg)    SpO2 98%    BMI 37.7 kg/m2     A&O x3  Affect is appropriate. Mood stable  No apparent distress  Anicteric, no JVD, adenopathy or thyromegaly. No carotid bruits or radiated murmur  Lungs clear to auscultation, no wheezes or rales  Heart showed regular rate and rhythm. No murmur, rubs, gallops  Abdomen soft nontender, no hepatosplenomegaly or masses.    Extremities with 1-2+ edema on left, 1+ on right calves    LABS  From 1/12 showed gluc 123, cr 0.80, gfr>60, alt 31, ldl-p 1701,                                        vit d 13.5  From 4/12 showed gluc 120, cr 0.70, gfr>60, alt 34, ldl-p 1628,                                   test 268,             vit d 24.1,        psa 0.83  From 8/12 showed gluc 114, cr 0.80, gfr>60,  alt 37, ldl-p 1203, chol 148, tg 112, hd 51, ldl-c 86,  hba1c 5.4, test 439  From1/13  showed gluc 135, cr 0.90,              alt 25, ldl-p 951,   chol 110, tg 75,    hdl 36, ldl-c 59,                   test 379  From 6/14 showed gluc 85,   cr 0.80, gfr>60,  alt 19,         chol 142, tg 132, hdl 44, ldl-c 72, hba1c 5.2, wbc 7.8,  hb 14.7, plt 288, umar 4.9,   psa 1.00  From 9/14 showed gluc 96,   cr 0.89, gfr>60,  alt 27,                  hba1c 5.9, wbc 8.2,   hb 14.6, plt 289, ua neg,            From 5/15 showed gluc 104, cr 0.70, gfr 103, alt 12,         chol 162, tg 83,   hdl 53, ldl-c 93,        wbc 19.2, hb 15.3, plt 356, tsh 0.75,    vit d 48.8  From 6/15 showed                   wbc 4.4,   hb 14.5, plt 223  From 9/15 showed gluc 111, cr 0.80, gfr>60,  alt 18,         chol 120, tg 134, hdl 34, ldl-c 61, hba1c 5.7, wbc 6.7,   hb 14.9, plt 256  From 5/16 showed gluc 128, cr 0.80, gfr>60,  alt 21,         chol 124, tg 128, hdl 35, ldl-c 63, hba1c 6.4, wbc 6.6,   hb 13.8, plt 251  From 9/16 showed gluc 116, cr 0.90, gfr>60,          chol 113, tg 86,   hdl 36, ldl-c 60, hba1c 6.0, wbc 7.0,   hb 14.5, plt 258,          psa 0.65  From 4/17 showed       hba1c 5.8, chol 132, tg 139, hdl 32, ldl-c 72,                   umar neg, vit d 42.9  From 2/18 showed gluc 180, cr 0.95, gfr>60, alt 49,              wbc 10.4, hb 14.6, plt 256, ck/trop neg    Patient Active Problem List   Diagnosis Code    Chronic LE edema R60.0    Hypovitaminosis D Dr. Jeffrey Gee E55.9    Dyslipidemia E78.5    Sleep apnea Dr. Bhatt Congress 2011 AHI 2.8 G47.30    Osteoarthritis, hip, bilateral M16.0    Gastroesophageal reflux disease without esophagitis Dr Rober Vail 2002 K21.9    Controlled type 2 diabetes mellitus E11.9    Primary hypertension I10    Diverticulosis 8/17 Dr Rober Vail K57.30    Sprain of ligaments of cervical spine S13. 4XXA    Severe obesity (BMI 35.0-39.9) ([facility]) E66.01     Assessment and plan:  1. Hypertension. Continue current  2. Dyslipidemia. At target  3. Chronic edema. Will add back zaroxolyn. sched LE doppler. Second opinion vascular  4. Cardiology. F/U Dr. Borges Alert  5. GERD. Continue current and f/u Dr. Rober Vail  6. Hypovitaminosis D. Continue supplementation, check next draw  7. Morbid obesity. Declined med supervised wt loss, bariatrics  8. DM. Check labs at his convenience, f/u [facility]  9. ED. Prn viagra  10. Spine. F/U Dr [address] at [facility] 12/18    Above conditions discussed at length and patient vocalized understanding.   All questions answered to patient satisfaction

## 2018-06-29 ENCOUNTER — HOSPITAL ENCOUNTER (OUTPATIENT)
Dept: VASCULAR SURGERY | Age: 63
Discharge: HOME | End: 2018-06-29
Attending: INTERNAL MEDICINE
Payer: COMMERCIAL

## 2018-06-29 DIAGNOSIS — R60.0 EDEMA EXTREMITIES: ICD-10-CM

## 2018-06-29 PROCEDURE — 93970 EXTREMITY STUDY: CPT

## 2018-07-06 RX ORDER — IBUPROFEN 600 MG/1
TABLET ORAL
Qty: 270 TAB | Refills: 0 | Status: SHIPPED | OUTPATIENT
Start: 2018-07-06 | End: 2018-10-04 | Stop reason: SDUPTHER

## 2018-07-24 RX ORDER — FUROSEMIDE 40 MG/1
TABLET ORAL
Qty: 90 TAB | Refills: 0 | Status: SHIPPED | OUTPATIENT
Start: 2018-07-24 | End: 2018-10-18 | Stop reason: SDUPTHER

## 2018-09-19 ENCOUNTER — OFFICE VISIT (OUTPATIENT)
Dept: VASCULAR SURGERY | Age: 63
End: 2018-09-19

## 2018-09-19 VITALS
WEIGHT: 278 LBS | HEART RATE: 60 BPM | SYSTOLIC BLOOD PRESSURE: 138 MMHG | BODY MASS INDEX: 37.65 KG/M2 | RESPIRATION RATE: 14 BRPM | HEIGHT: 72 IN | DIASTOLIC BLOOD PRESSURE: 74 MMHG

## 2018-09-19 DIAGNOSIS — E66.01 SEVERE OBESITY (BMI 35.0-39.9): ICD-10-CM

## 2018-09-19 DIAGNOSIS — E11.9 CONTROLLED TYPE 2 DIABETES MELLITUS WITHOUT COMPLICATION, WITHOUT LONG-TERM CURRENT USE OF INSULIN (HCC): ICD-10-CM

## 2018-09-19 DIAGNOSIS — R60.0 EDEMA EXTREMITIES: Primary | ICD-10-CM

## 2018-09-19 NOTE — PROGRESS NOTES
Kevin Leal Chief Complaint Patient presents with  New Patient  Swelling HPI Kevin Leal is a 61 y.o. male who presents to the office today at the request of his primary care physician for leg swelling. Patient states that he has had chronic bilateral lower extremity edema for over 40 years. He states that this is very well controlled with diuretics. He notes that a couple of months ago he had an episode of increased edema and was sent for an ultrasound by his primary care physician. He states that since then the swelling has completely resolved and he is back to baseline. He states that the swelling lasted for short period of time and this went away on its own. He does not have to elevate his legs or wear any type of compression stockings. He does not have any varicose veins or spider veins. He states that he is in his complete usual state of health and is without complaint today. He states that he is strictly here to get the results of his ultrasound from June. Ultrasounds were negative for DVT at that time. He has no history of DVT. Past Medical History:  
Diagnosis Date  Anxiety  Arthritis   
 between C4 and C5  Basal cell carcinoma   
 s/p resection  Carotid duplex 02/04/2013 No stenosis >49% bilaterally.  Carpal tunnel syndrome  Chronic LE edema 07/19/2011  
 dopplers negative 7/11  Diabetes ([facility] [address] 05/2016  
 fbs>125 x2  Dyslipidemia  Fatty liver on Us 1/02 Fib-4 was 0.43 from 9/14  GERD (gastroesophageal reflux disease)  History of myocardial perfusion scan 01/21/2013 Mild fixed basal inferior, mid inferior, inferoapical defect likely artifact, but inrafction in RCA not entirely excluded. No ischemia. EF 53%. No WMA. Neg EKG on max EST. Ex time 8 min 15 sec.  Hypertension  Hypovitaminosis D  Low serum testosterone level 2012  
 negative w/u Dr. Ru Banks  Morbid obesity ([facility] [address] peak weight 286 lbs, bmi 38.8 from 7/11; intol qsymia, wellbutrin  Normal stress echo 03/31/2015 Normal study after maximal exercise. No symptoms. EF 60%. Ex time 9 min.  Prediabetes  Rhinophyma  S/P cardiac cath 01/22/2002 Patent coronary arteries. LVEDP 12. EF 60%.  Sinus bradycardia Asymptomatic in 2011  Sleep apnea mild Dr. Sabina Sánchez 2011 AHI 2.3, minimum desats 83% 2011 Dr Sabina Sánchez  Vasovagal syncope 02/2018  VSD (ventricular septal defect) 2011 Noted on echo, with flow noted between the aorta near the aortic valve and the RV outflow tract/proximal main PA. This can be consistent with a small outlet VSD  Zoster 3 prior episodes in right CN5 distribution? suspect hsv instead Patient Active Problem List  
Diagnosis Code  Chronic LE edema R60.0  Hypovitaminosis D Dr. Kayode Dominguez E55.9  Dyslipidemia E78.5  Sleep apnea Dr. Sabina Sánchez 2011 AHI 2.8 G47.30  
 Osteoarthritis, hip, bilateral M16.0  Gastroesophageal reflux disease without esophagitis Dr Brenda Bush 2002 K21.9  Controlled type 2 diabetes mellitus E11.9  Primary hypertension I10  Diverticulosis 8/17 Dr Brenda Bush K57.30  Sprain of ligaments of cervical spine S13. 4XXA  Severe obesity (BMI 35.0-39.9) ([facility] E66.01 Past Surgical History:  
Procedure Laterality Date  CARDIAC SURG PROCEDURE UNLIST  02/2018  
 echo nl lv, ef 60%, no wma, no valvular disease, no change 5/13  HX COLONOSCOPY Dr. Brenda Bush 2007 negative; 8/22/17 divertics  HX HEART CATHETERIZATION  1/22/02 The right coronary artery is large and dominant. It is widely patent. The left main coronary artery is widely patent. The left anterior descending coronary artery is widely patent. The circumflex coronary artery is widely patent. LVEDP is 12 mmHg. Overall left ventricular systolic function is within normal limits with an EF of 60%. No significant MR or AS is noted. [address] surgery for deviated septum  HX ORTHOPAEDIC Right 1976  
 knee cartilage removed right knee  HX ORTHOPAEDIC Left 2012 CTR  
 HX ORTHOPAEDIC Bilateral hip replacement Dr Meenakshi Dooley  HX OTHER SURGICAL Right   
 trigger release 4th finger hand  HX VASECTOMY  VASCULAR SURGERY PROCEDURE UNLIST  2/13  
 renal artery dopplers neg Current Outpatient Prescriptions Medication Sig Dispense Refill  furosemide (LASIX) 40 mg tablet TAKE ONE TABLET BY MOUTH DAILY 90 Tab 0  
  mg tablet TAKE ONE TABLET BY MOUTH EVERY 8 HOURS AS NEEDED FOR PAIN WITH MEALS 270 Tab 0  
 metOLazone (ZAROXOLYN) 2.5 mg tablet Take 1 Tab by mouth daily as needed. 30 Tab 5  
 atorvastatin (LIPITOR) 40 mg tablet TAKE ONE TABLET BY MOUTH DAILY 90 Tab 2  
 PARoxetine (PAXIL) 20 mg tablet TAKE ONE TABLET BY MOUTH DAILY 30 Tab 9  
 metFORMIN ER (GLUCOPHAGE XR) 500 mg tablet TAKE ONE TABLET BY MOUTH DAILY WITH DINNER 30 Tab 9  
 amLODIPine (NORVASC) 10 mg tablet TAKE ONE TABLET BY MOUTH DAILY 90 Tab 0  
 gabapentin (NEURONTIN) 300 mg capsule Take 1 Cap by mouth three (3) times daily. 90 Cap 0  
 OTHER PT/OT for B/L UE 2-3 times week. Diagnosis: B/L UE radiculopathy 1 Each 0  
 benazepril (LOTENSIN) 40 mg tablet TAKE ONE TABLET BY MOUTH DAILY 30 Tab 8  
 valACYclovir (VALTREX) 1 gram tablet TAKE ONE TABLET BY MOUTH THREE TIMES A DAY 21 Tab 0  
 Dexlansoprazole (DEXILANT) 60 mg CpDM Take  by mouth daily. Allergies Allergen Reactions  Qsymia [Phentermine-Topiramate] Other (comments) Dulled his thinking  Wellbutrin [Bupropion Hcl] Other (comments) Patient said it made him feel funny Social History Social History  Marital status:  Spouse name: N/A  
 Number of children: 1  Years of education: N/A Occupational History  developer Social History Main Topics  Smoking status: Former Smoker Packs/day: 1.00 Years: 10.00 Quit date: 11/26/1987  Smokeless tobacco: Never Used  Alcohol use 1.0 oz/week 2 Standard drinks or equivalent per week Comment: social weekly  Drug use: No  
 Sexual activity: Yes Other Topics Concern  Not on file Social History Narrative Family History Problem Relation Age of Onset  Diabetes Mother  Hypertension Father  Cancer Father   
  bladder Review of Systems Constitutional: negative HEENT: negative Respiratory: negative Cardiovascular: negative Gastrointestinal: negative Genitourinary:negative Hematologic/lymphatic: negative Musculoskeletal:negative Neurological: negative Behavioral/Psych: negative Endocrine: negative Allergic/Immunologic: negative Physical Exam:   
Visit Vitals  /74 (BP 1 Location: Left arm, BP Patient Position: Sitting)  Pulse 60  Resp 14  
 Ht 6' (1.829 m)  Wt 278 lb (126.1 kg)  BMI 37.7 kg/m2 General: Well-appearing male in no acute distress HEENT: EOMI, no scleral icterus is noted. Pulmonary: No increased work or breathing is noted. Abdomen: Obese, nondistended. Extremities: Warm and well perfused bilaterally. No significant bilateral lower extremity edema on exam today. No ulcers Neuro: Cranial nerves II through XII are grossly intact Integument: No ulcerations are identified visibly Impression and Plan: Maribel Finch is a 61 y.o. male with longtime history of bilateral lower extremity edema which is well-controlled with diuretics. He did have a recent ultrasound which was negative for DVT. He states that he is completely at baseline and only had a one-time episode of worsening edema. I did discuss the possibility of obtaining venous reflux studies although he has absolutely no signs of classic venous insufficiency outside of chronic edema. Patient states that this is not necessary as he is completely at baseline and not having any symptoms whatsoever. Discussed that he can follow-up in our office as needed. Plan was discussed. Patient expresses understanding and agrees. [address] [place] [address] 
 
 
 
PLEASE NOTE: 
This document has been produced using voice recognition software. Unrecognized errors in transcription may be present.

## 2018-10-04 RX ORDER — IBUPROFEN 600 MG/1
TABLET ORAL
Qty: 270 TAB | Refills: 0 | Status: SHIPPED | OUTPATIENT
Start: 2018-10-04

## 2018-10-16 ENCOUNTER — HOSPITAL ENCOUNTER (OUTPATIENT)
Dept: MRI IMAGING | Age: 63
Discharge: HOME | End: 2018-10-16
Attending: ORTHOPAEDIC SURGERY
Payer: COMMERCIAL

## 2018-10-16 DIAGNOSIS — M54.2 ACUTE NECK PAIN: ICD-10-CM

## 2018-10-16 DIAGNOSIS — M96.1 CERVICAL POSTLAMINECTOMY SYNDROME: ICD-10-CM

## 2018-10-16 PROCEDURE — 72141 MRI NECK SPINE W/O DYE: CPT

## 2018-11-19 ENCOUNTER — TELEPHONE (OUTPATIENT)
Dept: INTERNAL MEDICINE CLINIC | Age: 63
End: 2018-11-19

## 2018-12-23 DIAGNOSIS — E11.9 CONTROLLED TYPE 2 DIABETES MELLITUS WITHOUT COMPLICATION, WITHOUT LONG-TERM CURRENT USE OF INSULIN (HCC): ICD-10-CM

## 2018-12-24 DIAGNOSIS — E11.9 CONTROLLED TYPE 2 DIABETES MELLITUS WITHOUT COMPLICATION, WITHOUT LONG-TERM CURRENT USE OF INSULIN (HCC): ICD-10-CM

## 2018-12-25 DIAGNOSIS — E11.9 CONTROLLED TYPE 2 DIABETES MELLITUS WITHOUT COMPLICATION, WITHOUT LONG-TERM CURRENT USE OF INSULIN (HCC): ICD-10-CM

## 2019-01-03 ENCOUNTER — OFFICE VISIT (OUTPATIENT)
Dept: CARDIOLOGY CLINIC | Age: 64
End: 2019-01-03

## 2019-01-03 VITALS
OXYGEN SATURATION: 98 % | WEIGHT: 273 LBS | HEART RATE: 69 BPM | BODY MASS INDEX: 36.98 KG/M2 | HEIGHT: 72 IN | SYSTOLIC BLOOD PRESSURE: 148 MMHG | DIASTOLIC BLOOD PRESSURE: 86 MMHG

## 2019-01-03 DIAGNOSIS — E78.5 DYSLIPIDEMIA: ICD-10-CM

## 2019-01-03 DIAGNOSIS — I10 PRIMARY HYPERTENSION: Primary | ICD-10-CM

## 2019-01-03 RX ORDER — BENAZEPRIL HYDROCHLORIDE 40 MG/1
80 TABLET ORAL DAILY
Qty: 180 TAB | Refills: 3 | Status: SHIPPED | OUTPATIENT
Start: 2019-01-03 | End: 2020-04-06

## 2019-01-03 NOTE — PROGRESS NOTES
HISTORY OF PRESENT ILLNESS 
Johnnie Cisneros is a 61 y.o. male. ASSESSMENT and PLAN Mr. Wil Hansen has history of possible VSD noted on an echocardiogram back in 2011. He has had 2 subsequent echocardiographic evaluations since then. Both of the follow-up echocardiogram did not show any obvious VSD; the eccentric jet is more likely consistent with eccentric aortic insufficiency and/or pulmonic insufficiency. There is no evidence of significant pulmonary hypertension or RV dysfunction. He has no significant CAD. He did undergo coronary angiography back in 2002 which did not show any significant epicardial occlusive disease. In March of 2015, he had an episode of malaise, increased dyspnea, chest pain, and palpitations. He was sitting at his desk when all these symptoms occurred. The stress echocardiography did not reveal any significant abnormalities. Back in spring 2018, he had a syncopal episode in the middle of night when he got up to use the bathroom. Evaluation did not reveal any significant dysrhythmia as the etiology. It was felt that this was a vasovagal event, or possibly orthostasis. He fractured his cervical spine. In July 2018, he had cervical [place] performed involving C5 and C6 [place] [facility] [place] [facility]. He still has difficulty with paresthesia involving all 10 of his fingers. Prior to his surgery, he only had paresthesia involving the C6 distribution. ? CAD:   He has no documented history of CAD. He did have coronary angiography back in 2002 which was unremarkable. ? VSD: He has no objective evidence of VSD as noted above. ? BP:   His blood pressure is elevated today. He is quite concerned. I did increase his ARB, benazepril from 40 mg once a day to twice a day. He will bring in his home blood pressure cuff and have a correlated in our office within the next 1 week. He was accompanied by his wife and we discussed blood pressure in general but also the fact that we cannot be overly aggressive with blood pressure control in light of the fact that he had a syncopal episode back in spring 2018. Over 40 minutes spent on discussion alone. All questions were answered. ? HR:    Stable. ? CHF:   There is no evidence of decompensated CHF noted. ? Weight:   His weight today is 273 pounds. This is his baseline weight. However, in order to have better blood pressure control, ideally, he should lose 20-30 pounds. Again this was explained at length. ? Cholesterol:   Target LDL <90. Remains on Lipitor 40. Besides increasing his benazepril, I have also requested bilateral renal artery duplex scan to rule out renal artery stenosis as potential cause. I have also requested SMA-7 and magnesium with the fact that he is on furosemide and metolazone. I will see him back annually. Thank you. Encounter Diagnoses Name Primary?  Primary hypertension Yes  Dyslipidemia   
 
current treatment plan is effective, no change in therapy 
lab results and schedule of future lab studies reviewed with patient 
reviewed diet, exercise and weight control 
cardiovascular risk and specific lipid/LDL goals reviewed 
reviewed medications and side effects in detail HPI Today, Mr. Nate Chahal has no complaints of chest pains, or increased shortness of breath. He has baseline dyspnea on exertion which is unchanged. His exercise capacity is without significant change. He denies any orthopnea or PND. He is quite concerned about his fluctuating hypertension. He denies significant weight gain. He denies recent significant alcohol intake. Back in the spring 2018, he had a syncopal episode and fractured his cervical spine. In July 2018, he had cervical fusion performed at [facility] [place]. Review of Systems Respiratory: Negative for shortness of breath. Cardiovascular: Negative for chest pain, palpitations, orthopnea, claudication, leg swelling and PND. All other systems reviewed and are negative. Physical Exam  
Constitutional: He is oriented to person, place, and time. He appears well-developed and well-nourished. HENT:  
Head: Normocephalic. Eyes: Conjunctivae are normal.  
Neck: Neck supple. No JVD present. Carotid bruit is not present. No thyromegaly present. Cardiovascular: Normal rate and regular rhythm. Pulmonary/Chest: Breath sounds normal.  
Abdominal: Bowel sounds are normal.  
Musculoskeletal: He exhibits no edema. Neurological: He is alert and oriented to person, place, and time. Skin: Skin is warm and dry. Nursing note and vitals reviewed. PCP: Macey Marques MD 
 
Past Medical History:  
Diagnosis Date  Anxiety  Arthritis   
 between C4 and C5  Basal cell carcinoma   
 s/p resection  Carotid duplex 02/04/2013 No stenosis >49% bilaterally.  Carpal tunnel syndrome  Chronic LE edema 07/19/2011  
 dopplers negative 7/11  Diabetes ([facility] [address] 05/2016  
 fbs>125 x2  Dyslipidemia  Fatty liver on Us 1/02 Fib-4 was 0.43 from 9/14  GERD (gastroesophageal reflux disease)  History of myocardial perfusion scan 01/21/2013 Mild fixed basal inferior, mid inferior, inferoapical defect likely artifact, but inrafction in RCA not entirely excluded. No ischemia. EF 53%. No WMA. Neg EKG on max EST. Ex time 8 min 15 sec.  Hypertension  Hypovitaminosis D  Low serum testosterone level 2012  
 negative w/u Dr. Juan C Casillas  Morbid obesity ([facility] [address] peak weight 286 lbs, bmi 38.8 from 7/11; intol qsymia, wellbutrin  Normal stress echo 03/31/2015 Normal study after maximal exercise. No symptoms. EF 60%. Ex time 9 min.  Prediabetes  Rhinophyma  S/P cardiac cath 01/22/2002 Patent coronary arteries. LVEDP 12. EF 60%.  Sinus bradycardia Asymptomatic in 2011  Sleep apnea mild Dr. Dada Carpenter 2011 AHI 2.3, minimum desats 83% 2011 Dr Dada Carpenter  Vasovagal syncope 02/2018  VSD (ventricular septal defect) 2011 Noted on echo, with flow noted between the aorta near the aortic valve and the RV outflow tract/proximal main PA. This can be consistent with a small outlet VSD  Zoster 3 prior episodes in right CN5 distribution? suspect hsv instead Past Surgical History:  
Procedure Laterality Date  CARDIAC SURG PROCEDURE UNLIST  02/2018  
 echo nl lv, ef 60%, no wma, no valvular disease, no change 5/13  HX COLONOSCOPY Dr. Candy Alcaraz 2007 negative; 8/22/17 divertics  HX HEART CATHETERIZATION  1/22/02 The right coronary artery is large and dominant. It is widely patent. The left main coronary artery is widely patent. The left anterior descending coronary artery is widely patent. The circumflex coronary artery is widely patent. LVEDP is 12 mmHg. Overall left ventricular systolic function is within normal limits with an EF of 60%. No significant MR or AS is noted. [address]  
 surgery for deviated septum  HX ORTHOPAEDIC Right 1976  
 knee cartilage removed right knee  HX ORTHOPAEDIC Left 2012 CTR  
 HX ORTHOPAEDIC Bilateral hip replacement Dr Declan Hess  HX OTHER SURGICAL Right   
 trigger release 4th finger hand  HX VASECTOMY  VASCULAR SURGERY PROCEDURE UNLIST  2/13  
 renal artery dopplers neg Current Outpatient Medications Medication Sig Dispense Refill  benazepril (LOTENSIN) 40 mg tablet Take 2 Tabs by mouth daily.  180 Tab 3  
 amLODIPine (NORVASC) 10 mg tablet TAKE ONE TABLET BY MOUTH DAILY 90 Tab 3  
  furosemide (LASIX) 40 mg tablet TAKE ONE TABLET BY MOUTH DAILY 90 Tab 3  
  mg tablet TAKE ONE TABLET BY MOUTH EVERY 8 HOURS AS NEEDED FOR PAIN WITH MEALS 270 Tab 0  
 metOLazone (ZAROXOLYN) 2.5 mg tablet Take 1 Tab by mouth daily as needed. 30 Tab 5  
 atorvastatin (LIPITOR) 40 mg tablet TAKE ONE TABLET BY MOUTH DAILY 90 Tab 2  
 PARoxetine (PAXIL) 20 mg tablet TAKE ONE TABLET BY MOUTH DAILY 30 Tab 9  
 metFORMIN ER (GLUCOPHAGE XR) 500 mg tablet TAKE ONE TABLET BY MOUTH DAILY WITH DINNER 30 Tab 9  
 gabapentin (NEURONTIN) 300 mg capsule Take 1 Cap by mouth three (3) times daily. 90 Cap 0  
 OTHER PT/OT for B/L UE 2-3 times week. Diagnosis: B/L UE radiculopathy 1 Each 0  
 valACYclovir (VALTREX) 1 gram tablet TAKE ONE TABLET BY MOUTH THREE TIMES A DAY 21 Tab 0  
 Dexlansoprazole (DEXILANT) 60 mg CpDM Take  by mouth daily. The patient has a family history of 
 
Social History Tobacco Use  Smoking status: Former Smoker Packs/day: 1.00 Years: 10.00 Pack years: 10.00 Last attempt to quit: 1987 Years since quittin.1  Smokeless tobacco: Never Used Substance Use Topics  Alcohol use: Yes Alcohol/week: 1.0 oz Types: 2 Standard drinks or equivalent per week Comment: social weekly  Drug use: No  
 
 
Lab Results Component Value Date/Time Cholesterol, total 113 2016 10:35 PM  
 HDL Cholesterol 36 (L) 2016 10:35 PM  
 LDL, calculated 60 2016 10:35 PM  
 Triglyceride 86 2016 10:35 PM  
 CHOL/HDL Ratio 5.3 (H) 2009 07:49 AM  
  
 
BP Readings from Last 3 Encounters:  
19 148/86  
18 138/74  
18 138/84 Pulse Readings from Last 3 Encounters:  
19 69  
18 60  
18 (!) 58 Wt Readings from Last 3 Encounters:  
19 123.8 kg (273 lb)  
18 126.1 kg (278 lb)  
18 126.1 kg (278 lb) EKG: normal EKG, normal sinus rhythm, unchanged from previous tracings.

## 2019-01-14 ENCOUNTER — APPOINTMENT (OUTPATIENT)
Dept: PHYSICAL THERAPY | Age: 64
End: 2019-01-14
Payer: COMMERCIAL

## 2019-01-21 ENCOUNTER — HOSPITAL ENCOUNTER (OUTPATIENT)
Dept: PHYSICAL THERAPY | Age: 64
Discharge: HOME | End: 2019-01-21
Payer: COMMERCIAL

## 2019-01-21 PROCEDURE — 97110 THERAPEUTIC EXERCISES: CPT

## 2019-01-21 PROCEDURE — 97161 PT EVAL LOW COMPLEX 20 MIN: CPT

## 2019-01-21 NOTE — PROGRESS NOTES
PT DAILY TREATMENT NOTE 10-18 Patient Name: Mindy Haywood Date:2019 : 1955 [x]  Patient  Verified Payor: Jose Mediate / Plan: [place] [employer] PPO / Product Type: PPO / In time:9:10  Out time:9:45 Total Treatment Time (min): 35 Visit #: 1 of 10 Medicare/BCBS Only Total Timed Codes (min):   1:1 Treatment Time:    
 
 
Treatment Area: Neck pain [M54.2] SUBJECTIVE Pain Level (0-10 scale): 2/10 Any medication changes, allergies to medications, adverse drug reactions, diagnosis change, or new procedure performed?: [x] No    [] Yes (see summary sheet for update) Subjective functional status/changes:   [x] See paper initial evaluation form in patient chart. OBJECTIVE 25 min [x]Eval                  []Re-Eval  
 
10 min Therapeutic Exercise:  [] See flow sheet : HEP given and reviewed with Pt Rationale: increase ROM and increase strength to improve the patients ability to maintain erect posture; increase deep neck flexor strength With 
 [x] TE 
 [] TA 
 [] neuro 
 [] other: Patient Education: [x] Review HEP [] Progressed/Changed HEP based on:  
[] positioning   [] body mechanics   [] transfers   [] heat/ice application   
[] other:   
 
Other Objective/Functional Measures: FOTO 61 pts Pain Level (0-10 scale) post treatment: 2/10 ASSESSMENT/Changes in Function:  
 
Patient will continue to benefit from skilled PT services to address functional mobility deficits, address ROM deficits, address strength deficits, analyze and address soft tissue restrictions, analyze and cue movement patterns, analyze and modify body mechanics/ergonomics, assess and modify postural abnormalities and instruct in home and community integration to attain remaining goals. [x]  See Plan of Care 
[]  See progress note/recertification 
[]  See Discharge Summary PLAN 
[]  Upgrade activities as tolerated     [x]  Continue plan of care 
[]  Update interventions per flow sheet []  Discharge due to:_ 
[]  Other:_   
 
Didier Garcia, PT 1/21/2019  11:30 AM

## 2019-01-21 NOTE — PROGRESS NOTES
[facility] 
[address] 
[phone] fax Plan of Care/ Statement of Necessity for Physical Therapy Services Patient name: Aditi Bonilla Start of Care: 2019 Referral source: Sandra Bourgeois MD : 1955 Medical Diagnosis: Neck pain [M54.2] Payor: Mark Cazares / Plan: [place] [employer] / Product Type: PPO /  Onset Date:19 Treatment Diagnosis: Neck Pain Prior Hospitalization: see medical history Provider#: [id number] Medications: Verified on Patient summary List  
 Comorbidities: Anxiety; Arthritis; BMI > 30; Gastrointestinal disease; HTN; hx PCDF 2018 Prior Level of Function: Construction/Developer Executive; functionally independent The Plan of Care and following information is based on the information from the initial evaluation. Assessment/ key information: Pt is a 61 y.o. male who presents with c/o posterior neck pain, decreased deep neck flexor strength/endurance, and continued paresthesias in B hands. Pt suffered a vasovagal event on 2/25/18, falling and hitting head. Pt lost consciousness for an unspecified period of time, along with sensation in B UEs. Tests revealed C/S spondylosis with myelopathy and C5-6 laminectomy/fusion was eventually performed in 7/2018. 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 Pt continues to have paresthesias in B hands - injections to C7 region to begin in 60 days to alleviate inflammation and address lingering paresthesias. Upon exam, Pt exhibited visible swelling along posterior cervical incision; loss of lordotic curve in C/S; limited C/S AROM of Flex 42 deg, Ext 25 deg, S/B 19 deg bilaterally, Rot 38 deg bilaterally; B hand  strength of 70# left, 103# right (right hand dominant). Pt would benefit from skilled PT to address above deficits to improve Pt's mobility, deep neck flexor strength for ease with daily tasks. Evaluation Complexity History MEDIUM  Complexity : 1-2 comorbidities / personal factors will impact the outcome/ POC ; Examination LOW Complexity : 1-2 Standardized tests and measures addressing body structure, function, activity limitation and / or participation in recreation  ;Presentation LOW Complexity : Stable, uncomplicated  ;Clinical Decision Making MEDIUM Complexity : FOTO score of 26-74 Overall Complexity Rating: LOW Problem List: pain affecting function, decrease ROM, decrease strength, edema affecting function, decrease ADL/ functional abilitiies, decrease activity tolerance and decrease flexibility/ joint mobility Treatment Plan may include any combination of the following: Therapeutic exercise, Therapeutic activities, Neuromuscular re-education, Physical agent/modality, Manual therapy, Patient education, Self Care training and Functional mobility training Patient / Family readiness to learn indicated by: asking questions, trying to perform skills and interest 
Persons(s) to be included in education: patient (P) Barriers to Learning/Limitations: None Patient Goal (s): Strengthen my neck muscles and improve my neck mobility.  Patient Self Reported Health Status: good Rehabilitation Potential: good Short Term Goals: To be accomplished in 1 weeks: 
Goal: Pt to be compliant with initial HEP to improve cervical mobility and deep neck flexor strength. Status at last note/certification: Established and reviewed with Pt Long Term Goals: To be accomplished in 5 weeks: 
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 Status at last note/certification: Flex 42 deg, Ext 25 deg, S/B 19 deg bilaterally, Rot 38 deg bilaterally Goal: Pt to increase left hand  strength by at least 5# for ease with lifting and carrying items. Status at last note/certification: 75# left hand Goal: Pt to maintain neutral cervical posture x 8 hrs without pain or fatigue for ease with performing job duties. Status at last note/certification: pain, fatigue by end of work shift daily Goal: Pt to report FOTO score of 66 pts to show improved function and quality of life. Status at last note/certification: FOTO 61 pts Frequency / Duration: Patient to be seen 2 times per week for 5 weeks. Patient/ Caregiver education and instruction: Diagnosis, prognosis, exercises 
 [x]  Plan of care has been reviewed with CHERIE Garcia, PT 1/21/2019 11:34 AM 
_____________________________________________________________________ I certify that the above Therapy Services are being furnished while the patient is under my care. I agree with the treatment plan and certify that this therapy is necessary. [de-identified] Signature:____________Date:_________TIME:________ 
 
[employer], Date and Time must be completed for valid certification ** Please sign and return to [facility] 
[phone] fax

## 2019-01-24 ENCOUNTER — APPOINTMENT (OUTPATIENT)
Dept: PHYSICAL THERAPY | Age: 64
End: 2019-01-24
Payer: COMMERCIAL

## 2019-02-05 NOTE — PROGRESS NOTES
[facility] 
[address] [place], [place] [address] [place] [address] 
[phone] fax Discharge Summary Patient name: Jacque Caceres Start of Care: 2019 Referral source: Melissa Tamayo MD : 1955 Medical Diagnosis: Neck pain [M54.2] Payor: Alberto Jimenez / Plan: [place] [employer] [place] / Product Type: PPO /  Onset Date:19 Treatment Diagnosis: Neck Pain Prior Hospitalization: see medical history Provider#: [id number] Medications: Verified on Patient summary List  
 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 The patient was scheduled for 10 visits. They attended 1 of them and was last seen on 19. On the last visit they reported neck and shoulder weakness. See plan of care written on 19. Due to the inability to further their care from non-attendance, we are discharging the patient from physical therapy at this time. We appreciate the kind referral and would willingly work with this patient again, should they be able to arrange regular attendance. Your patient's health is our primary concern. Should you have any further questions or concerns, please feel free to contact me at your convenience. RECOMMENDATIONS: 
[x]Discontinue therapy: []Patient has reached or is progressing toward set goals [x]Patient is non-compliant or has abdicated 
    []Due to lack of appreciable progress towards set goals Maria D Garcia, PT 2019 12:53 PM

## 2019-02-08 ENCOUNTER — APPOINTMENT (OUTPATIENT)
Dept: PHYSICAL THERAPY | Age: 64
End: 2019-02-08

## 2019-02-12 ENCOUNTER — HOSPITAL ENCOUNTER (OUTPATIENT)
Dept: VASCULAR SURGERY | Age: 64
Discharge: HOME | End: 2019-02-12
Attending: INTERNAL MEDICINE
Payer: COMMERCIAL

## 2019-02-12 DIAGNOSIS — I10 PRIMARY HYPERTENSION: ICD-10-CM

## 2019-02-12 LAB
LEFT HILAR EDV: 45.5 CM/S
LEFT HILAR PSV: 167.6 CM/S
LEFT KIDNEY LENGTH: 13.4 CM
LEFT KIDNEY WIDTH: 5.5 CM
LEFT RENAL MID DIAS: 34 CM/S
LEFT RENAL MID RI: 0.74
LEFT RENAL MID SYS: 129.3 CM/S
LEFT RENAL PROX DIAS: 41.3 CM/S
LEFT RENAL PROX RI: 0.75
LEFT RENAL PROX SYS: 168.3 CM/S
RIGHT HILAR EDV: 26 CM/S
RIGHT HILAR PSV: 82.3 CM/S
RIGHT KIDNEY LENGTH: 12.6 CM
RIGHT KIDNEY WIDTH: 6 CM
RIGHT RENAL DIST DIAS: 26 CM/S
RIGHT RENAL DIST RI: 0.71
RIGHT RENAL DIST SYS: 88.8 CM/S
RIGHT RENAL MID DIAS: 45.9 CM/S
RIGHT RENAL MID RI: 0.7
RIGHT RENAL MID SYS: 152.3 CM/S
RIGHT RENAL PROX DIAS: 58.4 CM/S
RIGHT RENAL PROX RI: 0.76
RIGHT RENAL PROX SYS: 241.5 CM/S

## 2019-02-12 PROCEDURE — 93975 VASCULAR STUDY: CPT

## 2019-02-13 NOTE — PROGRESS NOTES
Per your last note\" Besides increasing his benazepril, I have also requested bilateral renal artery duplex scan to rule out renal artery stenosis as potential cause. I have also requested SMA-7 and magnesium with the fact that he is on furosemide and metolazone. I will see him back annually. Thank you.

## 2019-02-21 NOTE — PROGRESS NOTES
Per your last note\" Per your last note\" Besides increasing his benazepril, I have also requested bilateral renal artery duplex scan to rule out renal artery stenosis as potential cause.  I have also requested SMA-7 and magnesium with the fact that he is on furosemide and metolazone.   
I will see him back annually. Thank you.

## 2019-02-27 ENCOUNTER — TELEPHONE (OUTPATIENT)
Dept: CARDIOLOGY CLINIC | Age: 64
End: 2019-02-27

## 2019-02-27 NOTE — TELEPHONE ENCOUNTER
Called patient, verified by two identifiers, name and . Patient notified of renal duplex scan results. All questions answered at time of phone call.

## 2019-02-27 NOTE — TELEPHONE ENCOUNTER
----- Message from [id number] Nelly Xavier MD sent at 2/27/2019  9:51 AM EST ----- Let the patient know that his renal duplex scan is normal. 
 
 
----- Message ----- From: Mesha Traore LPN Sent: 2/21/2019  11:47 AM 
To: [id number] Nelly Xavier MD 
 
Per your last note\" Per your last note\" Besides increasing his benazepril, I have also requested bilateral renal artery duplex scan to rule out renal artery stenosis as potential cause.  I have also requested SMA-7 and magnesium with the fact that he is on furosemide and metolazone.   
I will see him back annually. Thank you.

## 2019-03-08 ENCOUNTER — HOSPITAL ENCOUNTER (OUTPATIENT)
Dept: PHYSICAL THERAPY | Age: 64
Discharge: HOME | End: 2019-03-08
Payer: COMMERCIAL

## 2019-03-08 PROCEDURE — 97140 MANUAL THERAPY 1/> REGIONS: CPT

## 2019-03-08 PROCEDURE — 97110 THERAPEUTIC EXERCISES: CPT

## 2019-03-08 PROCEDURE — 97162 PT EVAL MOD COMPLEX 30 MIN: CPT

## 2019-03-08 NOTE — PROGRESS NOTES
PT DAILY TREATMENT NOTE 10-18    Patient Name: Héctor Helm  Date:3/8/2019  : 1955  [x]  Patient  Verified  Payor: Jeremie Olmos / Plan: [employer] P[employer] / Product Type: PPO /    In time:1210  Out time:1255  Total Treatment Time (min): 45  Visit #: 1 of 10    Medicare/BCBS Only   Total Timed Codes (min):   1:1 Treatment Time:         Treatment Area: Neck pain [M54.2]    SUBJECTIVE  Pain Level (0-10 scale):  2  Any medication changes, allergies to medications, adverse drug reactions, diagnosis change, or new procedure performed?: [x] No    [] Yes (see summary sheet for update)  Subjective functional status/changes:   [] No changes reported      OBJECTIVE    Modality rationale:    Min Type Additional Details    [] Estim:  []Unatt       []IFC  []Premod                        []Other:  []w/ice   []w/heat  Position:  Location:    [] Estim: []Att    []TENS instruct  []NMES                    []Other:  []w/US   []w/ice   []w/heat  Position:  Location:    []  Traction: [] Cervical       []Lumbar                       [] Prone          []Supine                       []Intermittent   []Continuous Lbs:  [] before manual  [] after manual    []  Ultrasound: []Continuous   [] Pulsed                           []1MHz   []3MHz W/cm2:  Location:    []  Iontophoresis with dexamethasone         Location: [] Take home patch   [] In clinic    []  Ice     []  heat  []  Ice massage  []  Laser   []  Anodyne Position:  Location:    []  Laser with stim  []  Other:  Position:  Location:    []  Vasopneumatic Device Pressure:       [] lo [] med [] hi   Temperature: [] lo [] med [] hi   [] Skin assessment post-treatment:  []intact []redness- no adverse reaction    []redness  adverse reaction:     29 min [x]Eval                  []Re-Eval       8 min Therapeutic Exercise:  [] See flow sheet :HEP   Rationale: increase ROM and increase strength to improve the patients ability to improve postural stability with daily tasks    8 min Manual Therapy: SOR, STM bilateral cervical paraspinals/UT/SCM/scalenes, manual stretching neck, gentle manual cervical distraction stretch   Rationale: decrease pain, increase ROM, increase tissue extensibility and decrease trigger points to reduce headaches        With   [] TE   [] TA   [] neuro   [] other: Patient Education: [x] Review HEP    [] Progressed/Changed HEP based on:   [] positioning   [] body mechanics   [] transfers   [] heat/ice application    [] other:      Other Objective/Functional Measures:      Pain Level (0-10 scale) post treatment: 2    ASSESSMENT/Changes in Function:     Patient will continue to benefit from skilled PT services to modify and progress therapeutic interventions, address functional mobility deficits, address ROM deficits, address strength deficits, analyze and address soft tissue restrictions, analyze and cue movement patterns, analyze and modify body mechanics/ergonomics, assess and modify postural abnormalities, address imbalance/dizziness and instruct in home and community integration to attain remaining goals.      [x]  See Plan of Care  []  See progress note/recertification  []  See Discharge Summary         Progress towards goals / Updated goals:  See POC    PLAN  []  Upgrade activities as tolerated     [x]  Continue plan of care  []  Update interventions per flow sheet       []  Discharge due to:_  []  Other:_      Jacquie Pickard PT 3/8/2019  11:46 AM    Future Appointments   Date Time Provider Justino Godoy   3/8/2019 12:00 PM Alexandra Perez, PT [facility]

## 2019-03-08 NOTE — PROGRESS NOTES
[facility] [address] [place] [address]  [phone] fax  Plan of Care/ Statement of Necessity for Physical Therapy Services     Patient name: Mina Valdivia Start of Care: 3/8/2019   Referral source: Urban Chicas MD : 1955    Medical Diagnosis: Neck pain [M54.2]  Payor: Woody Rather / Plan: [place] [employer] PPO / Product Type: PPO /  Onset Date:19    Treatment Diagnosis: neck pain   Prior Hospitalization: see medical history Provider#: [id number]   Medications: Verified on Patient summary List    Comorbidities: arthritis, high blood pressure, visual/hearing impairment   Prior Level of Function: Functionally independent, lives with wife,  for [employer] [address] [place] [address] and following information is based on the information from the initial evaluation. Assessment/ key information: Patient is a pleasant 59year old male who returns to therapy for neck pain following C5-6 laminectomy/fusion on 18 following a fall in February of last year. He was evaluated and then discharged from therapy without attending follow ups due to illnesses in his family, and reports no real change in status since evaluation. He continues to have difficulty with turning his head, holding head up for several hours, and with postural awareness. Additionally he reports waking up multiple times a week with a headache. At evaluation Patient demonstrates the following cervical AROM: flexion 50 deg, extension 43 deg, lateral flexion Right 12 deg Left 15 deg, rotation Right 45 deg Left 33 deg. UE strength 5/5 with  strength Right 98 lbs and Left 70 lbs over three trials. Good incisional healing with disperse tenderness in the cervical musculature. Overall Patient is a good rehab candidate based on premorbid status and will benefit from skilled physical therapy in order to address the above deficits.      Evaluation Complexity History MEDIUM Complexity : 1-2 comorbidities / personal factors will impact the outcome/ POC ; Examination LOW Complexity : 1-2 Standardized tests and measures addressing body structure, function, activity limitation and / or participation in recreation  ;Presentation LOW Complexity : Stable, uncomplicated  ;Clinical Decision Making MEDIUM Complexity : FOTO score of 26-74  Overall Complexity Rating: MEDIUM  Problem List: pain affecting function, decrease ROM, decrease strength, edema affecting function, decrease ADL/ functional abilitiies, decrease activity tolerance, decrease flexibility/ joint mobility and decrease transfer abilities   Treatment Plan may include any combination of the following: Therapeutic exercise, Therapeutic activities, Neuromuscular re-education, Physical agent/modality, Manual therapy, Aquatic therapy, Patient education and Self Care training  Patient / Family readiness to learn indicated by: asking questions, trying to perform skills and interest  Persons(s) to be included in education: patient (P)  Barriers to Learning/Limitations: None  Patient Goal (s): strengthen arms and hands, mobility without pain  Patient Self Reported Health Status: good  Rehabilitation Potential: good    Short Term Goals: To be accomplished in 1 weeks:  Goal: Patient will be independent and compliant with HEP in order to progress toward long term goals. Status at last note/certification: Issued and reviewed  Long Term Goals: To be accomplished in 10 treatments:  Goal: Patient will improve FOTO assessment score to 59 pts in order to indicate improved functional abilities. Status at last note/certification: 51 pts  Goal: Patient will report little to no difficulty with performing light desk work for 8 hours due to neck pain in order to improve ease of job tasks.   Status at last note/certification: quite a bit of difficulty  Goal: Patient will improve bilateral cervical lateral flexion and rotation by at least 10 deg in order to improve ease of turning head to check traffic. Status at last note/certification: lateral flexion Right 12 deg Left 15 deg, rotation Right 45 deg Left 33 deg  Goal: Patient will improve bilateral average hand  strength by at least 5-10 pounds over three trials in order to improve ease of daily tasks. Status at last note/certification: Right 98 lbs, Left 70 lbs  Goal: Patient will report worst neck pain as 4/10 or less in order to progress toward personal goals and reduce incidence of headaches. Status at last note/certification: 92/30    Frequency / Duration: Patient to be seen 2 times per week for 10 treatments. Patient/ Caregiver education and instruction: Diagnosis, prognosis, exercises   [x]  Plan of care has been reviewed with CHERIE De La Torre, PT 3/8/2019 11:46 AM  _____________________________________________________________________  I certify that the above Therapy Services are being furnished while the patient is under my care. I agree with the treatment plan and certify that this therapy is necessary.     Physician's Signature:____________Date:_________TIME:________    ** Signature, Date and Time must be completed for valid certification **    Please sign and return to [facility]  [phone] fax

## 2019-03-12 ENCOUNTER — HOSPITAL ENCOUNTER (OUTPATIENT)
Dept: PHYSICAL THERAPY | Age: 64
Discharge: HOME | End: 2019-03-12
Payer: COMMERCIAL

## 2019-03-12 PROCEDURE — 97110 THERAPEUTIC EXERCISES: CPT

## 2019-03-12 PROCEDURE — 97140 MANUAL THERAPY 1/> REGIONS: CPT

## 2019-03-12 PROCEDURE — 97112 NEUROMUSCULAR REEDUCATION: CPT

## 2019-03-12 NOTE — PROGRESS NOTES
PT DAILY TREATMENT NOTE 10-18    Patient Name: Sabrina Armstrong  Date:3/12/2019  : 1955  [x]  Patient  Verified  Payor: Daniel Cordero / Plan: [place] [employer] PPO / Product Type: PPO /    In time:3;30  Out time:4;15  Total Treatment Time (min): 45  Visit #: 2 of 10    Medicare/BCBS Only   Total Timed Codes (min):   1:1 Treatment Time:         Treatment Area: Neck pain [M54.2]    SUBJECTIVE  Pain Level (0-10 scale): 5  Any medication changes, allergies to medications, adverse drug reactions, diagnosis change, or new procedure performed?: [x] No    [] Yes (see summary sheet for update)  Subjective functional status/changes:   [] No changes reported  The pain varies. 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    Rationale: decrease pain, increase ROM and increase tissue extensibility to improve ease of sitting for computer work         With   [] TE   [] TA   [] neuro   [] other: Patient Education: [x] Review HEP    [] Progressed/Changed HEP based on:   [] positioning   [] body mechanics   [] transfers   [] heat/ice application    [] other:      Other Objective/Functional Measures: initiated ex program     Pain Level (0-10 scale) post treatment: 2    ASSESSMENT/Changes in Function: First follow-up after eval.    Patient will continue to benefit from skilled PT services to modify and progress therapeutic interventions, address functional mobility deficits, address ROM deficits, address strength deficits, analyze and address soft tissue restrictions, analyze and cue movement patterns, analyze and modify body mechanics/ergonomics, assess and modify postural abnormalities, address imbalance/dizziness and instruct in home and community integration to attain remaining goals. []  See Plan of Care  []  See progress note/recertification  []  See Discharge Summary         Progress towards goals / Updated goals:  Goal: Patient will be independent and compliant with HEP in order to progress toward long term goals. Status at last note/certification: Issued and reviewed  Long Term Goals: To be accomplished in 10 treatments:  Goal: Patient will improve FOTO assessment score to 59 pts in order to indicate improved functional abilities. Status at last note/certification: 51 pts  Goal: Patient will report little to no difficulty with performing light desk work for 8 hours due to neck pain in order to improve ease of job tasks. Status at last note/certification: quite a bit of difficulty  Goal: Patient will improve bilateral cervical lateral flexion and rotation by at least 10 deg in order to improve ease of turning head to check traffic. Status at last note/certification: lateral flexion Right 12 deg Left 15 deg, rotation Right 45 deg Left 33 deg  Goal: Patient will improve bilateral average hand  strength by at least 5-10 pounds over three trials in order to improve ease of daily tasks. Status at last note/certification: Right 98 lbs, Left 70 lbs  Goal: Patient will report worst neck pain as 4/10 or less in order to progress toward personal goals and reduce incidence of headaches.   Status at last note/certification: 84/63     PLAN  [x]  Upgrade activities as tolerated     []  Continue plan of care  []  Update interventions per flow sheet       []  Discharge due to:_  []  Other:_      Glo Mayen, CHERIE 3/12/2019  4:25 PM    Future Appointments   Date Time Provider Justino Godoy   3/20/2019  5:00 PM Chanda Garcia, PT [facility]   3/25/2019  4:15 PM Martin, [address] 3/27/2019  4:15 PM Martin, [address]

## 2019-03-20 ENCOUNTER — HOSPITAL ENCOUNTER (OUTPATIENT)
Dept: PHYSICAL THERAPY | Age: 64
Discharge: HOME | End: 2019-03-20
Payer: COMMERCIAL

## 2019-03-20 PROCEDURE — 97112 NEUROMUSCULAR REEDUCATION: CPT

## 2019-03-20 PROCEDURE — 97140 MANUAL THERAPY 1/> REGIONS: CPT

## 2019-03-20 NOTE — PROGRESS NOTES
PT DAILY TREATMENT NOTE 10-18 Patient Name: Wil Canseco Date:3/20/2019 : 1955 [x]  Patient  Verified Payor: Jarred Aparicio / Plan: [place] [employer] / Product Type: PPO / In time:4;20  Out time:5;00 Total Treatment Time (min): 40 Visit #: 3 of 10 Medicare/BCBS Only Total Timed Codes (min):   1:1 Treatment Time:    
 
 
Treatment Area: Neck pain [M54.2] SUBJECTIVE Pain Level (0-10 scale): 5 Any medication changes, allergies to medications, adverse drug reactions, diagnosis change, or new procedure performed?: [x] No    [] Yes (see summary sheet for update) Subjective functional status/changes:   [] No changes reported Feeling sore in my neck OBJECTIVE 15 min Neuromuscular Re-education:  []  See flow sheet :  
Rationale: increase strength  to improve the patients ability to increase postural strength 25 min Manual Therapy:  STM, TPR to right UT, manual stretching to bilateral UT, lev scap. SOR Rationale: decrease pain, increase ROM and increase tissue extensibility to improve ease of ADL's, driving, reduce c/o HA's With 
 [] TE 
 [] TA 
 [] neuro 
 [] other: Patient Education: [x] Review HEP [] Progressed/Changed HEP based on:  
[] positioning   [] body mechanics   [] transfers   [] heat/ice application   
[] other:   
 
Other Objective/Functional Measures:  
Pt had to leave by 5:00, program shortened Pain Level (0-10 scale) post treatment: 2 
 
ASSESSMENT/Changes in Function: Palpable TP in right UT with referred pain into head upon palpation. Ease of symptoms after release. Patient will continue to benefit from skilled PT services to modify and progress therapeutic interventions, address functional mobility deficits, address ROM deficits, address strength deficits, analyze and address soft tissue restrictions, analyze and cue movement patterns, analyze and modify body mechanics/ergonomics, assess and modify postural abnormalities, address imbalance/dizziness and instruct in home and community integration to attain remaining goals. []  See Plan of Care 
[]  See progress note/recertification 
[]  See Discharge Summary Progress towards goals / Updated goals: 
Goal: Patient will be independent and compliant with HEP in order to progress toward long term goals. Status at last note/certification: Issued and reviewed Long Term Goals: To be accomplished in 10 treatments: 
Goal: Patient will improve FOTO assessment score to 59 pts in order to indicate improved functional abilities. Status at last note/certification: 51 pts Goal: Patient will report little to no difficulty with performing light desk work for 8 hours due to neck pain in order to improve ease of job tasks. Status at last note/certification: quite a bit of difficulty Goal: Patient will improve bilateral cervical lateral flexion and rotation by at least 10 deg in order to improve ease of turning head to check traffic. Status at last note/certification: lateral flexion Right 12 deg Left 15 deg, rotation Right 45 deg Left 33 deg Goal: Patient will improve bilateral average hand  strength by at least 5-10 pounds over three trials in order to improve ease of daily tasks. Status at last note/certification: Right 98 lbs, Left 70 lbs Goal: Patient will report worst neck pain as 4/10 or less in order to progress toward personal goals and reduce incidence of headaches. Status at last note/certification: 69/54 PLAN 
[]  Upgrade activities as tolerated     []  Continue plan of care []  Update interventions per flow sheet      
[]  Discharge due to:_ 
[]  Other:_ Shola Garcia PTA 3/20/2019  4:55 PM 
 
Future Appointments Date Time Provider Justino Godoy 3/25/2019  4:15 PM Terrance Cui [facility]  
3/27/2019  4:15 PM Kashmir Baker

## 2019-03-25 ENCOUNTER — HOSPITAL ENCOUNTER (OUTPATIENT)
Dept: PHYSICAL THERAPY | Age: 64
Discharge: HOME | End: 2019-03-25
Payer: COMMERCIAL

## 2019-03-25 PROCEDURE — 97140 MANUAL THERAPY 1/> REGIONS: CPT

## 2019-03-25 PROCEDURE — 97112 NEUROMUSCULAR REEDUCATION: CPT

## 2019-03-25 NOTE — PROGRESS NOTES
PT DAILY TREATMENT NOTE 10-18 Patient Name: Alona Swain Date:3/25/2019 : 1955 [x]  Patient  Verified Payor: Leandro Posada / Plan: [place] [employer] [place] / Product Type: PPO / In time:4;15  Out time:5;00 Total Treatment Time (min): 45 Visit #: 4 of 10 Medicare/BCBS Only Total Timed Codes (min):   1:1 Treatment Time:    
 
 
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 My neck is sore, but my hip is worse. OBJECTIVE 20 min Neuromuscular Re-education:  []  See flow sheet :  
Rationale: increase strength and improve coordination  to improve the patients ability to improve postural strength for ease of ADL's, work tasks 25 min Manual Therapy:  STM and TPR to bilateral UT Rationale: increase ROM, increase tissue extensibility and decrease trigger points to improve ease of function With 
 [] TE 
 [] TA 
 [] neuro 
 [] other: Patient Education: [x] Review HEP [] Progressed/Changed HEP based on:  
[] positioning   [] body mechanics   [] transfers   [] heat/ice application   
[] other:   
 
Other Objective/Functional Measures:   
 
Pain Level (0-10 scale) post treatment: 0 
 
ASSESSMENT/Changes in Function: tender at sub occiput and occiput. With TPR and SOR, symptoms resolvedto no pain reproted Patient will continue to benefit from skilled PT services to modify and progress therapeutic interventions, address functional mobility deficits, address ROM deficits, address strength deficits, analyze and address soft tissue restrictions, analyze and cue movement patterns, analyze and modify body mechanics/ergonomics, assess and modify postural abnormalities, address imbalance/dizziness and instruct in home and community integration to attain remaining goals. []  See Plan of Care 
[]  See progress note/recertification 
[]  See Discharge Summary Progress towards goals / Updated goals: 
Goal: Patient will be independent and compliant with HEP in order to progress toward long term goals. Goal Met Long Term Goals: To be accomplished in 10 treatments: 
Goal: Patient will improve FOTO assessment score to 59 pts in order to indicate improved functional abilities. Status at last note/certification: 51 pts Goal: Patient will report little to no difficulty with performing light desk work for 8 hours due to neck pain in order to improve ease of job tasks. Status at last note/certification: quite a bit of difficulty Goal: Patient will improve bilateral cervical lateral flexion and rotation by at least 10 deg in order to improve ease of turning head to check traffic. Status at last note/certification: lateral flexion Right 12 deg Left 15 deg, rotation Right 45 deg Left 33 deg Goal: Patient will improve bilateral average hand  strength by at least 5-10 pounds over three trials in order to improve ease of daily tasks. Status at last note/certification: Right 98 lbs, Left 70 lbs Goal: Patient will report worst neck pain as 4/10 or less in order to progress toward personal goals and reduce incidence of headaches. Status at last note/certification: 52/63 
  
PLAN [x]  Upgrade activities as tolerated     []  Continue plan of care 
[]  Update interventions per flow sheet      
[]  Discharge due to:_ 
 []  Other:_ Roger Peter PTA 3/25/2019  4:32 PM 
 
Future Appointments Date Time Provider Justino Godoy 3/27/2019  4:15 PM Martin, [address]

## 2019-03-26 ENCOUNTER — APPOINTMENT (OUTPATIENT)
Dept: CT IMAGING | Age: 64
End: 2019-03-26
Attending: EMERGENCY MEDICINE
Payer: COMMERCIAL

## 2019-03-26 ENCOUNTER — HOSPITAL ENCOUNTER (EMERGENCY)
Age: 64
Discharge: HOME | End: 2019-03-27
Attending: EMERGENCY MEDICINE
Payer: COMMERCIAL

## 2019-03-26 VITALS
TEMPERATURE: 98.9 F | HEART RATE: 66 BPM | RESPIRATION RATE: 18 BRPM | DIASTOLIC BLOOD PRESSURE: 62 MMHG | OXYGEN SATURATION: 99 % | SYSTOLIC BLOOD PRESSURE: 153 MMHG

## 2019-03-26 DIAGNOSIS — R11.10 ABDOMINAL PAIN, VOMITING, AND DIARRHEA: Primary | ICD-10-CM

## 2019-03-26 DIAGNOSIS — R10.9 ABDOMINAL PAIN, VOMITING, AND DIARRHEA: Primary | ICD-10-CM

## 2019-03-26 DIAGNOSIS — R19.7 ABDOMINAL PAIN, VOMITING, AND DIARRHEA: Primary | ICD-10-CM

## 2019-03-26 LAB
ALBUMIN SERPL-MCNC: 4 G/DL (ref 3.4–5)
ALBUMIN/GLOB SERPL: 1 {RATIO} (ref 0.8–1.7)
ALP SERPL-CCNC: 100 U/L (ref 45–117)
ALT SERPL-CCNC: 41 U/L (ref 16–61)
ANION GAP SERPL CALC-SCNC: 13 MMOL/L (ref 3–18)
AST SERPL-CCNC: 19 U/L (ref 15–37)
BASOPHILS # BLD: 0 K/UL (ref 0–0.1)
BASOPHILS NFR BLD: 0 % (ref 0–2)
BILIRUB SERPL-MCNC: 1.4 MG/DL (ref 0.2–1)
BUN SERPL-MCNC: 14 MG/DL (ref 7–18)
BUN/CREAT SERPL: 14 (ref 12–20)
CALCIUM SERPL-MCNC: 9.6 MG/DL (ref 8.5–10.1)
CHLORIDE SERPL-SCNC: 106 MMOL/L (ref 100–108)
CO2 SERPL-SCNC: 23 MMOL/L (ref 21–32)
CREAT SERPL-MCNC: 1.01 MG/DL (ref 0.6–1.3)
DIFFERENTIAL METHOD BLD: ABNORMAL
EOSINOPHIL # BLD: 0 K/UL (ref 0–0.4)
EOSINOPHIL NFR BLD: 0 % (ref 0–5)
ERYTHROCYTE [DISTWIDTH] IN BLOOD BY AUTOMATED COUNT: 12.5 % (ref 11.6–14.5)
GLOBULIN SER CALC-MCNC: 3.9 G/DL (ref 2–4)
GLUCOSE SERPL-MCNC: 158 MG/DL (ref 74–99)
HCT VFR BLD AUTO: 42.9 % (ref 36–48)
HGB BLD-MCNC: 14 G/DL (ref 13–16)
LIPASE SERPL-CCNC: 56 U/L (ref 73–393)
LYMPHOCYTES # BLD: 0.5 K/UL (ref 0.9–3.6)
LYMPHOCYTES NFR BLD: 4 % (ref 21–52)
MCH RBC QN AUTO: 33.9 PG (ref 24–34)
MCHC RBC AUTO-ENTMCNC: 36.7 G/DL (ref 31–37)
MCV RBC AUTO: 91.5 FL (ref 74–97)
MONOCYTES # BLD: 0.3 K/UL (ref 0.05–1.2)
MONOCYTES NFR BLD: 3 % (ref 3–10)
NEUTS SEG # BLD: 11.5 K/UL (ref 1.8–8)
NEUTS SEG NFR BLD: 93 % (ref 40–73)
PLATELET # BLD AUTO: 268 K/UL (ref 135–420)
PMV BLD AUTO: 10.5 FL (ref 9.2–11.8)
POTASSIUM SERPL-SCNC: 3.2 MMOL/L (ref 3.5–5.5)
PROT SERPL-MCNC: 7.9 G/DL (ref 6.4–8.2)
RBC # BLD AUTO: 4.69 M/UL (ref 4.7–5.5)
SODIUM SERPL-SCNC: 142 MMOL/L (ref 136–145)
WBC # BLD AUTO: 12.2 K/UL (ref 4.6–13.2)

## 2019-03-26 PROCEDURE — 74011250636 HC RX REV CODE- 250/636: Performed by: EMERGENCY MEDICINE

## 2019-03-26 PROCEDURE — 96375 TX/PRO/DX INJ NEW DRUG ADDON: CPT

## 2019-03-26 PROCEDURE — 96361 HYDRATE IV INFUSION ADD-ON: CPT

## 2019-03-26 PROCEDURE — 85025 COMPLETE CBC W/AUTO DIFF WBC: CPT

## 2019-03-26 PROCEDURE — 74177 CT ABD & PELVIS W/CONTRAST: CPT

## 2019-03-26 PROCEDURE — 80053 COMPREHEN METABOLIC PANEL: CPT

## 2019-03-26 PROCEDURE — 96374 THER/PROPH/DIAG INJ IV PUSH: CPT

## 2019-03-26 PROCEDURE — 99284 EMERGENCY DEPT VISIT MOD MDM: CPT

## 2019-03-26 PROCEDURE — 83690 ASSAY OF LIPASE: CPT

## 2019-03-26 RX ORDER — KETOROLAC TROMETHAMINE 15 MG/ML
15 INJECTION, SOLUTION INTRAMUSCULAR; INTRAVENOUS
Status: COMPLETED | OUTPATIENT
Start: 2019-03-26 | End: 2019-03-26

## 2019-03-26 RX ORDER — ONDANSETRON 2 MG/ML
4 INJECTION INTRAMUSCULAR; INTRAVENOUS
Status: COMPLETED | OUTPATIENT
Start: 2019-03-26 | End: 2019-03-26

## 2019-03-26 RX ORDER — DICYCLOMINE HYDROCHLORIDE 10 MG/ML
20 INJECTION INTRAMUSCULAR
Status: DISCONTINUED | OUTPATIENT
Start: 2019-03-27 | End: 2019-03-27 | Stop reason: HOSPADM

## 2019-03-26 RX ADMIN — SODIUM CHLORIDE 1000 ML: 900 INJECTION, SOLUTION INTRAVENOUS at 23:07

## 2019-03-26 RX ADMIN — ONDANSETRON 4 MG: 2 INJECTION INTRAMUSCULAR; INTRAVENOUS at 23:07

## 2019-03-26 RX ADMIN — KETOROLAC TROMETHAMINE 15 MG: 15 INJECTION, SOLUTION INTRAMUSCULAR; INTRAVENOUS at 23:07

## 2019-03-27 ENCOUNTER — APPOINTMENT (OUTPATIENT)
Dept: PHYSICAL THERAPY | Age: 64
End: 2019-03-27
Payer: COMMERCIAL

## 2019-03-27 PROCEDURE — 74011636320 HC RX REV CODE- 636/320: Performed by: EMERGENCY MEDICINE

## 2019-03-27 RX ORDER — DICYCLOMINE HYDROCHLORIDE 20 MG/1
20 TABLET ORAL EVERY 6 HOURS
Qty: 20 TAB | Refills: 0 | Status: SHIPPED | OUTPATIENT
Start: 2019-03-27 | End: 2019-04-01

## 2019-03-27 RX ORDER — ONDANSETRON 4 MG/1
4 TABLET, ORALLY DISINTEGRATING ORAL
Qty: 12 TAB | Refills: 0 | Status: SHIPPED | OUTPATIENT
Start: 2019-03-27 | End: 2020-01-21 | Stop reason: ALTCHOICE

## 2019-03-27 RX ADMIN — IOPAMIDOL 100 ML: 612 INJECTION, SOLUTION INTRAVENOUS at 00:20

## 2019-03-27 NOTE — DISCHARGE INSTRUCTIONS

## 2019-03-27 NOTE — ED PROVIDER NOTES
EMERGENCY DEPARTMENT HISTORY AND PHYSICAL EXAM 
 
11:10 PM 
 
 
Date: 3/26/2019 Patient Name: Dalia Cavazos History of Presenting Illness Chief Complaint Patient presents with  Abdominal Pain History Provided By: Patient Additional History (Context): Dalia Cavazos is a 59 y.o. male with history of diabetes who presents with complaint of diffuse abdominal cramping which is moderate and persistent throughout the day, associated with vomiting and diarrhea. He denies any fever, hematemesis or hematochezia. No known sick contacts. There is no radiation of pain. He has not taken anything for this at home, because he cannot keep any medication down. He has not had anything to eat or drink all day. PCP: Camila Jeter MD 
 
Current Facility-Administered Medications Medication Dose Route Frequency Provider Last Rate Last Dose  dicyclomine (BENTYL) 10 mg/mL injection 20 mg  20 mg IntraMUSCular AC&HS Dana Barton MD      
 
Current Outpatient Medications Medication Sig Dispense Refill  ondansetron (ZOFRAN ODT) 4 mg disintegrating tablet Take 1 Tab by mouth every eight (8) hours as needed for Nausea. 12 Tab 0  
 dicyclomine (BENTYL) 20 mg tablet Take 1 Tab by mouth every six (6) hours for 20 doses. 20 Tab 0  
 benazepril (LOTENSIN) 40 mg tablet Take 2 Tabs by mouth daily. 180 Tab 3  
 amLODIPine (NORVASC) 10 mg tablet TAKE ONE TABLET BY MOUTH DAILY 90 Tab 3  furosemide (LASIX) 40 mg tablet TAKE ONE TABLET BY MOUTH DAILY 90 Tab 3  
  mg tablet TAKE ONE TABLET BY MOUTH EVERY 8 HOURS AS NEEDED FOR PAIN WITH MEALS 270 Tab 0  
 metOLazone (ZAROXOLYN) 2.5 mg tablet Take 1 Tab by mouth daily as needed.  30 Tab 5  
 atorvastatin (LIPITOR) 40 mg tablet TAKE ONE TABLET BY MOUTH DAILY 90 Tab 2  
 PARoxetine (PAXIL) 20 mg tablet TAKE ONE TABLET BY MOUTH DAILY 30 Tab 9  
 metFORMIN ER (GLUCOPHAGE XR) 500 mg tablet TAKE ONE TABLET BY MOUTH DAILY WITH DINNER 30 Tab 9  
  gabapentin (NEURONTIN) 300 mg capsule Take 1 Cap by mouth three (3) times daily. 90 Cap 0  
 OTHER PT/OT for B/L UE 2-3 times week. Diagnosis: B/L UE radiculopathy 1 Each 0  
 valACYclovir (VALTREX) 1 gram tablet TAKE ONE TABLET BY MOUTH THREE TIMES A DAY 21 Tab 0  
 Dexlansoprazole (DEXILANT) 60 mg CpDM Take  by mouth daily. Past History Past Medical History: 
Past Medical History:  
Diagnosis Date  Anxiety  Arthritis   
 between C4 and C5  Basal cell carcinoma   
 s/p resection  Carotid duplex 02/04/2013 No stenosis >49% bilaterally.  Carpal tunnel syndrome  Chronic LE edema 07/19/2011  
 dopplers negative 7/11  Diabetes ([facility] [address] 05/2016  
 fbs>125 x2  Dyslipidemia  Fatty liver on Us 1/02 Fib-4 was 0.43 from 9/14  GERD (gastroesophageal reflux disease)  History of myocardial perfusion scan 01/21/2013 Mild fixed basal inferior, mid inferior, inferoapical defect likely artifact, but inrafction in RCA not entirely excluded. No ischemia. EF 53%. No WMA. Neg EKG on max EST. Ex time 8 min 15 sec.  Hypertension  Hypovitaminosis D  Low serum testosterone level 2012  
 negative w/u Dr. Deni Rosenthal  Morbid obesity ([facility] [address] peak weight 286 lbs, bmi 38.8 from 7/11; intol qsymia, wellbutrin  Normal stress echo 03/31/2015 Normal study after maximal exercise. No symptoms. EF 60%. Ex time 9 min.  Prediabetes  Rhinophyma  S/P cardiac cath 01/22/2002 Patent coronary arteries. LVEDP 12. EF 60%.  Sinus bradycardia Asymptomatic in 2011  Sleep apnea mild Dr. Yash Orozco 2011 AHI 2.3, minimum desats 83% 2011 Dr Yash Orozco  Vasovagal syncope 02/2018  VSD (ventricular septal defect) 2011 Noted on echo, with flow noted between the aorta near the aortic valve and the RV outflow tract/proximal main PA. This can be consistent with a small outlet VSD  Zoster 3 prior episodes in right CN5 distribution? suspect hsv instead Past Surgical History: 
Past Surgical History:  
Procedure Laterality Date  CARDIAC SURG PROCEDURE UNLIST  2018  
 echo nl lv, ef 60%, no wma, no valvular disease, no change   HX COLONOSCOPY Dr. Cinda Galeana  negative; 17 divertics  HX HEART CATHETERIZATION  02 The right coronary artery is large and dominant. It is widely patent. The left main coronary artery is widely patent. The left anterior descending coronary artery is widely patent. The circumflex coronary artery is widely patent. LVEDP is 12 mmHg. Overall left ventricular systolic function is within normal limits with an EF of 60%. No significant MR or AS is noted. [address]  
 surgery for deviated septum  HX ORTHOPAEDIC Right   
 knee cartilage removed right knee  HX ORTHOPAEDIC Left  CTR  
 HX ORTHOPAEDIC Bilateral hip replacement Dr Zabrina Jackson  HX OTHER SURGICAL Right   
 trigger release 4th finger hand  HX VASECTOMY  VASCULAR SURGERY PROCEDURE UNLIST    
 renal artery dopplers neg Family History: 
Family History Problem Relation Age of Onset  Diabetes Mother  Hypertension Father  Cancer Father   
     bladder Social History: 
Social History Tobacco Use  Smoking status: Former Smoker Packs/day: 1.00 Years: 10.00 Pack years: 10.00 Last attempt to quit: 1987 Years since quittin.3  Smokeless tobacco: Never Used Substance Use Topics  Alcohol use: Yes Alcohol/week: 1.0 oz Types: 2 Standard drinks or equivalent per week Comment: social weekly  Drug use: No  
 
 
Allergies: Allergies Allergen Reactions  Qsymia [Phentermine-Topiramate] Other (comments) Dulled his thinking  Wellbutrin [Bupropion Hcl] Other (comments) Patient said it made him feel funny Review of Systems Review of Systems Constitutional: Negative for activity change and appetite change. HENT: Negative for congestion. Eyes: Negative for visual disturbance. Respiratory: Negative for cough and shortness of breath. Cardiovascular: Negative for chest pain. Gastrointestinal: Positive for abdominal pain, diarrhea, nausea and vomiting. Genitourinary: Negative for dysuria. Musculoskeletal: Negative for arthralgias and myalgias. Skin: Negative for rash. Neurological: Negative for weakness and numbness. Physical Exam  
 
Visit Vitals /62 Pulse 66 Temp 98.9 °F (37.2 °C) Resp 18 SpO2 99% Physical Exam  
Constitutional: He is oriented to person, place, and time. He appears well-developed and well-nourished. HENT:  
Head: Normocephalic and atraumatic. Dry mucous membranes Eyes: Conjunctivae are normal.  
Neck: Normal range of motion. Neck supple. No JVD present. Cardiovascular: Normal rate, regular rhythm, normal heart sounds and intact distal pulses. No murmur heard. Pulmonary/Chest: Effort normal and breath sounds normal.  
Abdominal: Soft. Bowel sounds are normal. He exhibits no distension. There is no tenderness. Musculoskeletal: Normal range of motion. He exhibits no deformity. Lymphadenopathy:  
  He has no cervical adenopathy. Neurological: He is alert and oriented to person, place, and time. Coordination normal.  
Skin: Skin is warm and dry. No rash noted. Psychiatric: He has a normal mood and affect. Nursing note and vitals reviewed. Diagnostic Study Results Labs - Recent Results (from the past 12 hour(s)) CBC WITH AUTOMATED DIFF Collection Time: 03/26/19  9:57 PM  
Result Value Ref Range WBC 12.2 4.6 - 13.2 K/uL  
 RBC 4.69 (L) 4.70 - 5.50 M/uL  
 HGB 14.0 13.0 - 16.0 g/dL HCT 42.9 36.0 - 48.0 % MCV 91.5 74.0 - 97.0 FL  
 MCH 33.9 24.0 - 34.0 PG  
 MCHC 36.7 31.0 - 37.0 g/dL  
 RDW 12.5 11.6 - 14.5 % PLATELET 057 987 - 955 K/uL MPV 10.5 9.2 - 11.8 FL  
 NEUTROPHILS 93 (H) 40 - 73 % LYMPHOCYTES 4 (L) 21 - 52 % MONOCYTES 3 3 - 10 % EOSINOPHILS 0 0 - 5 % BASOPHILS 0 0 - 2 %  
 ABS. NEUTROPHILS 11.5 (H) 1.8 - 8.0 K/UL  
 ABS. LYMPHOCYTES 0.5 (L) 0.9 - 3.6 K/UL  
 ABS. MONOCYTES 0.3 0.05 - 1.2 K/UL  
 ABS. EOSINOPHILS 0.0 0.0 - 0.4 K/UL  
 ABS. BASOPHILS 0.0 0.0 - 0.1 K/UL  
 DF AUTOMATED METABOLIC PANEL, COMPREHENSIVE Collection Time: 03/26/19  9:57 PM  
Result Value Ref Range Sodium 142 136 - 145 mmol/L Potassium 3.2 (L) 3.5 - 5.5 mmol/L Chloride 106 100 - 108 mmol/L  
 CO2 23 21 - 32 mmol/L Anion gap 13 3.0 - 18 mmol/L Glucose 158 (H) 74 - 99 mg/dL BUN 14 7.0 - 18 MG/DL Creatinine 1.01 0.6 - 1.3 MG/DL  
 BUN/Creatinine ratio 14 12 - 20 GFR est AA >60 >60 ml/min/1.73m2 GFR est non-AA >60 >60 ml/min/1.73m2 Calcium 9.6 8.5 - 10.1 MG/DL Bilirubin, total 1.4 (H) 0.2 - 1.0 MG/DL  
 ALT (SGPT) 41 16 - 61 U/L  
 AST (SGOT) 19 15 - 37 U/L Alk. phosphatase 100 45 - 117 U/L Protein, total 7.9 6.4 - 8.2 g/dL Albumin 4.0 3.4 - 5.0 g/dL Globulin 3.9 2.0 - 4.0 g/dL A-G Ratio 1.0 0.8 - 1.7 LIPASE Collection Time: 03/26/19  9:57 PM  
Result Value Ref Range Lipase 56 (L) 73 - 393 U/L Radiologic Studies -  
CT ABD PELV W CONT Final Result IMPRESSION:  
  
No clearly acute findings. Probable hepatic steatosis. Cholelithiasis. Punctate nonobstructive nephrolith  
in the left kidney. Scattered sigmoid diverticula. Multiple mildly prominent small bowel mesentery lymph nodes with mesenteric  
haziness, nonspecific, likely reactive due to inflammation or panniculitis. Much  
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 Medical Decision Making I am the first provider for this patient. I reviewed the vital signs, available nursing notes, past medical history, past surgical history, family history and social history. Vital Signs-Reviewed the patient's vital signs. Records Reviewed: Nursing Notes (Time of Review: 11:10 PM) Provider Notes (Medical Decision Making): Alona Swain is a 59 y.o. male with history of diabetes who presents with complaint of diffuse abdominal cramping which is moderate and persistent throughout the day, associated with vomiting and diarrhea. His abdomen is benign on exam, though he appears dry and uncomfortable. Differential Diagnosis: Will consider gastroenteritis, pancreatitis, PUD/GERD, foodborne illness, constipation, hepatobiliary disease, urinary infection, inflammatory bowel disease Given history and exam, I have low suspicion for appendicitis, diverticulitis, colitis, malignancy, bowel obstruction, ureterolithiasis, SBP, anginal equivalent pain, AAA rupture, abdominal compartment syndrome, aortic dissection, mesenteric ischemia, toxicity, testicular torsion. Testing: CBC, CMP, Lipase, Urinalysis, ct abd/pelvis Treatments: IV fluids, IV Zofran, IV Toradol, IM Bentyl Re-evaluations: 
CT scan with largely nonspecific findings. Labs unremarkable. Patient states that he feels significantly improved. The patient will be discharged home. Findings were discussed at length and questions were answered. Information on all newly prescribed medications was given. the patient was instructed to follow-up with his PCP, or return to the Emergency Department with any worsened symptoms or concerns. Return precautions were given. Diagnosis Clinical Impression: 1. Abdominal pain, vomiting, and diarrhea Disposition: discharge Follow-up Information Follow up With Specialties Details Why Contact Info Patric Lennox, MD Internal Medicine  As needed [address] [facility] [address] [id number] 
[phone] [facility] EMERGENCY DEPT Emergency Medicine  If symptoms worsen [phone] [id number] 
[phone] Patient's Medications Start Taking DICYCLOMINE (BENTYL) 20 MG TABLET    Take 1 Tab by mouth every six (6) hours for 20 doses. ONDANSETRON (ZOFRAN ODT) 4 MG DISINTEGRATING TABLET    Take 1 Tab by mouth every eight (8) hours as needed for Nausea. Continue Taking AMLODIPINE (NORVASC) 10 MG TABLET    TAKE ONE TABLET BY MOUTH DAILY  
 ATORVASTATIN (LIPITOR) 40 MG TABLET    TAKE ONE TABLET BY MOUTH DAILY  
 BENAZEPRIL (LOTENSIN) 40 MG TABLET    Take 2 Tabs by mouth daily. DEXLANSOPRAZOLE (DEXILANT) 60 MG CPDM    Take  by mouth daily. FUROSEMIDE (LASIX) 40 MG TABLET    TAKE ONE TABLET BY MOUTH DAILY  
 GABAPENTIN (NEURONTIN) 300 MG CAPSULE    Take 1 Cap by mouth three (3) times daily.  MG TABLET    TAKE ONE TABLET BY MOUTH EVERY 8 HOURS AS NEEDED FOR PAIN WITH MEALS METFORMIN ER (GLUCOPHAGE XR) 500 MG TABLET    TAKE ONE TABLET BY MOUTH DAILY WITH DINNER METOLAZONE (ZAROXOLYN) 2.5 MG TABLET    Take 1 Tab by mouth daily as needed. OTHER    PT/OT for B/L UE 2-3 times week. Diagnosis: B/L UE radiculopathy PAROXETINE (PAXIL) 20 MG TABLET    TAKE ONE TABLET BY MOUTH DAILY  
 VALACYCLOVIR (VALTREX) 1 GRAM TABLET    TAKE ONE TABLET BY MOUTH THREE TIMES A DAY These Medications have changed No medications on file Stop Taking No medications on file  
 
_______________________________ Attestations: 
Scribe Attestation Julia Berrios MD acting as a scribe for and in the presence of Altagracia Song MD     
March 27, 2019 at 2:21 AM 
    
Provider Attestation:     
I personally performed the services described in the documentation, reviewed the documentation, as recorded by the scribe in my presence, and it accurately and completely records my words and actions. March 27, 2019 at 2:21 AM - Altagracia Song MD   
_______________________________

## 2019-04-01 ENCOUNTER — HOSPITAL ENCOUNTER (OUTPATIENT)
Dept: PHYSICAL THERAPY | Age: 64
Discharge: HOME | End: 2019-04-01
Payer: COMMERCIAL

## 2019-04-01 PROCEDURE — 97140 MANUAL THERAPY 1/> REGIONS: CPT

## 2019-04-01 PROCEDURE — 97112 NEUROMUSCULAR REEDUCATION: CPT

## 2019-04-01 NOTE — PROGRESS NOTES
PT DAILY TREATMENT NOTE 10-18 Patient Name: Michael Solares Date:2019 : 1955 [x]  Patient  Verified Payor: Brayan Moreno / [place] / Product Type: PPO / In time:4;15  Out time:5;00 Total Treatment Time (min): 45 Visit #: 5 of 10 Medicare/BCBS Only Total Timed Codes (min):   1:1 Treatment Time:    
 
 
Treatment Area: Neck pain [M54.2] SUBJECTIVE Pain Level (0-10 scale): 2-3 Any medication changes, allergies to medications, adverse drug reactions, diagnosis change, or new procedure performed?: [x] No    [] Yes (see summary sheet for update) Subjective functional status/changes:   [] No changes reported I've been sick with  the Josie Zabala Virus. My abdominals have been in spasm since and MD put me on something to ease it. I don't want to do the roll ups today. OBJECTIVE 35 min Neuromuscular Re-education:  []  See flow sheet :  
Rationale: increase ROM, increase strength and improve coordination  to improve the patients ability to improve postural awareness, ease of work tasks 10 min Manual Therapy:  STM to bilateral CS, Right UT TPR. Manual distraction with flexion Rationale: decrease pain, increase tissue extensibility and decrease trigger points to improve ease of ADL's With 
 [] TE 
 [] TA 
 [] neuro 
 [] other: Patient Education: [x] Review HEP [] Progressed/Changed HEP based on:  
[] positioning   [] body mechanics   [] transfers   [] heat/ice application   
[] other:   
 
Other Objective/Functional Measures:   
 
Pain Level (0-10 scale) post treatment:  0 
 
ASSESSMENT/Changes in Function: palpable TP's in Right UT and mild pain wit hleft rotation and SB. Pt reporting no symptoms after MT and ex's Patient will continue to benefit from skilled PT services to modify and progress therapeutic interventions, address functional mobility deficits, address ROM deficits, address strength deficits, analyze and address soft tissue restrictions, analyze and cue movement patterns, analyze and modify body mechanics/ergonomics, assess and modify postural abnormalities, address imbalance/dizziness and instruct in home and community integration to attain remaining goals. []  See Plan of Care 
[]  See progress note/recertification 
[]  See Discharge Summary Progress towards goals / Updated goals: 
Goal: Patient will be independent and compliant with HEP in order to progress toward long term goals. Goal Met Long Term Goals: To be accomplished in 10 treatments: 
Goal: Patient will improve FOTO assessment score to 59 pts in order to indicate improved functional abilities. Status at last note/certification: 51 pts Goal: Patient will report little to no difficulty with performing light desk work for 8 hours due to neck pain in order to improve ease of job tasks. Status at last note/certification: quite a bit of difficulty Goal: Patient will improve bilateral cervical lateral flexion and rotation by at least 10 deg in order to improve ease of turning head to check traffic. Status at last note/certification: lateral flexion Right 12 deg Left 15 deg, rotation Right 45 deg Left 33 deg Goal: Patient will improve bilateral average hand  strength by at least 5-10 pounds over three trials in order to improve ease of daily tasks. Status at last note/certification: Right 98 lbs, Left 70 lbs Goal: Patient will report worst neck pain as 4/10 or less in order to progress toward personal goals and reduce incidence of headaches. Status at last note/certification: 21/89 PLAN [x]  Upgrade activities as tolerated     []  Continue plan of care 
[]  Update interventions per flow sheet      
[]  Discharge due to:_ 
 []  Other:_ Annalee Chanda, PTA 4/1/2019  5:16 PM 
 
Future Appointments Date Time Provider Justino Godoy 4/3/2019  3:30 PM Ambika Jeff PT [facility]  [facility]

## 2019-04-03 ENCOUNTER — HOSPITAL ENCOUNTER (OUTPATIENT)
Dept: PHYSICAL THERAPY | Age: 64
End: 2019-04-03
Payer: COMMERCIAL

## 2019-04-10 ENCOUNTER — HOSPITAL ENCOUNTER (OUTPATIENT)
Dept: PHYSICAL THERAPY | Age: 64
Discharge: HOME | End: 2019-04-10
Payer: COMMERCIAL

## 2019-04-10 PROCEDURE — 97112 NEUROMUSCULAR REEDUCATION: CPT

## 2019-04-10 PROCEDURE — 97140 MANUAL THERAPY 1/> REGIONS: CPT

## 2019-04-10 NOTE — PROGRESS NOTES
PT DAILY TREATMENT NOTE 10-18 Patient Name: Jaden Basurto Date:4/10/2019 : 1955 [x]  Patient  Verified Payor: Aurora Jaramillo / [place]: [place] [employer] / Product Type: PPO / In time:4:20  Out time:5;05 Total Treatment Time (min): 45 Visit #: 6 of 10 Medicare/[facility] Only Total Timed Codes (min):   1:1 Treatment Time:    
 
 
Treatment Area: Neck pain [M54.2] SUBJECTIVE Pain Level (0-10 scale):  3-4 Any medication changes, allergies to medications, adverse drug reactions, diagnosis change, or new procedure performed?: [x] No    [] Yes (see summary sheet for update) Subjective functional status/changes:   [] No changes reported My shoulders are sore from lifting heavy bags OBJECTIVE 35 min Neuromuscular Re-education:  []  See flow sheet :  
Rationale: increase ROM, increase strength and improve coordination  to improve the patients ability to improve cervical stability to improve tolerance to desk work 10 min Manual Therapy:  STM, SOR to cs. Manual stretching to UT and Alyssa Mighty Rationale: decrease pain, increase ROM and increase tissue extensibility to improve ease of Mobility, reduce radicular sympotms With 
 [] TE 
 [] TA 
 [] neuro 
 [] other: Patient Education: [x] Review HEP [] Progressed/Changed HEP based on:  
[] positioning   [] body mechanics   [] transfers   [] heat/ice application   
[] other:   
 
Other Objective/Functional Measures:  
Functional Gains: neck mobility has improved. Deficits: no change in N/T in hands Pt Goal: improve symptoms in hands if possible Pain Level (0-10 scale) post treatment: 2 
 
ASSESSMENT/Changes in Function:  See goals Patient will continue to benefit from skilled PT services to modify and progress therapeutic interventions, address functional mobility deficits, address ROM deficits, address strength deficits, analyze and address soft tissue restrictions, analyze and cue movement patterns, analyze and modify body mechanics/ergonomics, assess and modify postural abnormalities, address imbalance/dizziness and instruct in home and community integration to attain remaining goals. []  See Plan of Care 
[]  See progress note/recertification 
[]  See Discharge Summary Progress towards goals / Updated goals: 
Goal: Patient will be independent and compliant with HEP in order to progress toward long term goals. Goal Met Long Term Goals: To be accomplished in 10 treatments: 
Goal: Patient will improve FOTO assessment score to 59 pts in order to indicate improved functional abilities. Status at last note/certification: 51 pts FOTO 58  Progressing Goal: Patient will report little to no difficulty with performing light desk work for 8 hours due to neck pain in order to improve ease of job tasks. Status at last note/certification: quite a bit of difficulty Minimal discomfort by end of work day. Progressing Goal: Patient will improve bilateral cervical lateral flexion and rotation by at least 10 deg in order to improve ease of turning head to check traffic. Status at last note/certification: lateral flexion Right 12 deg Left 15 deg, rotation Right 45 deg Left 33 deg Left SB 15, right SB 22, left rotation 35, right rotation 43  progressing Goal: Patient will improve bilateral average hand  strength by at least 5-10 pounds over three trials in order to improve ease of daily tasks. Status at last note/certification: Right 98 lbs, Left 70 lbs Right 88, left 65  Not met Goal: Patient will report worst neck pain as 4/10 or less in order to progress toward personal goals and reduce incidence of headaches. Status at last note/certification: 26/24 HA's have nearly resolved, 3-4/10 worst 
 
PLAN [x]  Upgrade activities as tolerated     []  Continue plan of care 
[]  Update interventions per flow sheet      
[]  Discharge due to:_ 
[]  Other:_ Hinda Canavan, PTA 4/10/2019  4:24 PM 
 
Future Appointments Date Time Provider Justino Godoy 4/12/2019 12:00 PM Roseann Alf [facility]  
4/16/2019  2:45 PM Mckinley Doherty, PT [facility]  
4/18/2019 11:15 AM [facility] [place] [facility]  
4/22/2019  4:15 PM Rock Kelly [facility]  
4/25/2019  4:15 PM Mckinley Doherty, PT [facility]  
4/30/2019  4:15 PM Kendy Garcia, PT [facility]

## 2019-04-11 NOTE — PROGRESS NOTES
[facility] [place] [facility] 
[address] [place] [address] 
[phone] fax Progress Note Patient name: Naomy Noel Start of Care: 3/8/2019 Referral source: Karan Abernathy MD : 1955 Medical Diagnosis: Neck pain [M54.2] Payor: Gisselle Canada / Plan: [place] [employer] / Product Type: PPO /  Onset Date:19 Treatment Diagnosis: neck pain Prior Hospitalization: see medical history Provider#: [id number] Medications: Verified on Patient summary List  
 Comorbidities: arthritis, high blood pressure, visual/hearing impairment Prior Level of Function: Functionally independent, lives with wife,  for [employer] [address] Visits from Start of Care: 6    Missed Visits: 1 Established Goals:          Excellent Good         Limited           None 
[x] Increased ROM   []  []  [x]  [] 
[x] Increased Strength  []  []  [x]  [] 
[x] Increased Mobility  []  [x]  []  []  
[x] Decreased Pain   []  [x]  []  [] 
[] Decreased Swelling  []  []  []  [] Goal: Patient will be independent and compliant with HEP in order to progress toward long term goals. Status at last note/certification: issued and reviewed Current status: Met Long Term Goals: To be accomplished in 10 treatments: 
Goal: Patient will improve FOTO assessment score to 59 pts in order to indicate improved functional abilities. Status at last note/certification: 51 pts Current status: Progressing, 58 pts Goal: Patient will report little to no difficulty with performing light desk work for 8 hours due to neck pain in order to improve ease of job tasks. Status at last note/certification: quite a bit of difficulty Current status: Progressing, minimal discomfort by end of work day Goal: Patient will improve bilateral cervical lateral flexion and rotation by at least 10 deg in order to improve ease of turning head to check traffic. Status at last note/certification: lateral flexion Right 12 deg Left 15 deg, rotation Right 45 deg Left 33 deg Current status: Progressing, Left lateral flexion 15 deg, right lateral flexion 22 deg, left rotation 35 deg, right rotation 43 deg Goal: Patient will improve bilateral average hand  strength by at least 5-10 pounds over three trials in order to improve ease of daily tasks. Status at last note/certification: Right 98 lbs, Left 70 lbs Current status: Not met, Right 88 lbs, left 65 lbs Goal: Patient will report worst neck pain as 4/10 or less in order to progress toward personal goals and reduce incidence of headaches. Status at last note/certification: 84/21 Current status: Met, HA's have nearly resolved, 3-4/10 worst 
 
Key Functional Changes:  
Functional Gains: neck mobility has improved per Patient Deficits: no change in N/T in hands Pt Goal: improve symptoms in hands if possible Updated Goals: to be achieved in 10 treatments: 
Goal: Patient will improve FOTO assessment score to 59 pts in order to indicate improved functional abilities. Status at last note/certification: 58 pts Goal: Patient will report at least 50% improvement in hand numbness/tingling symptoms in order to progress toward personal goals. Status at last note/certification: limited change Goal: Patient will improve bilateral cervical lateral flexion and rotation by at least 10 deg in order to improve ease of turning head to check traffic. Status at last note/certification: Left lateral flexion 15 deg, right lateral flexion 22 deg, left rotation 35 deg, right rotation 43 deg Goal: Patient will improve bilateral average hand  strength by at least 5-10 pounds over three trials in order to improve ease of daily tasks. Status at last note/certification: Right 88 lbs, left 65 lbs ASSESSMENT/RECOMMENDATIONS: 
[x]Continue therapy per initial plan/protocol at a frequency of  2 x per week for 5 weeks []Continue therapy with the following recommended changes:_____________________      _____________________________________________________________________ []Discontinue therapy progressing towards or have reached established goals []Discontinue therapy due to lack of appreciable progress towards goals []Discontinue therapy due to lack of attendance or compliance []Await Physician's recommendations/decisions regarding therapy []Other:________________________________________________________________ Thank you for this referral.  
Melissa Mckeon, PT 4/11/2019 9:01 AM 
NOTE TO PHYSICIAN:  PLEASE COMPLETE THE ORDERS BELOW AND  
FAX TO [facility]: [phone] If you are unable to process this request in 24 hours please contact our office: [phone] []  I have read the above report and request that my patient continue as recommended. []  I have read the above report and request that my patient continue therapy with the following changes/special instructions:________________________________________ []I have read the above report and request that my patient be discharged from therapy.  
 
[de-identified] Signature:____________Date:_________TIME:________ 
 
[employer], Date and Time must be completed for valid certification **

## 2019-04-12 ENCOUNTER — HOSPITAL ENCOUNTER (OUTPATIENT)
Dept: PHYSICAL THERAPY | Age: 64
Discharge: HOME | End: 2019-04-12
Payer: COMMERCIAL

## 2019-04-12 PROCEDURE — 97140 MANUAL THERAPY 1/> REGIONS: CPT

## 2019-04-12 PROCEDURE — 97112 NEUROMUSCULAR REEDUCATION: CPT

## 2019-04-12 NOTE — PROGRESS NOTES
PT DAILY TREATMENT NOTE 10-18 Patient Name: Maine Amor Date:2019 : 1955 [x]  Patient  Verified Payor: Tim Hassan / [place]: [place] [employer] PPO / Product Type: PPO / In time:12:00  Out time:12:35 Total Treatment Time (min): 35 Visit #: 7 of 10 Medicare/BCBS Only Total Timed Codes (min):   1:1 Treatment Time:    
 
 
Treatment Area: Neck pain [M54.2] SUBJECTIVE Pain Level (0-10 scale): 2 Any medication changes, allergies to medications, adverse drug reactions, diagnosis change, or new procedure performed?: [x] No    [] Yes (see summary sheet for update) Subjective functional status/changes:   [] No changes reported I have pain on my left side when I turn my head to both the left and the right. OBJECTIVE 25 min Neuromuscular Re-education:  []  See flow sheet :  
Rationale: increase ROM, increase strength and improve coordination  to improve the patients ability to improve cervical stability to improve tolerance to desk work 10 min Manual Therapy:  STM, SOR to cs, manual stretching to UT and lev scap. Rationale: decrease pain, increase ROM and increase tissue extensibility to improve mobility and decrease radicular symptoms. With 
 [] TE 
 [] TA 
 [] neuro 
 [] other: Patient Education: [x] Review HEP [] Progressed/Changed HEP based on:  
[] positioning   [] body mechanics   [] transfers   [] heat/ice application   
[] other:   
 
Other Objective/Functional Measures:   
 
Pain Level (0-10 scale) post treatment: 2 \"looser\" ASSESSMENT/Changes in Function:  
Patient able to complete all exercises with no increase in symptoms. Palpable trigger points in UT.  Pt describes more mobility after MT  
 
 Patient will continue to benefit from skilled PT services to modify and progress therapeutic interventions, address functional mobility deficits, address ROM deficits, address strength deficits, analyze and address soft tissue restrictions, analyze and cue movement patterns, analyze and modify body mechanics/ergonomics, assess and modify postural abnormalities, address imbalance/dizziness and instruct in home and community integration to attain remaining goals. []  See Plan of Care 
[]  See progress note/recertification 
[]  See Discharge Summary Progress towards goals / Updated goals: 
Goal: Patient will be independent and compliant with HEP in order to progress toward long term goals. Goal Met Long Term Goals: To be accomplished in 10 treatments: 
Goal: Patient will improve FOTO assessment score to 59 pts in order to indicate improved functional abilities. Status at last note/certification: 51 pts FOTO 58  Progressing Goal: Patient will report little to no difficulty with performing light desk work for 8 hours due to neck pain in order to improve ease of job tasks. Status at last note/certification: quite a bit of difficulty Minimal discomfort by end of work day. Progressing Goal: Patient will improve bilateral cervical lateral flexion and rotation by at least 10 deg in order to improve ease of turning head to check traffic. Status at last note/certification: lateral flexion Right 12 deg Left 15 deg, rotation Right 45 deg Left 33 deg Left SB 15, right SB 22, left rotation 35, right rotation 43  progressing Goal: Patient will improve bilateral average hand  strength by at least 5-10 pounds over three trials in order to improve ease of daily tasks. Status at last note/certification: Right 98 lbs, Left 70 lbs Right 88, left 65  Not met Goal: Patient will report worst neck pain as 4/10 or less in order to progress toward personal goals and reduce incidence of headaches. Status at last note/certification: 35/58 HA's have nearly resolved, 3-4/10 worst 
 
PLAN [x]  Upgrade activities as tolerated     []  Continue plan of care 
[]  Update interventions per flow sheet      
[]  Discharge due to:_ 
[]  Other:_ Chuy Zaman, PTA 4/12/2019  12:36 PM 
 
Future Appointments Date Time Provider Justino Godoy 4/16/2019  2:45 PM [facility]  
4/18/2019 11:15 AM Madison Parkinson [facility]  
4/22/2019  4:15 PM Francisco Fortune [facility]  
4/25/2019  4:15 PM Kathie Beebe, PT [facility]  
4/30/2019  4:15 PM Laron Garcia, PT [facility]

## 2019-04-16 ENCOUNTER — HOSPITAL ENCOUNTER (OUTPATIENT)
Dept: PHYSICAL THERAPY | Age: 64
Discharge: HOME | End: 2019-04-16
Payer: COMMERCIAL

## 2019-04-16 PROCEDURE — 97112 NEUROMUSCULAR REEDUCATION: CPT

## 2019-04-16 PROCEDURE — 97140 MANUAL THERAPY 1/> REGIONS: CPT

## 2019-04-16 NOTE — PROGRESS NOTES
PT DAILY TREATMENT NOTE 10-18 Patient Name: Jeri Cut Off Date:2019 : 1955 [x]  Patient  Verified Payor: Stefany Herrera / [place]: [place] [employer] PPO / Product Type: PPO / In time:2:50  Out time:3:30 Total Treatment Time (min): 40 Visit #: 7 of 10 Medicare/[facility] Only Total Timed Codes (min):   1:1 Treatment Time:    
 
 
Treatment Area: Neck pain [M54.2] SUBJECTIVE Pain Level (0-10 scale): 2-3/10 Any medication changes, allergies to medications, adverse drug reactions, diagnosis change, or new procedure performed?: [x] No    [] Yes (see summary sheet for update) Subjective functional status/changes:   [] No changes reported \"I'm always a little sore. I feel it at the base of my neck and into my shoulder blades. \" OBJECTIVE 23 min Neuromuscular Re-education:  []  See flow sheet :  
Rationale: increase ROM, increase strength and improve coordination  to improve the patients ability to improve cervical stability to improve tolerance to desk work 17 min Manual Therapy:  STM, SOR to cs, manual stretching to UT and lev scap. Rationale: decrease pain, increase ROM and increase tissue extensibility to improve mobility and decrease radicular symptoms. With 
 [] TE 
 [] TA 
 [] neuro 
 [] other: Patient Education: [x] Review HEP [] Progressed/Changed HEP based on:  
[] positioning   [] body mechanics   [] transfers   [] heat/ice application   
[] other:   
 
Other Objective/Functional Measures: Increased to 2 red springs for seated pull downs. Pain Level (0-10 scale) post treatment: 2/10 ASSESSMENT/Changes in Function: Pt continues to report tightness and discomfort in lower neck when turning head right to left and mild discomfort with extending head/neck in available range. Pt inquired about stretches to help with the lingering tightness in lower neck and shoulder blades, so showed rhomboid stretch with round foam roll, levator scapulae stretch, and lower cervical, upper thoracic stretch. Pt return demonstrated 100% understanding. Patient will continue to benefit from skilled PT services to modify and progress therapeutic interventions, address functional mobility deficits, address ROM deficits, address strength deficits, analyze and address soft tissue restrictions, analyze and cue movement patterns, analyze and modify body mechanics/ergonomics, assess and modify postural abnormalities, address imbalance/dizziness and instruct in home and community integration to attain remaining goals. []  See Plan of Care 
[]  See progress note/recertification 
[]  See Discharge Summary Progress towards goals / Updated goals: 
Goal: Patient will improve FOTO assessment score to 59 pts in order to indicate improved functional abilities. Status at last note/certification: 58 pts Current status: 
Goal: Patient will report at least 50% improvement in hand numbness/tingling symptoms in order to progress toward personal goals. Status at last note/certification: limited change Current status: 
Goal: Patient will improve bilateral cervical lateral flexion and rotation by at least 10 deg in order to improve ease of turning head to check traffic. Status at last note/certification: Left lateral flexion 15 deg, right lateral flexion 22 deg, left rotation 35 deg, right rotation 43 deg Current status: 
Goal: Patient will improve bilateral average hand  strength by at least 5-10 pounds over three trials in order to improve ease of daily tasks. Status at last note/certification: Right 88 lbs, left 65 lbs Current status: PLAN [x]  Upgrade activities as tolerated     []  Continue plan of care 
[]  Update interventions per flow sheet      
[]  Discharge due to:_ 
[]  Other:_   
 
Woody Garcia, PT 4/16/2019  12:36 PM 
 
Future Appointments Date Time Provider Justino Godoy 4/18/2019 11:15 AM Jd Sharpe [facility]  
4/22/2019  4:15 PM Judi Ferrell [facility]  
4/25/2019  4:15 PM Ervin Miller, PT [facility] [place] [facility]  
4/30/2019  4:15 PM Woody Garcia, PT [facility]

## 2019-04-18 ENCOUNTER — HOSPITAL ENCOUNTER (OUTPATIENT)
Dept: PHYSICAL THERAPY | Age: 64
Discharge: HOME | End: 2019-04-18
Payer: COMMERCIAL

## 2019-04-18 PROCEDURE — 97140 MANUAL THERAPY 1/> REGIONS: CPT

## 2019-04-18 PROCEDURE — 97112 NEUROMUSCULAR REEDUCATION: CPT

## 2019-04-18 NOTE — PROGRESS NOTES
PT DAILY TREATMENT NOTE 10-18 Patient Name: Guy Clock Date:2019 : 1955 [x]  Patient  Verified Payor: Carlyn Samantha / [place]: [place] [employer] / Product Type: PPO / In time:2:45  Out time:3:28 Total Treatment Time (min): 43 Visit #: 3 of 10 Medicare/BCBS Only Total Timed Codes (min):   1:1 Treatment Time:    
 
 
Treatment Area: Neck pain [M54.2] SUBJECTIVE Pain Level (0-10 scale): 2/10 Any medication changes, allergies to medications, adverse drug reactions, diagnosis change, or new procedure performed?: [x] No    [] Yes (see summary sheet for update) Subjective functional status/changes:   [] No changes reported \"I'm a little sore through the neck and shoulder blades. My hands feel like I have sand under my fingernails and like sandpaper in general.\" OBJECTIVE 26 min Neuromuscular Re-education:  []  See flow sheet :  
Rationale: increase ROM, increase strength and improve coordination  to improve the patients ability to improve cervical stability to improve tolerance to desk work 17 min Manual Therapy:  SOR, STM to B UT, C/S paraspinals; Prone MFR level II to rhomboids Rationale: decrease pain, increase ROM and increase tissue extensibility to improve mobility and decrease radicular symptoms. With 
 [] TE 
 [] TA 
 [] neuro 
 [] other: Patient Education: [x] Review HEP [] Progressed/Changed HEP based on:  
[] positioning   [] body mechanics   [] transfers   [] heat/ice application   
[] other:   
 
Other Objective/Functional Measures: Continued with Rx program per flow sheet. Pain Level (0-10 scale) post treatment: 2/10 ASSESSMENT/Changes in Function: Pt able to report improved mobility in neck and shoulder blades after manual therapy today. Pt notes no change in symptoms in B hands. Patient will continue to benefit from skilled PT services to modify and progress therapeutic interventions, address functional mobility deficits, address ROM deficits, address strength deficits, analyze and address soft tissue restrictions, analyze and cue movement patterns, analyze and modify body mechanics/ergonomics, assess and modify postural abnormalities, address imbalance/dizziness and instruct in home and community integration to attain remaining goals. []  See Plan of Care 
[]  See progress note/recertification 
[]  See Discharge Summary Progress towards goals / Updated goals: 
Goal: Patient will improve FOTO assessment score to 59 pts in order to indicate improved functional abilities. Status at last note/certification: 58 pts Current status: reassess at MD note Goal: Patient will report at least 50% improvement in hand numbness/tingling symptoms in order to progress toward personal goals. Status at last note/certification: limited change Current status: not met - limited change Goal: Patient will improve bilateral cervical lateral flexion and rotation by at least 10 deg in order to improve ease of turning head to check traffic. Status at last note/certification: Left lateral flexion 15 deg, right lateral flexion 22 deg, left rotation 35 deg, right rotation 43 deg Current status: reassess next visit Goal: Patient will improve bilateral average hand  strength by at least 5-10 pounds over three trials in order to improve ease of daily tasks. Status at last note/certification: Right 88 lbs, left 65 lbs Current status: reassess next visit PLAN [x]  Upgrade activities as tolerated     []  Continue plan of care 
[]  Update interventions per flow sheet      
[]  Discharge due to:_ 
[]  Other:_   
 
Buddy Garcia, PT 4/18/2019  12:36 PM 
 
Future Appointments Date Time Provider Justino Godoy 4/22/2019  4:15 PM [place], [address]  
 4/25/2019  4:15 PM Mag Cortes, PT [facility]  
4/30/2019  4:15 PM Marianne Garcia, PT [facility]

## 2019-04-22 ENCOUNTER — HOSPITAL ENCOUNTER (OUTPATIENT)
Dept: PHYSICAL THERAPY | Age: 64
Discharge: HOME | End: 2019-04-22
Payer: COMMERCIAL

## 2019-04-22 PROCEDURE — 97112 NEUROMUSCULAR REEDUCATION: CPT

## 2019-04-22 PROCEDURE — 97140 MANUAL THERAPY 1/> REGIONS: CPT

## 2019-04-22 NOTE — PROGRESS NOTES
PT DAILY TREATMENT NOTE 10-18 Patient Name: Ag Mckeon Date:2019 : 1955 [x]  Patient  Verified Payor: Coleen Peter / [place]: [place] [employer] [place] / Product Type: PPO / In time:4:15  Out time:4:55 Total Treatment Time (min): 40 Visit #: 4 of 10 Medicare/BCBS Only Total Timed Codes (min):   1:1 Treatment Time:    
 
 
Treatment Area: Neck pain [M54.2] SUBJECTIVE Pain Level (0-10 scale): 2 Any medication changes, allergies to medications, adverse drug reactions, diagnosis change, or new procedure performed?: [x] No    [] Yes (see summary sheet for update) Subjective functional status/changes:   [] No changes reported I feel a little stiff today. OBJECTIVE 30 min Neuromuscular Re-education:  []  See flow sheet :  
Rationale: increase ROM, increase strength and improve coordination  to improve the patients ability to improve cervical stability to improve tolerance to desk work. 10 Min Manual Therapy:  SOR, STM to B UT, C/S paraspinals Rationale: decrease pain, increase ROM and increase tissue extensibility to improve mobility and decrease radicular symptoms. With 
 [] TE 
 [] TA 
 [] neuro 
 [] other: Patient Education: [x] Review HEP [] Progressed/Changed HEP based on:  
[] positioning   [] body mechanics   [] transfers   [] heat/ice application   
[] other:   
 
Other Objective/Functional Measures:   
 
Pain Level (0-10 scale) post treatment: 2 
 
ASSESSMENT/Changes in Function:  
Patient demonstrates a slight gain in ROM and  strength, but reports no change in radicular symptoms in left UE. He states that he has slight worsening of symptoms in the right UE especially at night. Patient will continue to benefit from skilled PT services to modify and progress therapeutic interventions, address functional mobility deficits, address ROM deficits, address strength deficits, analyze and address soft tissue restrictions, analyze and cue movement patterns, analyze and modify body mechanics/ergonomics, assess and modify postural abnormalities, address imbalance/dizziness and instruct in home and community integration to attain remaining goals. []  See Plan of Care 
[]  See progress note/recertification 
[]  See Discharge Summary Progress towards goals / Updated goals: 
Goal: Patient will improve FOTO assessment score to 59 pts in order to indicate improved functional abilities. Status at last note/certification: 58 pts Current status: reassess at MD note Goal: Patient will report at least 50% improvement in hand numbness/tingling symptoms in order to progress toward personal goals. Status at last note/certification: limited change Current status: not met - limited change Goal: Patient will improve bilateral cervical lateral flexion and rotation by at least 10 deg in order to improve ease of turning head to check traffic. Status at last note/certification: Left lateral flexion 15 deg, right lateral flexion 22 deg, left rotation 35 deg, right rotation 43 deg Current status: Progressing: right lateral flexion 31 deg, left lateral flexion 20 deg, left rotation 30 deg, right rotation 30 deg Goal: Patient will improve bilateral average hand  strength by at least 5-10 pounds over three trials in order to improve ease of daily tasks. Status at last note/certification: Right 88 lbs, left 65 lbs Current status: Progressing: right 90 lbs, left 80 lbs PLAN [x]  Upgrade activities as tolerated     []  Continue plan of care 
[]  Update interventions per flow sheet      
[]  Discharge due to:_ 
[]  Other:_ Corry Jama, PTA 4/22/2019  4:18 PM 
 
Future Appointments Date Time Provider Justino Jayne 4/25/2019  4:15 PM Junior Winkler, PT [facility]  
4/30/2019  4:15 PM Emilia Garcia, PT [facility]

## 2019-04-25 ENCOUNTER — APPOINTMENT (OUTPATIENT)
Dept: PHYSICAL THERAPY | Age: 64
End: 2019-04-25
Payer: COMMERCIAL

## 2019-04-30 ENCOUNTER — HOSPITAL ENCOUNTER (OUTPATIENT)
Dept: PHYSICAL THERAPY | Age: 64
Discharge: HOME | End: 2019-04-30
Payer: COMMERCIAL

## 2019-04-30 PROCEDURE — 97140 MANUAL THERAPY 1/> REGIONS: CPT

## 2019-04-30 PROCEDURE — 97112 NEUROMUSCULAR REEDUCATION: CPT

## 2019-04-30 NOTE — PROGRESS NOTES
PT DAILY TREATMENT NOTE 10-18 Patient Name: Coretta Atkinson Date:2019 : 1955 [x]  Patient  Verified Payor: Phuong Chandra / [place]: [place] [employer] [place] / Product Type: PPO / In time:4:20  Out time:5:00 Total Treatment Time (min): 40 Visit #: 5 of 10 Medicare/BCBS Only Total Timed Codes (min):   1:1 Treatment Time:    
 
 
Treatment Area: Neck pain [M54.2] SUBJECTIVE Pain Level (0-10 scale): 2/10 Any medication changes, allergies to medications, adverse drug reactions, diagnosis change, or new procedure performed?: [x] No    [] Yes (see summary sheet for update) Subjective functional status/changes:   [] No changes reported \"I am sore. I went to the MD and I was told to stay away from the door stretches to not aggravate my shoulder that is already hurt. \" OBJECTIVE 25 min Neuromuscular Re-education:  []  See flow sheet :  
Rationale: increase ROM, increase strength and improve coordination  to improve the patients ability to improve cervical stability to improve tolerance to desk work. 15 Min Manual Therapy:  SOR, STM to B UT, C/S paraspinals Rationale: decrease pain, increase ROM and increase tissue extensibility to improve mobility and decrease radicular symptoms. With 
 [] TE 
 [] TA 
 [] neuro 
 [] other: Patient Education: [x] Review HEP [] Progressed/Changed HEP based on:  
[] positioning   [] body mechanics   [] transfers   [] heat/ice application   
[] other:   
 
Other Objective/Functional Measures: Continued with Rx program per flow sheet. Pain Level (0-10 scale) post treatment: 2/10 ASSESSMENT/Changes in Function:  Pt reports sustained traction and manual UT stretching seem to help the most.  Explained to Pt mechanical traction contraindicated due to fusion but can focus on this with manual interventions. Patient will continue to benefit from skilled PT services to modify and progress therapeutic interventions, address functional mobility deficits, address ROM deficits, address strength deficits, analyze and address soft tissue restrictions, analyze and cue movement patterns, analyze and modify body mechanics/ergonomics, assess and modify postural abnormalities, address imbalance/dizziness and instruct in home and community integration to attain remaining goals. []  See Plan of Care 
[]  See progress note/recertification 
[]  See Discharge Summary Progress towards goals / Updated goals: 
Goal: Patient will improve FOTO assessment score to 59 pts in order to indicate improved functional abilities. Status at last note/certification: 58 pts Current status: reassess at MD note Goal: Patient will report at least 50% improvement in hand numbness/tingling symptoms in order to progress toward personal goals. Status at last note/certification: limited change Current status: not met - limited change Goal: Patient will improve bilateral cervical lateral flexion and rotation by at least 10 deg in order to improve ease of turning head to check traffic. Status at last note/certification: Left lateral flexion 15 deg, right lateral flexion 22 deg, left rotation 35 deg, right rotation 43 deg Current status: Progressing: right lateral flexion 31 deg, left lateral flexion 20 deg, left rotation 30 deg, right rotation 30 deg Goal: Patient will improve bilateral average hand  strength by at least 5-10 pounds over three trials in order to improve ease of daily tasks. Status at last note/certification: Right 88 lbs, left 65 lbs Current status: Progressing: right 90 lbs, left 80 lbs PLAN [x]  Upgrade activities as tolerated     []  Continue plan of care 
[]  Update interventions per flow sheet      
[]  Discharge due to:_ 
[]  Other:_   
 
Kyle Garcia, PT 4/30/2019  4:18 PM 
 
No future appointments.

## 2019-05-13 ENCOUNTER — HOSPITAL ENCOUNTER (OUTPATIENT)
Dept: PHYSICAL THERAPY | Age: 64
Discharge: HOME | End: 2019-05-13
Payer: COMMERCIAL

## 2019-05-13 PROCEDURE — 97140 MANUAL THERAPY 1/> REGIONS: CPT

## 2019-05-13 NOTE — PROGRESS NOTES
PT DAILY TREATMENT NOTE 10-18 Patient Name: Amy Lucio Date:2019 : 1955 [x]  Patient  Verified Payor: Sanam Ann / Plan: [place] [employer] PPO / Product Type: PPO / In time:4;30  Out time: 4;55 Total Treatment Time (min): 45 Visit #: 6 of 10 Medicare/[facility] Only Total Timed Codes (min):   1:1 Treatment Time:    
 
 
Treatment Area: Neck pain [M54.2] SUBJECTIVE Pain Level (0-10 scale): 0 Any medication changes, allergies to medications, adverse drug reactions, diagnosis change, or new procedure performed?: [x] No    [] Yes (see summary sheet for update) Subjective functional status/changes:   [] No changes reported I had injections in my shoulders today. The MD said don't do much with them in regards to exercise. So If I can just get the manual that will be OK OBJECTIVE 25 min Manual Therapy:  STM/ SOR to cs. TPR to splenii, middle and anterior scalenes. Prone STM to thoracic PS, rhomboids Rationale: decrease pain, increase ROM and decrease trigger points to improve ease of function, reduce c/o pain. Reduce inhibition of postural muscles With 
 [] TE 
 [] TA 
 [] neuro 
 [] other: Patient Education: [x] Review HEP [] Progressed/Changed HEP based on:  
[] positioning   [] body mechanics   [] transfers   [] heat/ice application   
[] other:   
 
Other Objective/Functional Measures: held exercises due to injections in shoulders Pain Level (0-10 scale) post treatment: 2 
 
ASSESSMENT/Changes in Function: reports that his pain is mild mostly, but the tightness persists. Patient will continue to benefit from skilled PT services to modify and progress therapeutic interventions, address functional mobility deficits, address ROM deficits, address strength deficits, analyze and address soft tissue restrictions, analyze and cue movement patterns, analyze and modify body mechanics/ergonomics, assess and modify postural abnormalities, address imbalance/dizziness and instruct in home and community integration to attain remaining goals. []  See Plan of Care 
[]  See progress note/recertification 
[]  See Discharge Summary Progress towards goals / Updated goals: 
Goal: Patient will improve FOTO assessment score to 59 pts in order to indicate improved functional abilities. Status at last note/certification: 58 pts Current status: reassess at MD note Goal: Patient will report at least 50% improvement in hand numbness/tingling symptoms in order to progress toward personal goals. Status at last note/certification: limited change Current status: not met - limited change Goal: Patient will improve bilateral cervical lateral flexion and rotation by at least 10 deg in order to improve ease of turning head to check traffic. Status at last note/certification: Left lateral flexion 15 deg, right lateral flexion 22 deg, left rotation 35 deg, right rotation 43 deg Current status: Progressing: right lateral flexion 31 deg, left lateral flexion 20 deg, left rotation 30 deg, right rotation 30 deg Goal: Patient will improve bilateral average hand  strength by at least 5-10 pounds over three trials in order to improve ease of daily tasks. Status at last note/certification: Right 88 lbs, left 65 lbs Current status: Progressing: right 90 lbs, left 80 lbs PLAN [x]  Upgrade activities as tolerated     []  Continue plan of care 
[]  Update interventions per flow sheet      
[]  Discharge due to:_ 
[]  Other:_ Nara Leal, PTA 5/13/2019  5:15 PM 
 
Future Appointments Date Time Provider Justino Godoy 5/15/2019  4:15 PM [place] [facility]  
5/20/2019  4:15 PM Kendrick Briggs [facility]  
5/22/2019  4:15 PM Kendrick Briggs [facility]  
5/29/2019  4:15 PM Morgan Garcia

## 2019-05-22 ENCOUNTER — APPOINTMENT (OUTPATIENT)
Dept: PHYSICAL THERAPY | Age: 64
End: 2019-05-22
Payer: COMMERCIAL

## 2019-05-23 NOTE — PROGRESS NOTES
[facility], [address] [place] [address]  [phone] fax    Discharge Summary    Patient name: Goyo Pham Start of Care: 3/8/2019   Referral Mary Sanderson MD : 1955                Medical Diagnosis: Neck pain [M54.2]  Payor: Abdirizak Ramirez / Plan: [place] [employer] PPO / Product Type: PPO /  Onset Date:19                Treatment Diagnosis: neck pain   Prior Hospitalization: see medical history Provider#: [id number]   Medications: Verified on Patient summary List    Comorbidities: arthritis, high blood pressure, visual/hearing impairment   Prior Level of Function: Functionally independent, lives with wife,  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 Status at last note/certification: 58 pts  Current status: not met - unable to reassess  Goal: Patient will report at least 50% improvement in hand numbness/tingling symptoms in order to progress toward personal goals. Status at last note/certification: limited change  Current status: not met - limited change  Goal: Patient will improve bilateral cervical lateral flexion and rotation by at least 10 deg in order to improve ease of turning head to check traffic. Status at last note/certification: Left lateral flexion 15 deg, right lateral flexion 22 deg, left rotation 35 deg, right rotation 43 deg  Current status: progressing: right lateral flexion 31 deg, left lateral flexion 20 deg, left rotation 30 deg, right rotation 30 deg  Goal: Patient will improve bilateral average hand  strength by at least 5-10 pounds over three trials in order to improve ease of daily tasks.   Status at last note/certification: Right 88 lbs, left 65 lbs  Current status: progressing: right 90 lbs, left 80 lbs    Assessment/ Summary of Care: Pt made good progress with skilled therapy, reporting 1-2/10 pain on average at each session. Pt continued to note neck stiffness, tightness that affected ADLs. Pt stopped attending sessions after 5/13/19, due to busy work schedule and requested to be D/C. Pt's goals were unable to be completely updated.   Thank you for this referral.    RECOMMENDATIONS:  [x]Discontinue therapy: []Patient has reached or is progressing toward set goals      [x]Patient is non-compliant or has abdicated      []Due to lack of appreciable progress towards set goals    Amanda Garcia, PT 5/23/2019 1:46 PM

## 2019-08-05 ENCOUNTER — HOSPITAL ENCOUNTER (OUTPATIENT)
Age: 64
Discharge: HOME | End: 2019-08-05
Attending: ORTHOPAEDIC SURGERY
Payer: COMMERCIAL

## 2019-08-05 DIAGNOSIS — M75.42 SHOULDER IMPINGEMENT SYNDROME, LEFT: ICD-10-CM

## 2019-08-05 DIAGNOSIS — M25.512 SHOULDER PAIN, LEFT: ICD-10-CM

## 2019-08-05 PROCEDURE — 73221 MRI JOINT UPR EXTREM W/O DYE: CPT

## 2020-01-21 ENCOUNTER — OFFICE VISIT (OUTPATIENT)
Dept: CARDIOLOGY CLINIC | Age: 65
End: 2020-01-21

## 2020-01-21 VITALS
BODY MASS INDEX: 37.25 KG/M2 | DIASTOLIC BLOOD PRESSURE: 90 MMHG | HEART RATE: 71 BPM | HEIGHT: 72 IN | SYSTOLIC BLOOD PRESSURE: 134 MMHG | OXYGEN SATURATION: 97 % | WEIGHT: 275 LBS

## 2020-01-21 DIAGNOSIS — I10 PRIMARY HYPERTENSION: Primary | ICD-10-CM

## 2020-01-21 NOTE — PROGRESS NOTES
HISTORY OF PRESENT ILLNESS  Naomy Noel is a 59 y.o. male. ASSESSMENT and PLAN    Mr. Tad Hanley has history of possible VSD noted on an echocardiogram back in 2011. He has had 2 subsequent echocardiographic evaluations since then. Both of the follow-up echocardiogram did not show any obvious VSD; the eccentric jet is more likely consistent with eccentric aortic insufficiency and/or pulmonic insufficiency. There is no evidence of significant pulmonary hypertension or RV dysfunction. He has no significant CAD. He did undergo coronary angiography back in 2002 which did not show any significant epicardial occlusive disease. In March of 2015, he had an episode of malaise, increased dyspnea, chest pain, and palpitations. He was sitting at his desk when all these symptoms occurred. The stress echocardiography did not reveal any significant abnormalities. Back in spring 2018, he had a syncopal episode in the middle of night when he got up to use the bathroom. Evaluation did not reveal any significant dysrhythmia as the etiology. It was felt that this was a vasovagal event, or possibly orthostasis. He fractured his cervical spine. In July 2018, he had cervical fusion performed involving C5 and C6 done at [facility]. He still has difficulty with paresthesia involving all 10 of his fingers. Prior to his surgery, he only had paresthesia involving the C6 distribution. · CAD:    He has no history of documented CAD. His coronary angiography in 2002 was unremarkable. · BP:    Upper normal; I advised him that weight loss would be better than adjusting medications. · HR:    Stable. · CHF:   There is no evidence of decompensated CHF noted. · Weight:    His weight today is 275 pounds. His baseline weight is 275 pounds. Again, I advised him to try to lose 20 to 30 pounds. · Cholesterol:   Target LDL <90. Remains on Lipitor 40.       I will see him back annually. Thank you.     Encounter Diagnoses   Name Primary?  Primary hypertension Yes     current treatment plan is effective, no change in therapy  lab results and schedule of future lab studies reviewed with patient  reviewed diet, exercise and weight control  cardiovascular risk and specific lipid/LDL goals reviewed  reviewed medications and side effects in detail  use of aspirin to prevent MI and TIA's discussed        HPI   Today, Mr. Mini Chavez has no complaints of chest pains, increased shortness of breath or decreased exercise capacity. He denies any orthopnea or PND. He denies any palpitations or dizziness. He does not exercise regularly but remains active physically with [employer] work. He is on his feet all the time. He has not noted any significant swelling. Review of Systems   Respiratory: Negative for shortness of breath. Cardiovascular: Negative for chest pain, palpitations, orthopnea, claudication, leg swelling and PND. All other systems reviewed and are negative. Physical Exam   Constitutional: He is oriented to person, place, and time. He appears well-developed and well-nourished. HENT:   Head: Normocephalic. Eyes: Conjunctivae are normal.   Neck: Neck supple. No JVD present. No thyromegaly present. Cardiovascular: Normal rate and regular rhythm. Pulmonary/Chest: Breath sounds normal.   Abdominal: Bowel sounds are normal.   Musculoskeletal:         General: No edema. Neurological: He is alert and oriented to person, place, and time. Skin: Skin is warm and dry. Nursing note and vitals reviewed. PCP: Vikas Mendenhall MD    Past Medical History:   Diagnosis Date    Anxiety     Arthritis     between C4 and C5    Basal cell carcinoma     s/p resection    Carotid duplex 02/04/2013    No stenosis >49% bilaterally.       Carpal tunnel syndrome     Chronic LE edema 07/19/2011    dopplers negative 7/11    Diabetes ([address] 05/2016    fbs>125 x2    Dyslipidemia     Fatty liver on Us 1/02     Fib-4 was 0.43 from 9/14    GERD (gastroesophageal reflux disease)     History of myocardial perfusion scan 01/21/2013    Mild fixed basal inferior, mid inferior, inferoapical defect likely artifact, but inrafction in RCA not entirely excluded. No ischemia. EF 53%. No WMA. Neg EKG on max EST. Ex time 8 min 15 sec.  Hypertension     Hypovitaminosis D     Low serum testosterone level 2012    negative w/u Dr. Alireza Wisdom Morbid obesity ([address]     peak weight 286 lbs, bmi 38.8 from 7/11; intol qsymia, wellbutrin    Normal stress echo 03/31/2015    Normal study after maximal exercise. No symptoms. EF 60%. Ex time 9 min.  Prediabetes     Rhinophyma     S/P cardiac cath 01/22/2002    Patent coronary arteries. LVEDP 12. EF 60%.  Sinus bradycardia     Asymptomatic in 2011    Sleep apnea mild Dr. [place] N[place] 2011     AHI 2.3, minimum desats 83% 2011 Dr Sadaf Douglas Vasovagal syncope 02/2018    VSD (ventricular septal defect) 2011    Noted on echo, with flow noted between the aorta near the aortic valve and the RV outflow tract/proximal main PA. This can be consistent with a small outlet VSD    Zoster     3 prior episodes in right CN5 distribution? suspect hsv instead       Past Surgical History:   Procedure Laterality Date    CARDIAC SURG PROCEDURE UNLIST  02/2018    echo nl lv, ef 60%, no wma, no valvular disease, no change 5/13    HX COLONOSCOPY      Dr. Jewel Bañuelos 2007 negative; 8/22/17 divertics    HX HEART CATHETERIZATION  1/22/02    The right coronary artery is large and dominant. It is widely patent. The left main coronary artery is widely patent. The left anterior descending coronary artery is widely patent. The circumflex coronary artery is widely patent. LVEDP is 12 mmHg. Overall left ventricular systolic function is within normal limits with an EF of 60%. No significant MR or AS is noted.    [address]    surgery for deviated septum    HX ORTHOPAEDIC Right 1976    knee cartilage removed right knee    HX ORTHOPAEDIC Left     CTR    HX ORTHOPAEDIC      Bilateral hip replacement Dr Randall Jeter Right     trigger release 4th finger hand    HX VASECTOMY      VASCULAR SURGERY PROCEDURE UNLIST      renal artery dopplers neg       Current Outpatient Medications   Medication Sig Dispense Refill    amLODIPine (NORVASC) 10 mg tablet TAKE ONE TABLET BY MOUTH DAILY 90 Tab 0    benazepril (LOTENSIN) 40 mg tablet Take 2 Tabs by mouth daily. 180 Tab 3    furosemide (LASIX) 40 mg tablet TAKE ONE TABLET BY MOUTH DAILY 90 Tab 3    metOLazone (ZAROXOLYN) 2.5 mg tablet Take 1 Tab by mouth daily as needed. 30 Tab 5    atorvastatin (LIPITOR) 40 mg tablet TAKE ONE TABLET BY MOUTH DAILY 90 Tab 2    PARoxetine (PAXIL) 20 mg tablet TAKE ONE TABLET BY MOUTH DAILY 30 Tab 9    metFORMIN ER (GLUCOPHAGE XR) 500 mg tablet TAKE ONE TABLET BY MOUTH DAILY WITH DINNER 30 Tab 9    OTHER PT/OT for B/L UE 2-3 times week. Diagnosis: B/L UE radiculopathy 1 Each 0    valACYclovir (VALTREX) 1 gram tablet TAKE ONE TABLET BY MOUTH THREE TIMES A DAY 21 Tab 0    Dexlansoprazole (DEXILANT) 60 mg CpDM Take  by mouth daily.   mg tablet TAKE ONE TABLET BY MOUTH EVERY 8 HOURS AS NEEDED FOR PAIN WITH MEALS 270 Tab 0    gabapentin (NEURONTIN) 300 mg capsule Take 1 Cap by mouth three (3) times daily. 90 Cap 0       The patient has a family history of    Social History     Tobacco Use    Smoking status: Former Smoker     Packs/day: 1.00     Years: 10.00     Pack years: 10.00     Last attempt to quit: 1987     Years since quittin.1    Smokeless tobacco: Never Used   Substance Use Topics    Alcohol use:  Yes     Alcohol/week: 1.7 standard drinks     Types: 2 Standard drinks or equivalent per week     Comment: social weekly    Drug use: No       Lab Results   Component Value Date/Time    Cholesterol, total 113 2016 10:35 PM    HDL Cholesterol 36 (L) 2016 10:35 PM    LDL, calculated 60 09/19/2016 10:35 PM    Triglyceride 86 09/19/2016 10:35 PM    CHOL/HDL Ratio 5.3 (H) 12/29/2009 07:49 AM        BP Readings from Last 3 Encounters:   01/21/20 134/90   03/26/19 153/62   01/03/19 148/86        Pulse Readings from Last 3 Encounters:   01/21/20 71   03/26/19 66   01/03/19 69       Wt Readings from Last 3 Encounters:   01/21/20 124.7 kg (275 lb)   01/03/19 123.8 kg (273 lb)   09/19/18 126.1 kg (278 lb)         EKG: normal EKG, normal sinus rhythm, unchanged from previous tracings.

## 2020-01-21 NOTE — PROGRESS NOTES
Florence Medina presents today for   Chief Complaint   Patient presents with    Hypertension     1 follow up       Florence Medina preferred language for health care discussion is english/other. Is someone accompanying this pt? no    Is the patient using any DME equipment during [address]? no    Depression Screening:  3 most recent PHQ Screens 1/21/2020   Little interest or pleasure in doing things Not at all   Feeling down, depressed, irritable, or hopeless Not at all   Total Score PHQ 2 0       Learning Assessment:  Learning Assessment 1/21/2020   PRIMARY LEARNER Patient   HIGHEST LEVEL OF EDUCATION - PRIMARY LEARNER  GRADUATED HIGH SCHOOL OR GED   BARRIERS PRIMARY LEARNER NONE   CO-LEARNER CAREGIVER No   CO-LEARNER NAME na   PRIMARY LANGUAGE ENGLISH   LEARNER PREFERENCE PRIMARY READING   ANSWERED BY self   RELATIONSHIP SELF       Abuse Screening:  Abuse Screening Questionnaire 1/21/2020   Do you ever feel afraid of your partner? N   Are you in a relationship with someone who physically or mentally threatens you? N   Is it safe for you to go home? Y       Fall Risk  No flowsheet data found. Pt currently taking Anticoagulant therapy? no    Coordination of Care:  1. Have you been to the ER, urgent care clinic since your last visit? Hospitalized since your last visit? no    2. Have you seen or consulted any other health care providers outside of the [address] [place] [address] since your last visit? Include any pap smears or colon screening.

## 2020-03-30 RX ORDER — AMLODIPINE BESYLATE 10 MG/1
TABLET ORAL
Qty: 30 TAB | Refills: 0 | Status: SHIPPED | OUTPATIENT
Start: 2020-03-30

## 2020-04-06 RX ORDER — BENAZEPRIL HYDROCHLORIDE 40 MG/1
TABLET ORAL
Qty: 180 TAB | Refills: 3 | Status: SHIPPED | OUTPATIENT
Start: 2020-04-06 | End: 2021-04-18

## 2020-05-11 NOTE — TELEPHONE ENCOUNTER
Addended by: NATALI MCCLURE on: 5/11/2020 02:00 PM     Modules accepted: Orders     Patient was scheduled to see Silverio Whittaker at 12:30 today. Patient showed up at 12:50. After checking him in I realized how late it was and went back to check with Silverio Whittaker. Silverio Whittaker said he would need to reschedule. When I told the patient, he got upset and stated he would find another practice and walked out.

## 2020-06-22 RX ORDER — AMLODIPINE BESYLATE 10 MG/1
TABLET ORAL
Qty: 30 TAB | Refills: 0 | OUTPATIENT
Start: 2020-06-22

## 2021-04-18 RX ORDER — BENAZEPRIL HYDROCHLORIDE 40 MG/1
TABLET ORAL
Qty: 180 TAB | Refills: 2 | Status: SHIPPED | OUTPATIENT
Start: 2021-04-18

## 2022-01-30 ENCOUNTER — APPOINTMENT (OUTPATIENT)
Dept: CT IMAGING | Age: 67
End: 2022-01-30
Attending: STUDENT IN AN ORGANIZED HEALTH CARE EDUCATION/TRAINING PROGRAM
Payer: MEDICARE

## 2022-01-30 ENCOUNTER — HOSPITAL ENCOUNTER (EMERGENCY)
Age: 67
Discharge: HOME | End: 2022-01-31
Attending: STUDENT IN AN ORGANIZED HEALTH CARE EDUCATION/TRAINING PROGRAM
Payer: MEDICARE

## 2022-01-30 DIAGNOSIS — I82.220 IVC THROMBOSIS (HCC): Primary | ICD-10-CM

## 2022-01-30 DIAGNOSIS — C25.9 MALIGNANT NEOPLASM OF PANCREAS, UNSPECIFIED LOCATION OF MALIGNANCY (HCC): ICD-10-CM

## 2022-01-30 LAB
ALBUMIN SERPL-MCNC: 2.7 G/DL (ref 3.4–5)
ALBUMIN/GLOB SERPL: 0.5 {RATIO} (ref 0.8–1.7)
ALP SERPL-CCNC: 240 U/L (ref 45–117)
ALT SERPL-CCNC: 20 U/L (ref 16–61)
ANION GAP SERPL CALC-SCNC: 8 MMOL/L (ref 3–18)
AST SERPL-CCNC: 16 U/L (ref 10–38)
ATRIAL RATE: 110 BPM
BASOPHILS # BLD: 0 K/UL (ref 0–0.1)
BASOPHILS NFR BLD: 0 % (ref 0–2)
BILIRUB SERPL-MCNC: 0.6 MG/DL (ref 0.2–1)
BUN SERPL-MCNC: 24 MG/DL (ref 7–18)
BUN/CREAT SERPL: 27 (ref 12–20)
CALCIUM SERPL-MCNC: 9.9 MG/DL (ref 8.5–10.1)
CALCULATED P AXIS, ECG09: 81 DEGREES
CALCULATED R AXIS, ECG10: 45 DEGREES
CALCULATED T AXIS, ECG11: 57 DEGREES
CHLORIDE SERPL-SCNC: 100 MMOL/L (ref 100–111)
CO2 SERPL-SCNC: 27 MMOL/L (ref 21–32)
CREAT SERPL-MCNC: 0.9 MG/DL (ref 0.6–1.3)
DIAGNOSIS, 93000: NORMAL
DIFFERENTIAL METHOD BLD: ABNORMAL
EOSINOPHIL # BLD: 0 K/UL (ref 0–0.4)
EOSINOPHIL NFR BLD: 0 % (ref 0–5)
ERYTHROCYTE [DISTWIDTH] IN BLOOD BY AUTOMATED COUNT: 14.1 % (ref 11.6–14.5)
GLOBULIN SER CALC-MCNC: 5 G/DL (ref 2–4)
GLUCOSE SERPL-MCNC: 147 MG/DL (ref 74–99)
HCT VFR BLD AUTO: 21.9 % (ref 36–48)
HGB BLD-MCNC: 7.2 G/DL (ref 13–16)
IMM GRANULOCYTES # BLD AUTO: 0.1 K/UL (ref 0–0.04)
IMM GRANULOCYTES NFR BLD AUTO: 1 % (ref 0–0.5)
LIPASE SERPL-CCNC: 19 U/L (ref 73–393)
LYMPHOCYTES # BLD: 1.9 K/UL (ref 0.9–3.6)
LYMPHOCYTES NFR BLD: 11 % (ref 21–52)
MAGNESIUM SERPL-MCNC: 2.4 MG/DL (ref 1.6–2.6)
MCH RBC QN AUTO: 33.2 PG (ref 24–34)
MCHC RBC AUTO-ENTMCNC: 32.9 G/DL (ref 31–37)
MCV RBC AUTO: 100.9 FL (ref 78–100)
MONOCYTES # BLD: 2 K/UL (ref 0.05–1.2)
MONOCYTES NFR BLD: 12 % (ref 3–10)
NEUTS SEG # BLD: 13.2 K/UL (ref 1.8–8)
NEUTS SEG NFR BLD: 76 % (ref 40–73)
NRBC # BLD: 0 K/UL (ref 0–0.01)
NRBC BLD-RTO: 0 PER 100 WBC
P-R INTERVAL, ECG05: 146 MS
PLATELET # BLD AUTO: 438 K/UL (ref 135–420)
PMV BLD AUTO: 11.7 FL (ref 9.2–11.8)
POTASSIUM SERPL-SCNC: 4.7 MMOL/L (ref 3.5–5.5)
PROT SERPL-MCNC: 7.7 G/DL (ref 6.4–8.2)
Q-T INTERVAL, ECG07: 326 MS
QRS DURATION, ECG06: 84 MS
QTC CALCULATION (BEZET), ECG08: 441 MS
RBC # BLD AUTO: 2.17 M/UL (ref 4.35–5.65)
SODIUM SERPL-SCNC: 135 MMOL/L (ref 136–145)
VENTRICULAR RATE, ECG03: 110 BPM
WBC # BLD AUTO: 17.3 K/UL (ref 4.6–13.2)

## 2022-01-30 PROCEDURE — 93005 ELECTROCARDIOGRAM TRACING: CPT

## 2022-01-30 PROCEDURE — 80053 COMPREHEN METABOLIC PANEL: CPT

## 2022-01-30 PROCEDURE — 74177 CT ABD & PELVIS W/CONTRAST: CPT

## 2022-01-30 PROCEDURE — 85025 COMPLETE CBC W/AUTO DIFF WBC: CPT

## 2022-01-30 PROCEDURE — 96375 TX/PRO/DX INJ NEW DRUG ADDON: CPT

## 2022-01-30 PROCEDURE — 83735 ASSAY OF MAGNESIUM: CPT

## 2022-01-30 PROCEDURE — 74011000636 HC RX REV CODE- 636: Performed by: STUDENT IN AN ORGANIZED HEALTH CARE EDUCATION/TRAINING PROGRAM

## 2022-01-30 PROCEDURE — 99285 EMERGENCY DEPT VISIT HI MDM: CPT

## 2022-01-30 PROCEDURE — 74011000258 HC RX REV CODE- 258: Performed by: STUDENT IN AN ORGANIZED HEALTH CARE EDUCATION/TRAINING PROGRAM

## 2022-01-30 PROCEDURE — 96372 THER/PROPH/DIAG INJ SC/IM: CPT

## 2022-01-30 PROCEDURE — 74011250636 HC RX REV CODE- 250/636: Performed by: STUDENT IN AN ORGANIZED HEALTH CARE EDUCATION/TRAINING PROGRAM

## 2022-01-30 PROCEDURE — 96365 THER/PROPH/DIAG IV INF INIT: CPT

## 2022-01-30 PROCEDURE — 83690 ASSAY OF LIPASE: CPT

## 2022-01-30 PROCEDURE — 96376 TX/PRO/DX INJ SAME DRUG ADON: CPT

## 2022-01-30 RX ORDER — HYDROMORPHONE HYDROCHLORIDE 1 MG/ML
1 INJECTION, SOLUTION INTRAMUSCULAR; INTRAVENOUS; SUBCUTANEOUS
Status: COMPLETED | OUTPATIENT
Start: 2022-01-30 | End: 2022-01-30

## 2022-01-30 RX ORDER — HYDROMORPHONE HYDROCHLORIDE 1 MG/ML
1 INJECTION, SOLUTION INTRAMUSCULAR; INTRAVENOUS; SUBCUTANEOUS ONCE
Status: COMPLETED | OUTPATIENT
Start: 2022-01-30 | End: 2022-01-30

## 2022-01-30 RX ORDER — ENOXAPARIN SODIUM 100 MG/ML
1 INJECTION SUBCUTANEOUS EVERY 12 HOURS
Status: DISCONTINUED | OUTPATIENT
Start: 2022-01-30 | End: 2022-01-31 | Stop reason: HOSPADM

## 2022-01-30 RX ORDER — HYDROMORPHONE HYDROCHLORIDE 1 MG/ML
1 INJECTION, SOLUTION INTRAMUSCULAR; INTRAVENOUS; SUBCUTANEOUS
Status: DISCONTINUED | OUTPATIENT
Start: 2022-01-30 | End: 2022-01-31

## 2022-01-30 RX ADMIN — SODIUM CHLORIDE 1000 ML: 900 INJECTION, SOLUTION INTRAVENOUS at 16:30

## 2022-01-30 RX ADMIN — PROMETHAZINE HYDROCHLORIDE 25 MG: 25 INJECTION INTRAMUSCULAR; INTRAVENOUS at 14:53

## 2022-01-30 RX ADMIN — HYDROMORPHONE HYDROCHLORIDE 1 MG: 1 INJECTION, SOLUTION INTRAMUSCULAR; INTRAVENOUS; SUBCUTANEOUS at 18:11

## 2022-01-30 RX ADMIN — HYDROMORPHONE HYDROCHLORIDE 1 MG: 1 INJECTION, SOLUTION INTRAMUSCULAR; INTRAVENOUS; SUBCUTANEOUS at 14:53

## 2022-01-30 RX ADMIN — ENOXAPARIN SODIUM 100 MG: 100 INJECTION SUBCUTANEOUS at 20:48

## 2022-01-30 RX ADMIN — IOPAMIDOL 100 ML: 612 INJECTION, SOLUTION INTRAVENOUS at 17:40

## 2022-01-30 RX ADMIN — HYDROMORPHONE HYDROCHLORIDE 1 MG: 1 INJECTION, SOLUTION INTRAMUSCULAR; INTRAVENOUS; SUBCUTANEOUS at 20:48

## 2022-01-30 RX ADMIN — HYDROMORPHONE HYDROCHLORIDE 1 MG: 1 INJECTION, SOLUTION INTRAMUSCULAR; INTRAVENOUS; SUBCUTANEOUS at 23:21

## 2022-01-30 NOTE — ED TRIAGE NOTES
Pt arrived via EMS c/o bilateral flank pain. Pt has pancreatic cancer and is on palliative care. Pt states that he took morphine 30 mg at 0600 and Oxycodone at 0900 this morning with continued pain.

## 2022-01-30 NOTE — ED PROVIDER NOTES
Patient is a 42-year-old male with history of metastatic pancreatic cancer currently on palliative care, gastroesophageal reflux disease, and DINORAH. Patient visited with primary complaint of 5 days of severe, sharp, bilateral CVA tenderness that radiates down to his bilateral hips. Patient denies any previous episodes of pain similar to this and reports normal bowel movements, normal voiding, no hematuria, melena, hematochezia, nausea/vomiting, chest pain, or difficulty breathing. Past Medical History:   Diagnosis Date    Anxiety     Arthritis     between C4 and C5    Basal cell carcinoma     s/p resection    Carotid duplex 02/04/2013    No stenosis >49% bilaterally.  Carpal tunnel syndrome     Chronic LE edema 07/19/2011    dopplers negative 7/11    Diabetes ([facility] [address] 05/2016    fbs>125 x2    Dyslipidemia     Fatty liver on Us 1/02     Fib-4 was 0.43 from 9/14    GERD (gastroesophageal reflux disease)     History of myocardial perfusion scan 01/21/2013    Mild fixed basal inferior, mid inferior, inferoapical defect likely artifact, but inrafction in RCA not entirely excluded. No ischemia. EF 53%. No WMA. Neg EKG on max EST. Ex time 8 min 15 sec.  Hypertension     Hypovitaminosis D     Low serum testosterone level 2012    negative w/u Dr. Catherine Riddle Morbid obesity ([facility] [address]     peak weight 286 lbs, bmi 38.8 from 7/11; intol qsymia, wellbutrin    Normal stress echo 03/31/2015    Normal study after maximal exercise. No symptoms. EF 60%. Ex time 9 min.  Prediabetes     Rhinophyma     S/P cardiac cath 01/22/2002    Patent coronary arteries. LVEDP 12. EF 60%.  Sinus bradycardia     Asymptomatic in 2011    Sleep apnea mild Dr. Makayla Mendoza 2011     AHI 2.3, minimum desats 83% 2011 Dr Alejandra Moses Vasovagal syncope 02/2018    VSD (ventricular septal defect) 2011    Noted on echo, with flow noted between the aorta near the aortic valve and the RV outflow tract/proximal main PA. This can be consistent with a small outlet VSD    Zoster     3 prior episodes in right CN5 distribution? suspect hsv instead       Past Surgical History:   Procedure Laterality Date    CARDIAC SURG PROCEDURE UNLIST  2018    echo nl lv, ef 60%, no wma, no valvular disease, no change     HX COLONOSCOPY      Dr. Shad Prescott  negative; 17 divertics    HX HEART CATHETERIZATION  02    The right coronary artery is large and dominant. It is widely patent. The left main coronary artery is widely patent. The left anterior descending coronary artery is widely patent. The circumflex coronary artery is widely patent. LVEDP is 12 mmHg. Overall left ventricular systolic function is within normal limits with an EF of 60%. No significant MR or AS is noted. [address]    surgery for deviated septum    HX ORTHOPAEDIC Right     knee cartilage removed right knee    HX ORTHOPAEDIC Left     CTR    HX ORTHOPAEDIC      Bilateral hip replacement Dr Gonzalo Barton Right     trigger release 4th finger hand    HX VASECTOMY      VASCULAR SURGERY PROCEDURE UNLIST      renal artery dopplers neg         Family History:   Problem Relation Age of Onset    Diabetes Mother     Hypertension Father     Cancer Father         bladder       Social History     Socioeconomic History    Marital status: UNKNOWN     Spouse name: Not on file    Number of children: 1    Years of education: Not on file    Highest education level: Not on file   Occupational History    Occupation: developer   Tobacco Use    Smoking status: Former Smoker     Packs/day: 1.00     Years: 10.00     Pack years: 10.00     Quit date: 1987     Years since quittin.2    Smokeless tobacco: Never Used   Substance and Sexual Activity    Alcohol use:  Yes     Alcohol/week: 1.7 standard drinks     Types: 2 Standard drinks or equivalent per week     Comment: social weekly    Drug use: No    Sexual activity: Yes   Other Topics Concern    Not on file   Social History Narrative    Not on file     Social Determinants of Health     Financial Resource Strain:     Difficulty of Paying Living Expenses: Not on file   Food Insecurity:     Worried About Running Out of Food in the Last Year: Not on file    Mary of Food in the Last Year: Not on file   Transportation Needs:     Lack of Transportation (Medical): Not on file    Lack of Transportation (Non-Medical): Not on file   Physical Activity:     Days of Exercise per Week: Not on file    Minutes of Exercise per Session: Not on file   Stress:     Feeling of Stress : Not on file   Social Connections:     Frequency of Communication with Friends and Family: Not on file    Frequency of Social Gatherings with Friends and Family: Not on file    Attends Restorationism Services: Not on file    Active Member of [address] [place] [address] or Organizations: Not on file    Attends Club or Organization Meetings: Not on file    Marital Status: Not on file   Intimate Partner Violence:     Fear of Current or Ex-Partner: Not on file    Emotionally Abused: Not on file    Physically Abused: Not on file    Sexually Abused: Not on file   Housing Stability:     Unable to Pay for Housing in the Last Year: Not on file    Number of [place] in the Last Year: Not on file    Unstable Housing in the Last Year: Not on file         ALLERGIES: Qsymia [phentermine-topiramate] and Wellbutrin [bupropion hcl]    Review of Systems   Constitutional: Negative for chills and fever. HENT: Negative for rhinorrhea and sore throat. Eyes: Negative for discharge and redness. Respiratory: Negative for cough and shortness of breath. Cardiovascular: Negative for chest pain and leg swelling. Gastrointestinal: Negative for abdominal pain, diarrhea, nausea and vomiting. Genitourinary: Positive for flank pain. Negative for decreased urine volume, difficulty urinating, dysuria and hematuria.    Musculoskeletal: Negative for back pain and neck pain. Skin: Negative for rash and wound. Neurological: Negative for syncope, light-headedness and headaches. Vitals:    01/30/22 1254   BP: (!) 154/105   Pulse: (!) 118   Resp: 16   Temp: 98.2 °F (36.8 °C)   SpO2: 99%   Weight: 97.5 kg (215 lb)   Height: 6' (1.829 m)            Physical Exam  Constitutional:       General: He is not in acute distress. Appearance: He is not ill-appearing, toxic-appearing or diaphoretic. HENT:      Head: Normocephalic and atraumatic. Right Ear: External ear normal.      Left Ear: External ear normal.      Nose: No congestion or rhinorrhea. Mouth/Throat:      Mouth: Mucous membranes are moist.      Pharynx: No oropharyngeal exudate or posterior oropharyngeal erythema. Eyes:      General:         Right eye: No discharge. Left eye: No discharge. Pupils: Pupils are equal, round, and reactive to light. Neck:      Vascular: No carotid bruit. Cardiovascular:      Rate and Rhythm: Normal rate and regular rhythm. Heart sounds: No murmur heard. No friction rub. No gallop. Pulmonary:      Effort: Pulmonary effort is normal. No respiratory distress. Breath sounds: No stridor. No wheezing, rhonchi or rales. Abdominal:      General: Abdomen is flat. There is no distension. Tenderness: There is no right CVA tenderness, left CVA tenderness, guarding or rebound. Comments: No appreciable CVA tenderness bilaterally, abdomen is soft, nontender, nondistended with no rebound or guarding. Musculoskeletal:         General: No swelling, tenderness, deformity or signs of injury. Cervical back: No rigidity or tenderness. Right lower leg: No edema. Left lower leg: No edema. Lymphadenopathy:      Cervical: No cervical adenopathy. Skin:     General: Skin is warm. Capillary Refill: Capillary refill takes less than 2 seconds. Coloration: Skin is not jaundiced or pale.       Findings: No bruising, erythema, lesion or rash. Neurological:      General: No focal deficit present. Mental Status: He is alert and oriented to person, place, and time. Sensory: No sensory deficit. Motor: No weakness. Psychiatric:         Mood and Affect: Mood normal.          MDM  Number of Diagnoses or Management Options  Diagnosis management comments: Patient has a primary complaint of bilateral flank pain, given patient's history of known metastatic pancreatic disease a significant concern would be for an obstructive uropathy secondary to metastasis other considerations to be worsening of pancreatic cancer. Will obtain imaging as if he demonstrates evidence of an obstruction he may be appropriate for palliative drain placement. We will also treat patient's pain, check for significant electrolyte abnormalities patient reports decreased p.o. recently and provide IV hydration. Disposition pending imaging results.        Amount and/or Complexity of Data Reviewed  Tests in the medicine section of CPT®: reviewed           Procedures

## 2022-01-31 VITALS
SYSTOLIC BLOOD PRESSURE: 137 MMHG | OXYGEN SATURATION: 99 % | RESPIRATION RATE: 18 BRPM | TEMPERATURE: 98.4 F | BODY MASS INDEX: 29.12 KG/M2 | WEIGHT: 215 LBS | DIASTOLIC BLOOD PRESSURE: 59 MMHG | HEIGHT: 72 IN | HEART RATE: 78 BPM

## 2022-01-31 LAB
APPEARANCE UR: ABNORMAL
BACTERIA URNS QL MICRO: ABNORMAL /HPF
BILIRUB UR QL: NEGATIVE
COLOR UR: YELLOW
EPITH CASTS URNS QL MICRO: ABNORMAL /LPF (ref 0–5)
GLUCOSE UR STRIP.AUTO-MCNC: NEGATIVE MG/DL
HGB UR QL STRIP: ABNORMAL
HYALINE CASTS URNS QL MICRO: ABNORMAL /LPF (ref 0–2)
KETONES UR QL STRIP.AUTO: ABNORMAL MG/DL
LEUKOCYTE ESTERASE UR QL STRIP.AUTO: NEGATIVE
NITRITE UR QL STRIP.AUTO: NEGATIVE
PH UR STRIP: 5.5 [PH] (ref 5–8)
PROT UR STRIP-MCNC: 100 MG/DL
RBC #/AREA URNS HPF: ABNORMAL /HPF (ref 0–5)
SP GR UR REFRACTOMETRY: >1.03 (ref 1–1.03)
UROBILINOGEN UR QL STRIP.AUTO: 0.2 EU/DL (ref 0.2–1)
WBC URNS QL MICRO: ABNORMAL /HPF (ref 0–5)

## 2022-01-31 PROCEDURE — 81001 URINALYSIS AUTO W/SCOPE: CPT

## 2022-01-31 PROCEDURE — 74011000258 HC RX REV CODE- 258: Performed by: STUDENT IN AN ORGANIZED HEALTH CARE EDUCATION/TRAINING PROGRAM

## 2022-01-31 PROCEDURE — 74011250636 HC RX REV CODE- 250/636: Performed by: STUDENT IN AN ORGANIZED HEALTH CARE EDUCATION/TRAINING PROGRAM

## 2022-01-31 PROCEDURE — 74011250637 HC RX REV CODE- 250/637: Performed by: STUDENT IN AN ORGANIZED HEALTH CARE EDUCATION/TRAINING PROGRAM

## 2022-01-31 RX ORDER — MORPHINE SULFATE 2 MG/ML
2 INJECTION, SOLUTION INTRAMUSCULAR; INTRAVENOUS
Status: DISCONTINUED | OUTPATIENT
Start: 2022-01-31 | End: 2022-01-31

## 2022-01-31 RX ORDER — FENTANYL 75 UG/H
1 PATCH TRANSDERMAL
Status: DISCONTINUED | OUTPATIENT
Start: 2022-01-31 | End: 2022-01-31 | Stop reason: HOSPADM

## 2022-01-31 RX ORDER — HYDROMORPHONE HYDROCHLORIDE 2 MG/1
2 TABLET ORAL
Status: DISCONTINUED | OUTPATIENT
Start: 2022-01-31 | End: 2022-01-31

## 2022-01-31 RX ORDER — HYDROMORPHONE HYDROCHLORIDE 2 MG/1
2 TABLET ORAL
Status: COMPLETED | OUTPATIENT
Start: 2022-01-31 | End: 2022-01-31

## 2022-01-31 RX ORDER — LISINOPRIL 20 MG/1
40 TABLET ORAL ONCE
Status: COMPLETED | OUTPATIENT
Start: 2022-01-31 | End: 2022-01-31

## 2022-01-31 RX ORDER — AMLODIPINE BESYLATE 10 MG/1
10 TABLET ORAL DAILY
Status: DISCONTINUED | OUTPATIENT
Start: 2022-01-31 | End: 2022-01-31 | Stop reason: HOSPADM

## 2022-01-31 RX ORDER — HYDROMORPHONE HYDROCHLORIDE 1 MG/ML
1 INJECTION, SOLUTION INTRAMUSCULAR; INTRAVENOUS; SUBCUTANEOUS
Status: DISCONTINUED | OUTPATIENT
Start: 2022-01-31 | End: 2022-01-31

## 2022-01-31 RX ORDER — PANTOPRAZOLE SODIUM 20 MG/1
20 TABLET, DELAYED RELEASE ORAL ONCE
Status: COMPLETED | OUTPATIENT
Start: 2022-01-31 | End: 2022-01-31

## 2022-01-31 RX ADMIN — ENOXAPARIN SODIUM 100 MG: 100 INJECTION SUBCUTANEOUS at 09:46

## 2022-01-31 RX ADMIN — HYDROMORPHONE HYDROCHLORIDE 1 MG: 1 INJECTION, SOLUTION INTRAMUSCULAR; INTRAVENOUS; SUBCUTANEOUS at 12:53

## 2022-01-31 RX ADMIN — PANTOPRAZOLE SODIUM 20 MG: 20 TABLET, DELAYED RELEASE ORAL at 09:49

## 2022-01-31 RX ADMIN — HYDROMORPHONE HYDROCHLORIDE 1 MG: 1 INJECTION, SOLUTION INTRAMUSCULAR; INTRAVENOUS; SUBCUTANEOUS at 10:57

## 2022-01-31 RX ADMIN — HYDROMORPHONE HYDROCHLORIDE 1 MG: 1 INJECTION, SOLUTION INTRAMUSCULAR; INTRAVENOUS; SUBCUTANEOUS at 06:50

## 2022-01-31 RX ADMIN — HYDROMORPHONE HYDROCHLORIDE 1 MG: 1 INJECTION, SOLUTION INTRAMUSCULAR; INTRAVENOUS; SUBCUTANEOUS at 03:14

## 2022-01-31 RX ADMIN — HYDROMORPHONE HYDROCHLORIDE 2 MG: 2 TABLET ORAL at 15:29

## 2022-01-31 RX ADMIN — HYDROMORPHONE HYDROCHLORIDE 2 MG: 2 TABLET ORAL at 18:30

## 2022-01-31 RX ADMIN — PROMETHAZINE HYDROCHLORIDE 25 MG: 25 INJECTION INTRAMUSCULAR; INTRAVENOUS at 06:54

## 2022-01-31 RX ADMIN — LISINOPRIL 40 MG: 20 TABLET ORAL at 09:46

## 2022-01-31 RX ADMIN — AMLODIPINE BESYLATE 10 MG: 10 TABLET ORAL at 09:46

## 2022-01-31 NOTE — ED NOTES
Purposeful rounding completed:    Side rails up x 1:  YES  Bed in low position and wheels locked: YES  Call bell within reach: YES  Comfort addressed: YES    Toileting needs addressed: YES  Plan of care reviewed/updated with patient and or family members: YES  IV site assessed: YES  Pain assessed and addressed: YES, 0    Patient laying in semi melara position with eyes closed laying on right side. Covers over torso.

## 2022-01-31 NOTE — ED NOTES
Purposeful rounding completed:    Side rails up x 1:  YES  Bed in low position and wheels locked: YES  Call bell within reach: YES  Comfort addressed: YES    Toileting needs addressed: YES  Plan of care reviewed/updated with patient and or family members: YES  IV site assessed: YES  Pain assessed and addressed: YES, 0    Patient laying on right side, Covers over torso.

## 2022-01-31 NOTE — ED NOTES
Patient care assumed from Dr. Montse Juarez. Patient awaiting palliative care team to arrange for home hospice this morning. Patient pending UA prior to discharge.

## 2022-01-31 NOTE — ED NOTES
Purposeful rounding completed:    Side rails up x 1:  YES  Bed in low position and wheels locked: YES  Call bell within reach: YES  Comfort addressed: YES    Toileting needs addressed: YES  Plan of care reviewed/updated with patient and or family members: YES  IV site assessed: YES  Pain assessed and addressed: YES, 6    Laying in bed watching TV. No respiratory distress observed, calm, cooperative.

## 2022-01-31 NOTE — ED NOTES
Spoke with  and he is aware patient is in pain 10/10. Requested additional pain medication that can be given every 2 hours.

## 2022-01-31 NOTE — ED NOTES
Purposeful rounding completed:    Side rails up x 1:  YES  Bed in low position and wheels locked: YES  Call bell within reach: YES  Comfort addressed: YES    Toileting needs addressed: YES  Plan of care reviewed/updated with patient and or family members: YES  IV site assessed: YES  Pain assessed and addressed: YES, 8    Sitting on side of bed looking at his phone. Alert and oriented, calm, cooperative.

## 2022-01-31 NOTE — DIABETES MGMT
Diabetes/ Glycemic Control Plan of Care    Rcommendations: No recommendations. Plan for palliative care team and arrange home hospice care. 1/31: Patient in ED and noted plan for palliative care team and arrangement for home hospice care. Assessment:   DX:   1. IVC thrombosis (HCC)        Noted: Metastatic pancreatic cancer    Fasting/ Morning blood glucose:   Lab Results   Component Value Date/Time    Glucose 147 (H) 01/30/2022 02:39 PM    Glucose (POC) 137 (H) 02/27/2018 11:22 AM     IV Fluids containing dextrose:  None    Steroids:   None    Blood glucose values:  Lab BG 1/30/2022 147 at 14:39. No POC BG order and pending palliative care team; plan to arrange home hospice care. Within target range (70-180mg/dL):  Lab BG 1/30/2022 147 at 14:39    Current insulin orders:   None    Total Daily Dose previous 24 hours: None    Current A1c:   Lab Results   Component Value Date/Time    Hemoglobin A1c 6.0 (H) 02/25/2018 05:00 AM      equivalent  to ave Blood Glucose of (result dated 2/05/2022) mg/dl for 2-3 months prior to admission    Adequate glycemic control PTA: Last A1c report of 6.0 (2/25/2022). Noted pending palliative care team; plan to arrange home hospice care. Nutrition/Diet:   Active Orders   Diet    ADULT DIET Regular      Meal Intake:  No data found. Supplement Intake:  No data found.     Home diabetes medications:   Key Antihyperglycemic Medications             metFORMIN ER (GLUCOPHAGE XR) 500 mg tablet TAKE ONE TABLET BY MOUTH DAILY WITH DINNER          Plan/Goals:   Blood glucose will be within target of 70 - 180 mg/dl within 72 hours     Education:  [] Refer to Diabetes Education Record                       [x] Education not indicated at this time     Berenice Ruano RN [facility]  Pager: [phone]

## 2022-01-31 NOTE — ED NOTES
Assumed care for the patient from Dr. Pina Daniels at shift change. Patient is waiting for his palliative team to arrange for home hospice and pain medication. 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 I had noticed that the patient had a UA ordered but it was not performed and he had a leukocytosis of 17. I informed the nurse that we will need to obtain a urine test prior to his discharge. Will sign out to Dr. Bev Ribeiro pending UA and follow-up with palliative regarding discharge plan.     Ruma Lantigua MD  6:04 AM

## 2022-01-31 NOTE — ED NOTES
Purposeful rounding completed:    Side rails up x 1:  YES  Bed in low position and wheels locked: YES  Call bell within reach: YES  Comfort addressed: YES    Toileting needs addressed: YES  Plan of care reviewed/updated with patient and or family members: YES  IV site assessed: YES  Pain assessed and addressed: YES, 8    Laying in bed watching TV. No respiratory distress observed, calm, cooperative.

## 2022-01-31 NOTE — PROGRESS NOTES
Spoke with Maricarmen with [facility]. Maricarmen requested all notes and medication list to be uploaded in [facility]. Updated Dr. Dionte Esquivel via phone.      MAYRA Young, RN  Pager # [phone]  Care Manager

## 2022-01-31 NOTE — ED NOTES
Purposeful rounding completed:    Side rails up x 1:  YES  Bed in low position and wheels locked: YES  Call bell within reach: YES  Comfort addressed: YES    Toileting needs addressed: YES  Plan of care reviewed/updated with patient and or family members: YES  IV site assessed: YES  Pain assessed and addressed: YES, 9    Laying in bed watching TV. No respiratory distress observed, calm, cooperative.

## 2022-01-31 NOTE — ED NOTES
Assumed care of patient. Laying in semi melara position with blankets covering torso. Eyes closed, cardiac, SpO2 and blood pressure monitored. 20G IV flushed, saline locked. No respiratory distress observed. Skin is warm and dry to touch. Will continue to monitor.

## 2023-10-22 NOTE — ED NOTES
Purposeful rounding completed:    Side rails up x 1:  YES  Bed in low position and wheels locked: YES  Call bell within reach: YES  Comfort addressed: YES    Toileting needs addressed: YES  Plan of care reviewed/updated with patient and or family members: YES  IV site assessed: YES  Pain assessed and addressed: YES, 6    Laying in bed watching TV. No respiratory distress observed, calm, cooperative. sent home w/ family/friend

## 2024-04-18 NOTE — TELEPHONE ENCOUNTER
----- Message from Leola Ya MD sent at 9/25/2017 11:03 PM EDT -----  Records [facility] I have personally performed a history and physical exam on this patient and personally directed the management of the patient.
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
